# Patient Record
Sex: MALE | Race: BLACK OR AFRICAN AMERICAN | Employment: UNEMPLOYED | URBAN - METROPOLITAN AREA
[De-identification: names, ages, dates, MRNs, and addresses within clinical notes are randomized per-mention and may not be internally consistent; named-entity substitution may affect disease eponyms.]

---

## 2019-02-16 ENCOUNTER — ED HISTORICAL/CONVERTED ENCOUNTER (OUTPATIENT)
Dept: OTHER | Age: 34
End: 2019-02-16

## 2020-08-19 ENCOUNTER — IP HISTORICAL/CONVERTED ENCOUNTER (OUTPATIENT)
Dept: OTHER | Age: 35
End: 2020-08-19

## 2020-10-28 ENCOUNTER — HOSPITAL ENCOUNTER (EMERGENCY)
Age: 35
Discharge: PSYCHIATRIC OR SUBSTANCE USE FACILITY | End: 2020-10-28
Attending: EMERGENCY MEDICINE
Payer: MEDICARE

## 2020-10-28 ENCOUNTER — HOSPITAL ENCOUNTER (INPATIENT)
Age: 35
LOS: 9 days | Discharge: HOME | DRG: 885 | End: 2020-11-06
Attending: PSYCHIATRY & NEUROLOGY | Admitting: PSYCHIATRY & NEUROLOGY
Payer: MEDICARE

## 2020-10-28 VITALS
BODY MASS INDEX: 24.52 KG/M2 | HEIGHT: 73 IN | TEMPERATURE: 97.7 F | HEART RATE: 92 BPM | OXYGEN SATURATION: 100 % | RESPIRATION RATE: 16 BRPM | WEIGHT: 185 LBS | SYSTOLIC BLOOD PRESSURE: 127 MMHG | DIASTOLIC BLOOD PRESSURE: 95 MMHG

## 2020-10-28 DIAGNOSIS — F20.9 SCHIZOPHRENIA, UNSPECIFIED TYPE (HCC): Primary | ICD-10-CM

## 2020-10-28 LAB
ALBUMIN SERPL-MCNC: 4.4 G/DL (ref 3.5–5)
ALBUMIN/GLOB SERPL: 1.3 {RATIO} (ref 1.1–2.2)
ALP SERPL-CCNC: 85 U/L (ref 45–117)
ALT SERPL-CCNC: 59 U/L (ref 12–78)
AMPHET UR QL SCN: NEGATIVE
ANION GAP SERPL CALC-SCNC: 5 MMOL/L (ref 5–15)
APPEARANCE UR: ABNORMAL
AST SERPL-CCNC: 49 U/L (ref 15–37)
BACTERIA URNS QL MICRO: NEGATIVE /HPF
BARBITURATES UR QL SCN: NEGATIVE
BASOPHILS # BLD: 0.1 K/UL (ref 0–0.1)
BASOPHILS NFR BLD: 1 % (ref 0–1)
BENZODIAZ UR QL: NEGATIVE
BILIRUB SERPL-MCNC: 0.6 MG/DL (ref 0.2–1)
BILIRUB UR QL: NEGATIVE
BUN SERPL-MCNC: 12 MG/DL (ref 6–20)
BUN/CREAT SERPL: 9 (ref 12–20)
CALCIUM SERPL-MCNC: 9.3 MG/DL (ref 8.5–10.1)
CANNABINOIDS UR QL SCN: NEGATIVE
CHLORIDE SERPL-SCNC: 101 MMOL/L (ref 97–108)
CO2 SERPL-SCNC: 28 MMOL/L (ref 21–32)
COCAINE UR QL SCN: NEGATIVE
COLOR UR: ABNORMAL
COVID-19 RAPID TEST, COVR: NOT DETECTED
CREAT SERPL-MCNC: 1.3 MG/DL (ref 0.7–1.3)
DIFFERENTIAL METHOD BLD: ABNORMAL
DRUG SCRN COMMENT,DRGCM: NORMAL
EOSINOPHIL # BLD: 0 K/UL (ref 0–0.4)
EOSINOPHIL NFR BLD: 0 % (ref 0–7)
EPITH CASTS URNS QL MICRO: ABNORMAL /LPF
ERYTHROCYTE [DISTWIDTH] IN BLOOD BY AUTOMATED COUNT: 12.6 % (ref 11.5–14.5)
ETHANOL SERPL-MCNC: <10 MG/DL
GLOBULIN SER CALC-MCNC: 3.5 G/DL (ref 2–4)
GLUCOSE SERPL-MCNC: 95 MG/DL (ref 65–100)
GLUCOSE UR STRIP.AUTO-MCNC: NEGATIVE MG/DL
HCT VFR BLD AUTO: 47 % (ref 36.6–50.3)
HEALTH STATUS, XMCV2T: NORMAL
HGB BLD-MCNC: 15.9 G/DL (ref 12.1–17)
HGB UR QL STRIP: NEGATIVE
IMM GRANULOCYTES # BLD AUTO: 0.1 K/UL (ref 0–0.04)
IMM GRANULOCYTES NFR BLD AUTO: 1 % (ref 0–0.5)
KETONES UR QL STRIP.AUTO: ABNORMAL MG/DL
LEUKOCYTE ESTERASE UR QL STRIP.AUTO: NEGATIVE
LYMPHOCYTES # BLD: 2.7 K/UL (ref 0.8–3.5)
LYMPHOCYTES NFR BLD: 22 % (ref 12–49)
MCH RBC QN AUTO: 31.1 PG (ref 26–34)
MCHC RBC AUTO-ENTMCNC: 33.8 G/DL (ref 30–36.5)
MCV RBC AUTO: 91.8 FL (ref 80–99)
METHADONE UR QL: NEGATIVE
MONOCYTES # BLD: 0.8 K/UL (ref 0–1)
MONOCYTES NFR BLD: 7 % (ref 5–13)
NEUTS SEG # BLD: 8.4 K/UL (ref 1.8–8)
NEUTS SEG NFR BLD: 69 % (ref 32–75)
NITRITE UR QL STRIP.AUTO: NEGATIVE
NRBC # BLD: 0 K/UL (ref 0–0.01)
NRBC BLD-RTO: 0 PER 100 WBC
OPIATES UR QL: NEGATIVE
PCP UR QL: NEGATIVE
PH UR STRIP: 5 [PH] (ref 5–8)
PLATELET # BLD AUTO: 299 K/UL (ref 150–400)
PMV BLD AUTO: 10 FL (ref 8.9–12.9)
POTASSIUM SERPL-SCNC: 4 MMOL/L (ref 3.5–5.1)
PROT SERPL-MCNC: 7.9 G/DL (ref 6.4–8.2)
PROT UR STRIP-MCNC: NEGATIVE MG/DL
RBC # BLD AUTO: 5.12 M/UL (ref 4.1–5.7)
RBC #/AREA URNS HPF: ABNORMAL /HPF (ref 0–5)
SODIUM SERPL-SCNC: 134 MMOL/L (ref 136–145)
SOURCE, COVRS: NORMAL
SP GR UR REFRACTOMETRY: 1.02 (ref 1–1.03)
SPECIMEN SOURCE, FCOV2M: NORMAL
SPECIMEN TYPE, XMCV1T: NORMAL
UA: UC IF INDICATED,UAUC: ABNORMAL
UROBILINOGEN UR QL STRIP.AUTO: 0.2 EU/DL (ref 0.2–1)
WBC # BLD AUTO: 12.1 K/UL (ref 4.1–11.1)
WBC URNS QL MICRO: ABNORMAL /HPF (ref 0–4)

## 2020-10-28 PROCEDURE — 87635 SARS-COV-2 COVID-19 AMP PRB: CPT

## 2020-10-28 PROCEDURE — 80307 DRUG TEST PRSMV CHEM ANLYZR: CPT

## 2020-10-28 PROCEDURE — 36415 COLL VENOUS BLD VENIPUNCTURE: CPT

## 2020-10-28 PROCEDURE — 80053 COMPREHEN METABOLIC PANEL: CPT

## 2020-10-28 PROCEDURE — 99284 EMERGENCY DEPT VISIT MOD MDM: CPT

## 2020-10-28 PROCEDURE — 65220000003 HC RM SEMIPRIVATE PSYCH

## 2020-10-28 PROCEDURE — 81001 URINALYSIS AUTO W/SCOPE: CPT

## 2020-10-28 PROCEDURE — 85025 COMPLETE CBC W/AUTO DIFF WBC: CPT

## 2020-10-28 NOTE — ED NOTES
verbal} shift change report given to Loulou Christian RN (oncoming nurse) by Val Gonsales RN (offgoing nurse). Report included the following information SBAR, Kardex, ED Summary, Procedure Summary, Intake/Output and MAR.

## 2020-10-28 NOTE — ED NOTES
Bedside and Verbal shift change report given to Turner Romero   (oncoming nurse) by Barry Bledsoe RN (offgoing nurse). Report included the following information SBAR, Kardex, ED Summary, [facility] and Recent Results.

## 2020-10-28 NOTE — ED NOTES
Pt belongings in black cabinet in belongings bag and labeled, pt only has one bag of belongings. Pt has cell phone, wallet with 1 dollar in it per [employer] officer and shoes, shirt and pants.

## 2020-10-28 NOTE — ED NOTES
Spoke with Anjali Suarez from [address] [place] [address] and per reports it seems that the family member that took out the ECO on behalf of his grandmother is not available. Anjali Suarez stated she was going to try and get in contact with the grandmother to confirm why ECO was taken out and would get back with this writer to see if patient meets criteria for TDO.

## 2020-10-28 NOTE — ED NOTES
Pt stated he would allow for labs to be drawn. This writer inserted IV and this writer was able to obtain a pst when patient stated he felt light headed. Pt asked to remove IV. This writer removed IV. Pt was laid down in bed. Pt then sat back up in bed and was provided water which he tolerated. This writer advised we would try again for labs in a few minutes.
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
## 2020-10-29 PROBLEM — F20.9 SCHIZOPHRENIA (HCC): Status: ACTIVE | Noted: 2020-10-29

## 2020-10-29 LAB
SARS-COV-2, COV2: NOT DETECTED
SPECIMEN SOURCE, FCOV2M: NORMAL

## 2020-10-29 PROCEDURE — 65220000003 HC RM SEMIPRIVATE PSYCH

## 2020-10-29 PROCEDURE — 74011250637 HC RX REV CODE- 250/637: Performed by: PSYCHIATRY & NEUROLOGY

## 2020-10-29 RX ORDER — HALOPERIDOL 5 MG/ML
5 INJECTION INTRAMUSCULAR
Status: DISCONTINUED | OUTPATIENT
Start: 2020-10-29 | End: 2020-11-06 | Stop reason: HOSPADM

## 2020-10-29 RX ORDER — ADHESIVE BANDAGE
30 BANDAGE TOPICAL DAILY PRN
Status: DISCONTINUED | OUTPATIENT
Start: 2020-10-29 | End: 2020-11-06 | Stop reason: HOSPADM

## 2020-10-29 RX ORDER — BENZTROPINE MESYLATE 1 MG/1
1 TABLET ORAL
Status: DISCONTINUED | OUTPATIENT
Start: 2020-10-29 | End: 2020-11-06 | Stop reason: HOSPADM

## 2020-10-29 RX ORDER — OLANZAPINE 5 MG/1
5 TABLET ORAL
Status: DISCONTINUED | OUTPATIENT
Start: 2020-10-29 | End: 2020-11-06 | Stop reason: HOSPADM

## 2020-10-29 RX ORDER — ACETAMINOPHEN 325 MG/1
650 TABLET ORAL
Status: DISCONTINUED | OUTPATIENT
Start: 2020-10-29 | End: 2020-11-06 | Stop reason: HOSPADM

## 2020-10-29 RX ORDER — HYDROXYZINE 50 MG/1
50 TABLET, FILM COATED ORAL
Status: DISCONTINUED | OUTPATIENT
Start: 2020-10-29 | End: 2020-11-06 | Stop reason: HOSPADM

## 2020-10-29 RX ORDER — TRAZODONE HYDROCHLORIDE 50 MG/1
50 TABLET ORAL
Status: DISCONTINUED | OUTPATIENT
Start: 2020-10-29 | End: 2020-11-06 | Stop reason: HOSPADM

## 2020-10-29 RX ORDER — DIPHENHYDRAMINE HYDROCHLORIDE 50 MG/ML
50 INJECTION, SOLUTION INTRAMUSCULAR; INTRAVENOUS
Status: DISCONTINUED | OUTPATIENT
Start: 2020-10-29 | End: 2020-11-06 | Stop reason: HOSPADM

## 2020-10-29 RX ORDER — LORAZEPAM 2 MG/ML
1 INJECTION INTRAMUSCULAR
Status: DISCONTINUED | OUTPATIENT
Start: 2020-10-29 | End: 2020-11-06 | Stop reason: HOSPADM

## 2020-10-29 NOTE — PROGRESS NOTES
Problem: Altered Thought Process (Adult/Pediatric)  Goal: *STG: Seeks staff when feelings of anxiety and fear arise  Outcome: Progressing Towards Goal  Goal: *STG: Complies with medication therapy  Outcome: Progressing Towards Goal     Problem: Patient Education: Go to Patient Education Activity  Goal: Patient/Family Education  Outcome: Progressing Towards Goal

## 2020-10-29 NOTE — BH NOTES
TRANSFER - IN REPORT:    Verbal report received from Fernando Beckford 20 RN(name) on Aamir Lang  being received from [address] [place] [address] ER(unit) for routine progression of care      Report consisted of patients Situation, Background, Assessment and   Recommendations(SBAR). Information from the following report(s) Kardex was reviewed with the receiving nurse. Opportunity for questions and clarification was provided. Assessment completed upon patients arrival to unit and care assumed.      Calm and cooperative during stay in ER
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
## 2020-10-29 NOTE — PROGRESS NOTES
[facility] 60  Master Treatment Plan for Morgan Solomon    Date Treatment Plan Initiated: 10/29/2020    Treatment Plan Modalities:  Type of Modality Amount  (x minutes) Frequency (x/week) Duration (x days) Name of Responsible Staff   Community & wrap-up meetings to encourage peer interactions 15 7 1 DEEP [place]     Group psychotherapy to assist in building coping skills and internal controls 60 7 1 Arlette Cardoso   Therapeutic activity groups to build coping skills 60 7 1 Arlette Cardoso   Psychoeducation in group setting to address:   Medication education   15 7 [address] PharmD   Coping skills   30 3 1 Arlette Cardoso   Relaxation techniques         Symptom management         Discharge planning   60 2 [facility]    60 2 1 Chaplain DIA   61 1 1 volunteer   Recovery/AA/NA      volunteer   Physician medication management   15 7 1 Dr. Zoila Quezada, NP   Family meeting/discharge planning   15 2 1 Claritza Chaves and Anat Almazan                                      Problem: Altered Thought Process (Adult/Pediatric)  Goal: *STG: Participates in treatment plan  Outcome: Progressing Towards Goal  Goal: *STG: Decreased delusional thinking  Outcome: Progressing Towards Goal  Goal: *STG: Demonstrates ability to understand and use improved judgment in daily activities and relationships  Outcome: Progressing Towards Goal       Pt received up on unit. Engaged on the unit with nursing students leading a group with peers and staff. Calm, cooperative.

## 2020-10-29 NOTE — PROGRESS NOTES
Dex participated in [address] [place] [address] on 10/29/2020.     Rev.  Liliana Bill MDiv, [facility], [address] [facility] service: [facility] ([id number]
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
## 2020-10-29 NOTE — INTERDISCIPLINARY ROUNDS
Behavioral Health Interdisciplinary Rounds Patient Name: Tanner Goode  Age: 38 y.o. Room/Bed:  8/ Primary Diagnosis: <principal problem not specified> Admission Status: TDO Readmission within 30 days: no 
Power of  in place: no 
Patient requires a blocked bed: yes          Reason for blocked bed: behavior VTE Prophylaxis: No 
 
Mobility needs/Fall risk: no 
Flu Vaccine : no  
Nutritional Plan: no 
Consults:         
Labs/Testing due today?: yes Sleep hours:6 Participation in Care/Groups:  New admission Medication Compliant?: new admission PRNS (last 24 hours): New admission Restraints (last 24 hours):  no 
  
CIWA (range last 24 hours): COWS (range last 24 hours): Alcohol screening (AUDIT) completed -   AUDIT Score: 2 If applicable, date SBIRT discussed in treatment team AND documented:  
AUDIT Screen Score: AUDIT Score: 2 Document Brief Intervention (corresponds directly with the 5 A's, Ask, Advise, Assess, Assist, and Arrange): At- Risk Patients (Score 7-15 for women; 8-15 for men) Discuss concern patient is drinking at unhealthy levels known to increase risk of alcohol-related health problems. Is Patient ready to commit to change? If No: 
? Encourage reflection ? Discuss short term and long term health risks of consuming alcohol ? Barriers to change ? Reaffirm willingness to help / Educational materials provided If Yes: 
? Set goal 
? Plan 
? Educational materials provided Harmful use or Dependence (Score 16 or greater) ? Discuss short term and long term health risks of consuming alcohol ? Recommendations ? Negotiate drinking goal 
? Recommend addiction specialist/center ? Arrange follow-up appointments. Tobacco - patient is a smoker: Have You Used Tobacco in the Past 30 Days: No 
Illegal Drugs use: Have You Used Any Illegal Substances Over the Past 12 Months: No 
 
24 hour chart check complete: yes Patient goal(s) for today: Meet with Tx team - prepare for TDO Treatment team focus/goals: Interview - complete psych social assessment / develop course of tx  
Progress note Alert, engaged with tx Team and lacks insight into managing his MI 
 
LOS:  1  Expected LOS:  
 
Financial concerns/prescription coverage:   
Family contact:  Mrs Quiana Quinones ( grandmother) [phone] Family requesting physician contact today:   
Discharge plan: Lives w/ Grandmother Access to weapons :        
Outpatient provider(s): To Be linked to out pt psych provider Patient's preferred phone number for follow up call :  
Patient's preferred e-mail address : 
Participating treatment team members: Flavia Mujica RN

## 2020-10-29 NOTE — ED NOTES
TRANSFER - OUT REPORT:    Verbal report given to Alyse Arellano, RN(name) on Mulu Shetty  being transferred to [address](unit) for routine progression of care       Report consisted of patients Situation, Background, Assessment and   Recommendations(SBAR). Information from the following report(s) SBAR, ED Summary and Recent Results was reviewed with the receiving nurse. Lines:       Opportunity for questions and clarification was provided.       Patient transported with:   BUFFY
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
## 2020-10-29 NOTE — BH NOTES
Admission reviewed for medical necessity. Will follow with care [facility].

## 2020-10-29 NOTE — PROGRESS NOTES
Problem: Falls - Risk of  Goal: *Absence of Falls  Description: Document Bladimir Howard Fall Risk and appropriate interventions in the flowsheet. Outcome: Progressing Towards Goal  Note: Fall Risk Interventions:             Pt. Appears to be sleeping. NAD, respirations even and unlabored. Will continue q15 safety rounds.

## 2020-10-30 PROCEDURE — 65220000003 HC RM SEMIPRIVATE PSYCH

## 2020-10-30 PROCEDURE — 74011250637 HC RX REV CODE- 250/637: Performed by: PSYCHIATRY & NEUROLOGY

## 2020-10-30 PROCEDURE — 74011250637 HC RX REV CODE- 250/637: Performed by: NURSE PRACTITIONER

## 2020-10-30 RX ORDER — HALOPERIDOL 5 MG/ML
5 INJECTION INTRAMUSCULAR DAILY
Status: DISCONTINUED | OUTPATIENT
Start: 2020-10-31 | End: 2020-11-02

## 2020-10-30 RX ORDER — THERA TABS 400 MCG
1 TAB ORAL DAILY
COMMUNITY
End: 2020-11-06 | Stop reason: CLARIF

## 2020-10-30 RX ORDER — CHOLECALCIFEROL (VITAMIN D3) 125 MCG
100 CAPSULE ORAL DAILY
COMMUNITY
End: 2020-11-06 | Stop reason: CLARIF

## 2020-10-30 RX ORDER — ARIPIPRAZOLE 5 MG/1
5 TABLET ORAL DAILY
Status: ON HOLD | COMMUNITY
End: 2020-10-30

## 2020-10-30 NOTE — PROGRESS NOTES
Problem: Altered Thought Process (Adult/Pediatric)  Goal: *STG: Seeks staff when feelings of anxiety and fear arise  Outcome: Progressing Towards Goal     Problem: Altered Thought Process (Adult/Pediatric)  Goal: *STG: Complies with medication therapy  Outcome: Not Progressing Towards Goal     Problem: Altered Thought Process (Adult/Pediatric)  Goal: *STG: Attends activities and groups  Outcome: Progressing Towards Goal     Problem: Altered Thought Process (Adult/Pediatric)  Goal: Interventions  Outcome: Progressing Towards Goal  Note: Pt is calm. Verbalizes concerns and needs to staff. Refuses medications. States he functions better with no medication. Poor insight blaming others. Appears angry with grandmother during treatment team meeting.  [facility] hearing today

## 2020-10-30 NOTE — PROGRESS NOTES
Laboratory Monitoring for Antipsychotics: This patient is currently prescribed the following medication(s):   Current Facility-Administered Medications   Medication Dose Route Frequency    [START ON 10/31/2020] ARIPiprazole (ABILIFY) tablet 15 mg  15 mg Oral DAILY    Or    [START ON 10/31/2020] haloperidol lactate (HALDOL) injection 5 mg  5 mg IntraMUSCular DAILY     The following labs have been completed for monitoring of antipsychotics and/or mood stabilizers:    Height, Weight, BMI Estimation  Estimated body mass index is 24.41 kg/m² as calculated from the following:    Height as of an earlier encounter on 10/28/20: 185.4 cm (73\"). Weight as of an earlier encounter on 10/28/20: 83.9 kg (185 lb). Vital Signs/Blood Pressure  Visit Vitals  BP (!) 131/93   Pulse 99   Temp 99.7 °F (37.6 °C)   Resp 16   SpO2 96%     Renal Function, Hepatic Function and Chemistry  CrCl cannot be calculated (Unknown ideal weight.). Lab Results   Component Value Date/Time    Sodium 134 (L) 10/28/2020 05:35 PM    Potassium 4.0 10/28/2020 05:35 PM    Chloride 101 10/28/2020 05:35 PM    CO2 28 10/28/2020 05:35 PM    Anion gap 5 10/28/2020 05:35 PM    BUN 12 10/28/2020 05:35 PM    Creatinine 1.30 10/28/2020 05:35 PM    BUN/Creatinine ratio 9 (L) 10/28/2020 05:35 PM    Bilirubin, total 0.6 10/28/2020 05:35 PM    Protein, total 7.9 10/28/2020 05:35 PM    Albumin 4.4 10/28/2020 05:35 PM    Globulin 3.5 10/28/2020 05:35 PM    A-G Ratio 1.3 10/28/2020 05:35 PM    ALT (SGPT) 59 10/28/2020 05:35 PM    AST (SGOT) 49 (H) 10/28/2020 05:35 PM    Alk.  phosphatase 85 10/28/2020 05:35 PM     Lab Results   Component Value Date/Time    Glucose 95 10/28/2020 05:35 PM     No results found for: HBA1C, HGBE8, [id number]    Hematology  Lab Results   Component Value Date/Time    WBC 12.1 (H) 10/28/2020 05:35 PM    RBC 5.12 10/28/2020 05:35 PM    HGB 15.9 10/28/2020 05:35 PM    HCT 47.0 10/28/2020 05:35 PM    MCV 91.8 10/28/2020 05:35 PM    MCH 31.1 10/28/2020 05:35 PM    MCHC 33.8 10/28/2020 05:35 PM    RDW 12.6 10/28/2020 05:35 PM    PLATELET 126 68/61/4618 05:35 PM     Lipids  No results found for: CHOL, CHOLX, CHLST, CHOLV, 118997, HDL, HDLP, LDL, LDLC, DLDLP, TGLX, TRIGL, TRIGP, CHHD, CHHDX    Thyroid Function  No results found for: TSH, TSH2, TSH3, TSHP, TSHELE, TT3, T3U, T3UP, FRT3, FT3, FT4, FT4P, T4, T4P, FT4T, TT7, TSHEXT    Assessment/Plan:  Will order lipid panel and hemoglobin A1c or fasting glucose to complete the recommended baseline laboratory monitoring based on the patient's current medication regimen.         Jermaine Hernandez, PHARMD

## 2020-10-30 NOTE — BH NOTES
Patient refused labs this morning. Stated he has already given blood down stairs, and that we should just use that. Staff tried to explain that we needed different labs, pt still refused.

## 2020-10-30 NOTE — INTERDISCIPLINARY ROUNDS
Behavioral Health Interdisciplinary Rounds Patient Name: Nereyda Rich  Age: 38 y.o. Room/Bed:  730/01 Primary Diagnosis: <principal problem not specified> Admission Status: IC - Forced Meds Readmission within 30 days: no 
Power of  in place: no 
Patient requires a blocked bed: no          Reason for blocked bed: VTE Prophylaxis: No 
 
Mobility needs/Fall risk: no 
Flu Vaccine : no  
Nutritional Plan: no 
Consults:         
Labs/Testing due today?: yes Sleep hours: 7 Participation in Care/Groups:  yes Medication Compliant?: Refusing Psychiatric Medications PRNS (last 24 hours): None Restraints (last 24 hours):  no 
  
CIWA (range last 24 hours): COWS (range last 24 hours): Alcohol screening (AUDIT) completed -   AUDIT Score: 2 If applicable, date SBIRT discussed in treatment team AND documented:  
AUDIT Screen Score: AUDIT Score: 2 Document Brief Intervention (corresponds directly with the 5 A's, Ask, Advise, Assess, Assist, and Arrange): At- Risk Patients (Score 7-15 for women; 8-15 for men) Discuss concern patient is drinking at unhealthy levels known to increase risk of alcohol-related health problems. Is Patient ready to commit to change? If No: 
? Encourage reflection ? Discuss short term and long term health risks of consuming alcohol ? Barriers to change ? Reaffirm willingness to help / Educational materials provided If Yes: 
? Set goal 
? Plan 
? Educational materials provided Harmful use or Dependence (Score 16 or greater) ? Discuss short term and long term health risks of consuming alcohol ? Recommendations ? Negotiate drinking goal 
? Recommend addiction specialist/center ? Arrange follow-up appointments. Tobacco - patient is a smoker: Have You Used Tobacco in the Past 30 Days: No 
Illegal Drugs use: Have You Used Any Illegal Substances Over the Past 12 Months: No 
 
24 hour chart check complete: yes Patient goal(s) for today: Prepare for TDO hearing Treatment team focus/goals: Eval for meds Progress note : Pt reported not needing medications and unsure if he can return to live with his grandmother. Pt throughout the meeting was unable to concentrate on his thoughts and rambling form various subjects. He denied having any SI/HI thoughts but making an argument why he wont take medications. LOS:  2  Expected LOS:  
 
Financial concerns/prescription coverage:  TDO Family contact:  No Signed FERNANDEZ Family requesting physician contact today:   
Discharge plan: TBD if he can return to live his grandmother or seek alternative living arrangement Access to weapons :  n/a Outpatient provider(s): TBD Patient's preferred phone number for follow up call :  
Patient's preferred e-mail address : 
Participating treatment team members: Dr Partha Palmer RN

## 2020-10-30 NOTE — PROGRESS NOTES
Problem: Falls - Risk of  Goal: *Absence of Falls  Description: Document Dawson Rowan Fall Risk and appropriate interventions in the flowsheet. Outcome: Progressing Towards Goal  Note: Fall Risk Interventions:    Medication Interventions: Teach patient to arise slowly         Problem: Altered Thought Process (Adult/Pediatric)  Goal: *STG: Participates in treatment plan  Outcome: Not Progressing Towards Goal     Problem: Altered Thought Process (Adult/Pediatric)  Goal: *STG: Complies with medication therapy  10/30/2020 1806 by Chito Segura  Outcome: Not Progressing Towards Goal     Problem: Altered Thought Process (Adult/Pediatric)  Goal: *STG: Decreased delusional thinking  Outcome: Not Progressing Towards Goal     Problem: Altered Thought Process (Adult/Pediatric)  Goal: Interventions  10/30/2020 1806 by Chito Segura  Outcome: Progressing Towards Goal  Note: Pt is calm. Plays games on the unit. No aggressive behavior observed. Pt continues to refuse medications. Education provided. No evidence learning. Pt minimizes PTA events. 10/30/2020 1312 by Chito Segura  Outcome: Progressing Towards Goal  Note: Pt is calm. Verbalizes concerns and needs to staff. Refuses medications. States he functions better with no medication. Poor insight blaming others. Appears angry with grandmother during treatment team meeting.  [facility] hearing today

## 2020-10-30 NOTE — PROGRESS NOTES
Problem: Falls - Risk of  Goal: *Absence of Falls  Description: Document María Elena Zurita Fall Risk and appropriate interventions in the flowsheet. Outcome: Progressing Towards Goal  Note: Fall Risk Interventions:            Medication Interventions: Teach patient to arise slowly       Pt. Received resting in bed, NAD, respirations even and unlabored. Will continue q15 safety rounds.

## 2020-10-30 NOTE — PROGRESS NOTES
Admission Medication Reconciliation:    Information obtained from:  Patient, RX Query   RxQuery data available¹: Yes     Comments/Recommendations: Updated PTA meds/reviewed patient's allergies. 1)  Pt was released from [facility] in 09/2020 with Abilify 5 mg. Patient has no interest in taking an antipsychotic and has refused doses as well as labs since being on the unit. 2)  Medication changes (since last review): Added  - Co- enzyme Q10 daily   - multivitamin daily    Removed  - Abilify 5 mg      ¹RxQuery pharmacy benefit data reflects medications filled and processed through the patient's insurance, however   this data does NOT capture whether the medication was picked up or is currently being taken by the patient. Allergies:  Patient has no known allergies. Significant PMH/Disease States: No past medical history on file. Chief Complaint for this Admission:  No chief complaint on file. Prior to Admission Medications:   Prior to Admission Medications   Prescriptions Last Dose Informant Taking? co-enzyme Q-10 (CO Q-10) 100 mg capsule   Yes   Sig: Take 100 mg by mouth daily. therapeutic multivitamin (THERAGRAN) tablet   Yes   Sig: Take 1 Tab by mouth daily. Facility-Administered Medications: None       Please contact the main inpatient pharmacy with any questions or concerns at [phone] and we will direct you to the clinical pharmacist covering this patient's care while in-house.    Autumn Moore, PharmD Candidate [id number]
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
## 2020-10-31 LAB
CHOLEST SERPL-MCNC: 147 MG/DL
EST. AVERAGE GLUCOSE BLD GHB EST-MCNC: 117 MG/DL
HBA1C MFR BLD: 5.7 % (ref 4–5.6)
HDLC SERPL-MCNC: 63 MG/DL
HDLC SERPL: 2.3 {RATIO} (ref 0–5)
LDLC SERPL CALC-MCNC: 69.2 MG/DL (ref 0–100)
LIPID PROFILE,FLP: NORMAL
TRIGL SERPL-MCNC: 74 MG/DL (ref ?–150)
TSH SERPL DL<=0.05 MIU/L-ACNC: 1.93 UIU/ML (ref 0.36–3.74)
VLDLC SERPL CALC-MCNC: 14.8 MG/DL

## 2020-10-31 PROCEDURE — 74011250637 HC RX REV CODE- 250/637: Performed by: PSYCHIATRY & NEUROLOGY

## 2020-10-31 PROCEDURE — 65220000003 HC RM SEMIPRIVATE PSYCH

## 2020-10-31 PROCEDURE — 80061 LIPID PANEL: CPT

## 2020-10-31 PROCEDURE — 83036 HEMOGLOBIN GLYCOSYLATED A1C: CPT

## 2020-10-31 PROCEDURE — 84443 ASSAY THYROID STIM HORMONE: CPT

## 2020-10-31 PROCEDURE — 36415 COLL VENOUS BLD VENIPUNCTURE: CPT

## 2020-10-31 RX ADMIN — ARIPIPRAZOLE 15 MG: 10 TABLET ORAL at 09:47

## 2020-10-31 NOTE — PROGRESS NOTES
Problem: Falls - Risk of  Goal: *Absence of Falls  Description: Document Rochele Dottie Fall Risk and appropriate interventions in the flowsheet.   Outcome: Progressing Towards Goal  Note: Fall Risk Interventions:            Medication Interventions: Teach patient to arise slowly       Pt appears asleep in bed, NAD, even respirations, will continue to monitor q15min

## 2020-10-31 NOTE — BH NOTES
GROUP THERAPY PROGRESS NOTE    Patient did not participate in Discharge/Goals Group.      Arlette Cardoso, Supervisee in Social Work

## 2020-10-31 NOTE — PROGRESS NOTES
Problem: Falls - Risk of  Goal: *Absence of Falls  Description: Document María Elena Parminder Fall Risk and appropriate interventions in the flowsheet.   Outcome: Progressing Towards Goal  Note: Fall Risk Interventions:     Medication Interventions: Teach patient to arise slowly

## 2020-10-31 NOTE — PROGRESS NOTES
Education provided related to scheduled Medication. Pt agrees to take oral po medication. Pt appears angry. Disorganized. Paranoid. [id number]- pt is visible on the unit talking to peers.

## 2020-10-31 NOTE — INTERDISCIPLINARY ROUNDS
Behavioral Health Interdisciplinary Rounds Patient Name: Vivian Bragg  Age: 38 y.o. Room/Bed:  730/01 Primary Diagnosis: <principal problem not specified> Admission Status: Involuntary Commitment and Forced Medication Order Readmission within 30 days: no 
Power of  in place: no 
Patient requires a blocked bed: no          Reason for blocked bed: VTE Prophylaxis: No 
 
Mobility needs/Fall risk: no 
Flu Vaccine : no  
Nutritional Plan: no 
Consults:         
Labs/Testing due today?: yes/complete Sleep hours:  7 Participation in Care/Groups:  yes Medication Compliant?: Refusing Psychiatric Medications PRNS (last 24 hours): None Restraints (last 24 hours):  no 
  
CIWA (range last 24 hours): COWS (range last 24 hours): Alcohol screening (AUDIT) completed -   AUDIT Score: 2 If applicable, date SBIRT discussed in treatment team AND documented:  
AUDIT Screen Score: AUDIT Score: 2 Document Brief Intervention (corresponds directly with the 5 A's, Ask, Advise, Assess, Assist, and Arrange): At- Risk Patients (Score 7-15 for women; 8-15 for men) Discuss concern patient is drinking at unhealthy levels known to increase risk of alcohol-related health problems. Is Patient ready to commit to change? If No: 
? Encourage reflection ? Discuss short term and long term health risks of consuming alcohol ? Barriers to change ? Reaffirm willingness to help / Educational materials provided If Yes: 
? Set goal 
? Plan 
? Educational materials provided Harmful use or Dependence (Score 16 or greater) ? Discuss short term and long term health risks of consuming alcohol ? Recommendations ? Negotiate drinking goal 
? Recommend addiction specialist/center ? Arrange follow-up appointments.  
 
Tobacco - patient is a smoker: Have You Used Tobacco in the Past 30 Days: No 
Illegal Drugs use: Have You Used Any Illegal Substances Over the Past 12 Months: No 
 
 24 hour chart check complete: yes Patient goal(s) for today:  
Treatment team focus/goals: 
Progress note LOS:  2  Expected LOS:  
 
Financial concerns/prescription coverage:   
Family contact:      
Family requesting physician contact today:   
Discharge plan: Access to weapons :        
Outpatient provider(s):  
Patient's preferred phone number for follow up call :  
Patient's preferred e-mail address : 
Participating treatment team members: Danielle Pandya, * (assigned SW),

## 2020-11-01 PROCEDURE — 65220000003 HC RM SEMIPRIVATE PSYCH

## 2020-11-01 PROCEDURE — 74011250637 HC RX REV CODE- 250/637: Performed by: PSYCHIATRY & NEUROLOGY

## 2020-11-01 RX ADMIN — ARIPIPRAZOLE 15 MG: 10 TABLET ORAL at 09:09

## 2020-11-01 NOTE — GROUP NOTE
JAYA  GROUP DOCUMENTATION INDIVIDUAL Group Therapy Note Date: 10/31/2020 Group Start Time: 46 Group End Time: 4081 Group Topic: Reflection/Relaxation Sharon Pat  GROUP DOCUMENTATION GROUP Group Therapy Note Attendees: 9/11 Attendance: Attended Interventions/techniques: Informed Follows Directions: Followed directions Interactions: Interacted appropriately Mental Status: Calm Behavior/appearance: Cooperative Goals Achieved: Able to engage in interactions and Able to listen to others Jasbir Valdivia

## 2020-11-01 NOTE — PROGRESS NOTES
Problem: Altered Thought Process (Adult/Pediatric)  Goal: *STG: Participates in treatment plan  Outcome: Progressing Towards Goal  Patient verbal with staff during treatment team, denying SI, HI and AVH at this time. Goal: *STG: Complies with medication therapy  Outcome: Progressing Towards Goal       Patient compliant with scheduled medications.     Problem: Patient Education: Go to Patient Education Activity  Goal: Patient/Family Education  Outcome: Progressing Towards Goal     1015  Patient attending group with Evelia Alexander

## 2020-11-01 NOTE — PROGRESS NOTES
Problem: Altered Thought Process (Adult/Pediatric)  Goal: *STG: Seeks staff when feelings of anxiety and fear arise  Outcome: Progressing Towards Goal  Goal: *STG: Complies with medication therapy  Outcome: Progressing Towards Goal  Goal: *STG: Demonstrates ability to understand and use improved judgment in daily activities and relationships  Outcome: Progressing Towards Goal  Goal: Interventions  Outcome: Progressing Towards Goal

## 2020-11-01 NOTE — BH NOTES
Chief Complaint:  Let me sleep. Length of Stay: 3 Days    Interval History:  Mr. Vanna dEmond is irritable during the interview. He states he does not have any questions and would like to excuse himself from the team since he took his first dose of abilify today and would like to go to his room and sleep. He is on court ordered medication. Nursing staff report he's been isolative to his room. Minimal interaction is possible with him. Past Medical History:  No past medical history on file. Labs:  Lab Results   Component Value Date/Time    WBC 12.1 (H) 10/28/2020 05:35 PM    HGB 15.9 10/28/2020 05:35 PM    HCT 47.0 10/28/2020 05:35 PM    PLATELET 111 86/88/2354 05:35 PM    MCV 91.8 10/28/2020 05:35 PM      Lab Results   Component Value Date/Time    Sodium 134 (L) 10/28/2020 05:35 PM    Potassium 4.0 10/28/2020 05:35 PM    Chloride 101 10/28/2020 05:35 PM    CO2 28 10/28/2020 05:35 PM    Anion gap 5 10/28/2020 05:35 PM    Glucose 95 10/28/2020 05:35 PM    BUN 12 10/28/2020 05:35 PM    Creatinine 1.30 10/28/2020 05:35 PM    BUN/Creatinine ratio 9 (L) 10/28/2020 05:35 PM    GFR est AA >60 10/28/2020 05:35 PM    GFR est non-AA >60 10/28/2020 05:35 PM    Calcium 9.3 10/28/2020 05:35 PM    Bilirubin, total 0.6 10/28/2020 05:35 PM    Alk.  phosphatase 85 10/28/2020 05:35 PM    Protein, total 7.9 10/28/2020 05:35 PM    Albumin 4.4 10/28/2020 05:35 PM    Globulin 3.5 10/28/2020 05:35 PM    A-G Ratio 1.3 10/28/2020 05:35 PM    ALT (SGPT) 59 10/28/2020 05:35 PM      Vitals:    10/30/20 1600 10/31/20 0742 10/31/20 1115 10/31/20 1603   BP: 119/79 (!) 142/97 128/83 124/80   Pulse: 68 97 82 84   Resp: 16 16 16 16   Temp: 99.2 °F (37.3 °C) 98.2 °F (36.8 °C) 99.1 °F (37.3 °C) 98.9 °F (37.2 °C)   SpO2: 100% 100% 100% 100%         Current Facility-Administered Medications   Medication Dose Route Frequency Provider Last Rate Last Dose    ARIPiprazole (ABILIFY) tablet 15 mg  15 mg Oral DAILY Sari Trejo MD   15 mg at 10/31/20 0947    Or    haloperidol lactate (HALDOL) injection 5 mg  5 mg IntraMUSCular DAILY Mik Rockwell MD        OLANZapine (ZyPREXA) tablet 5 mg  5 mg Oral Q6H PRN PadillaManfredi, Sela Ganser, NP        haloperidol lactate (HALDOL) injection 5 mg  5 mg IntraMUSCular Q6H PRN Padilla-Eileen, Sela Ganser, FERMÍN        benztropine (COGENTIN) tablet 1 mg  1 mg Oral BID PRN Padilla-Eileen, Sela Ganser, FERMÍN        diphenhydrAMINE (BENADRYL) injection 50 mg  50 mg IntraMUSCular BID PRN PadillaBraulioi, Sela Ganser, FERMÍN        hydrOXYzine HCL (ATARAX) tablet 50 mg  50 mg Oral TID PRN Padilla-Manfredi, Sela Ganser, NP        LORazepam (ATIVAN) injection 1 mg  1 mg IntraMUSCular Q4H PRN Padilla-Eileen, Sela Ganser, NP        traZODone (DESYREL) tablet 50 mg  50 mg Oral QHS PRN ValerieManfredi, Sela Ganser, NP        acetaminophen (TYLENOL) tablet 650 mg  650 mg Oral Q4H PRN Jairoi, Sela Ganser, NP        magnesium hydroxide (MILK OF MAGNESIA) 400 mg/5 mL oral suspension 30 mL  30 mL Oral DAILY PRN Patterson-Manfredi, Sela Ganser, FERMÍN             Mental Status Exam:  Eye contact: limited  Grooming: fair  Psychomotor activity: WNL  Speech is spontaneous  Mood is \"fine\"  Affect: irritable  Perception: Denies any AH or VH. Suicidal ideation: Denies any SI or plan. Cognition is grossly intact       Physical Exam:  Body habitus: There is no height or weight on file to calculate BMI. Musculoskeletal system: normal gait  Tremor - neg  Cog wheeling - neg      Assessment and Plan:  Ru Ravi meets criteria for a diagnosis of Paranoid schizophrenia    Continue the medication regimen as prescribed  Disposition planning to continue.    I certify that this patients inpatient psychiatric hospital services furnished since the previous certification were, and continue to be, required for treatment that could reasonably be expected to improve the patient's condition, or for diagnostic study, and that the patient continues to need, on a daily basis, active treatment furnished directly by or requiring the supervision of inpatient psychiatric facility personnel. In addition, the hospital records show that services furnished were intensive treatment services, admission or related services, or equivalent services.

## 2020-11-01 NOTE — BH NOTES
GROUP THERAPY PROGRESS NOTE    Patient is participating in [place]. Group time: 50 minutes    Personal goal for participation: Creating a safety plan for patients in crisis or having suicidal ideation      Goal orientation: Personal    Group therapy participation: minimal    Therapeutic interventions reviewed and discussed: Group discussion was focused answering the safety plan that [facility] is creating for patients when they discharge. Each question was explained to patients, and they had to fill out the answers individually. At the end, group members were encouraged to share parts of their safety plan or identify one takeaway from completing this. Group discussion included different ways patient could utilize this plan when they leave. Impression of participation: Patient attempted to complete the safety plan. He was present in group and was unable to answer the questions to the majority of the safety plan. Will reassess and try this at another time.      Arlette Cardoso, Supervisee in Social Work

## 2020-11-01 NOTE — BH NOTES
Chief Complaint:  I am doing good. Length of Stay: 4 Days    Interval History:  Mr. Rose Ambriz states he is doing good. He denies si hi or avh. Nursing staff report he asked for his medications this morning, he's been compliant with his meds and denies side effects. Calmer today but bizarre, he asked in groups, \"do you have God's number? \" No behavioral issues. Past Medical History:  No past medical history on file. Labs:  Lab Results   Component Value Date/Time    WBC 12.1 (H) 10/28/2020 05:35 PM    HGB 15.9 10/28/2020 05:35 PM    HCT 47.0 10/28/2020 05:35 PM    PLATELET 134 17/42/0506 05:35 PM    MCV 91.8 10/28/2020 05:35 PM      Lab Results   Component Value Date/Time    Sodium 134 (L) 10/28/2020 05:35 PM    Potassium 4.0 10/28/2020 05:35 PM    Chloride 101 10/28/2020 05:35 PM    CO2 28 10/28/2020 05:35 PM    Anion gap 5 10/28/2020 05:35 PM    Glucose 95 10/28/2020 05:35 PM    BUN 12 10/28/2020 05:35 PM    Creatinine 1.30 10/28/2020 05:35 PM    BUN/Creatinine ratio 9 (L) 10/28/2020 05:35 PM    GFR est AA >60 10/28/2020 05:35 PM    GFR est non-AA >60 10/28/2020 05:35 PM    Calcium 9.3 10/28/2020 05:35 PM    Bilirubin, total 0.6 10/28/2020 05:35 PM    Alk.  phosphatase 85 10/28/2020 05:35 PM    Protein, total 7.9 10/28/2020 05:35 PM    Albumin 4.4 10/28/2020 05:35 PM    Globulin 3.5 10/28/2020 05:35 PM    A-G Ratio 1.3 10/28/2020 05:35 PM    ALT (SGPT) 59 10/28/2020 05:35 PM      Vitals:    10/31/20 1115 10/31/20 1603 11/01/20 0705 11/01/20 0917   BP: 128/83 124/80 130/83    Pulse: 82 84 76    Resp: 16 16 16    Temp: 99.1 °F (37.3 °C) 98.9 °F (37.2 °C) 99 °F (37.2 °C)    SpO2: 100% 100% 97%    Weight:    81.1 kg (178 lb 12.8 oz)         Current Facility-Administered Medications   Medication Dose Route Frequency Provider Last Rate Last Dose    ARIPiprazole (ABILIFY) tablet 15 mg  15 mg Oral DAILY Antoine Jimenez MD   15 mg at 11/01/20 6105    Or    haloperidol lactate (HALDOL) injection 5 mg  5 mg IntraMUSCular DAILY Randall Butler MD        OLANZapine (ZyPREXA) tablet 5 mg  5 mg Oral Q6H PRN Vida Rivera NP        haloperidol lactate (HALDOL) injection 5 mg  5 mg IntraMUSCular Q6H PRN Vida Rivera NP        benztropine (COGENTIN) tablet 1 mg  1 mg Oral BID PRN Vida Rivera NP        diphenhydrAMINE (BENADRYL) injection 50 mg  50 mg IntraMUSCular BID PRN Vida Rivera NP        hydrOXYzine HCL (ATARAX) tablet 50 mg  50 mg Oral TID PRN Vida Rivera NP        LORazepam (ATIVAN) injection 1 mg  1 mg IntraMUSCular Q4H PRN Vida Rivera NP        traZODone (DESYREL) tablet 50 mg  50 mg Oral QHS PRN Vida Rivera NP        acetaminophen (TYLENOL) tablet 650 mg  650 mg Oral Q4H PRN Vida Rivera NP        magnesium hydroxide (MILK OF MAGNESIA) 400 mg/5 mL oral suspension 30 mL  30 mL Oral DAILY PRN Vida Rivera NP             Mental Status Exam:  Eye contact: limited  Grooming: fair  Psychomotor activity: WNL  Speech is spontaneous  Mood is \"fine\"  Affect: irritable  Perception: Denies any AH or VH. Suicidal ideation: Denies any SI or plan. Cognition is grossly intact       Physical Exam:  Body habitus: Body mass index is 23.59 kg/m². Musculoskeletal system: normal gait  Tremor - neg  Cog wheeling - neg      Assessment and Plan:  Claudeen Aquas meets criteria for a diagnosis of Paranoid schizophrenia    Continue the medication regimen as prescribed  Disposition planning to continue.      I certify that this patients inpatient psychiatric hospital services furnished since the previous certification were, and continue to be, required for treatment that could reasonably be expected to improve the patient's condition, or for diagnostic study, and that the patient continues to need, on a daily basis, active treatment furnished directly by or requiring the supervision of inpatient psychiatric facility personnel. In addition, the hospital records show that services furnished were intensive treatment services, admission or related services, or equivalent services.

## 2020-11-01 NOTE — PROGRESS NOTES
Problem: Falls - Risk of  Goal: *Absence of Falls  Description: Document Cynthia Tobias Fall Risk and appropriate interventions in the flowsheet. Outcome: Progressing Towards Goal  Note: Fall Risk Interventions:            Medication Interventions: Teach patient to arise slowly          Pt. Received resting in bed, Nad, respirations even and unlabored. Will continue q15 safety rounds.

## 2020-11-02 PROCEDURE — 74011250637 HC RX REV CODE- 250/637: Performed by: PSYCHIATRY & NEUROLOGY

## 2020-11-02 PROCEDURE — 65220000003 HC RM SEMIPRIVATE PSYCH

## 2020-11-02 RX ORDER — ARIPIPRAZOLE 10 MG/1
20 TABLET ORAL DAILY
Status: DISCONTINUED | OUTPATIENT
Start: 2020-11-03 | End: 2020-11-04

## 2020-11-02 RX ORDER — IBUPROFEN 600 MG/1
600 TABLET ORAL
Status: DISCONTINUED | OUTPATIENT
Start: 2020-11-02 | End: 2020-11-06 | Stop reason: HOSPADM

## 2020-11-02 RX ORDER — HALOPERIDOL 5 MG/ML
5 INJECTION INTRAMUSCULAR DAILY
Status: DISCONTINUED | OUTPATIENT
Start: 2020-11-03 | End: 2020-11-04

## 2020-11-02 RX ADMIN — ARIPIPRAZOLE 15 MG: 10 TABLET ORAL at 08:26

## 2020-11-02 NOTE — PROGRESS NOTES
Problem: Altered Thought Process (Adult/Pediatric)  Goal: *STG: Complies with medication therapy  Outcome: Progressing Towards Goal  Patient compliant with all scheduled. Goal: *STG: Attends activities and groups  Outcome: Progressing Towards Goal     Problem: Patient Education: Go to Patient Education Activity  Goal: Patient/Family Education  Outcome: Progressing Towards Goal     Patient remained calm and cooperative throughout shift, engaging in activities in milieu. NAD noted, will continue to monitor.

## 2020-11-02 NOTE — INTERDISCIPLINARY ROUNDS
Behavioral Health Interdisciplinary Rounds Patient Name: Vince Jose  Age: 38 y.o. Room/Bed:  730/ Primary Diagnosis: <principal problem not specified> Admission Status: Involuntary Commitment and Forced Medication Order Readmission within 30 days: no 
Power of  in place: no 
Patient requires a blocked bed: no          Reason for blocked bed: VTE Prophylaxis:  
 
Mobility needs/Fall risk: no 
Flu Vaccine : no  
Nutritional Plan: no 
Consults:         
Labs/Testing due today?: no 
 
Sleep hours:  7 Participation in Care/Groups:  yes Medication Compliant?: Yes PRNS (last 24 hours): None Restraints (last 24 hours):  no 
  
CIWA (range last 24 hours): COWS (range last 24 hours): Alcohol screening (AUDIT) completed -   AUDIT Score: 2 If applicable, date SBIRT discussed in treatment team AND documented:  
AUDIT Screen Score: AUDIT Score: 2 Tobacco - patient is a smoker: Have You Used Tobacco in the Past 30 Days: No 
Illegal Drugs use: Have You Used Any Illegal Substances Over the Past 12 Months: No 
 
24 hour chart check complete: yes Patient goal(s) for today:  
Treatment team focus/goals: Plan to talk to his grandmother regarding discharge. Plan to increase his Abilify. Progress note : He remains somewhat guarded. SW spoke to his grandmother who reports he called her 3 times today and hung up on her each time. She does not feel he has improved since admission. Stated he was stable the longest when he was on a long acting injection . LOS:  5  Expected LOS: TBD Financial concerns/prescription coverage:  Medicare Family contact:  Franklyn Thornton  - [phone] Family requesting physician contact today:   
Discharge plan: ? Access to weapons :  no Outpatient provider(s): TBD Patient's preferred phone number for follow up call :  
Patient's preferred e-mail address : 
 Participating treatment team members: Malina Valderrama Dr. , RN

## 2020-11-02 NOTE — BH NOTES
GROUP THERAPY PROGRESS NOTE    Patient is participating in coping skills group. Group time: 50 minutes    Personal goal for participation: Learn coping skills to reduce stress and anxiety    Goal orientation: Personal    Group therapy participation: active    Therapeutic interventions reviewed and discussed: Group discussion on ways patients are coping with anxiety and stress and ways they relax. Discussion regarding alternative coping skills using the letters of the alphabet so that each patient would have a list of at least 26 different coping skills. Impression of participation: Sebastian Aguirre was able to participate in group this morning. He did not share many coping skills but did engage in group and complete the document.      Arlette Cardoso, Supervisee in Social Work

## 2020-11-02 NOTE — PROGRESS NOTES
Problem: Altered Thought Process (Adult/Pediatric)  Goal: *STG: Participates in treatment plan  Outcome: Progressing Towards Goal  Goal: *STG: Seeks staff when feelings of anxiety and fear arise  Outcome: Progressing Towards Goal  Goal: *STG: Complies with medication therapy  Outcome: Progressing Towards Goal  Goal: *STG: Attends activities and groups  Outcome: Progressing Towards Goal       Present on the unit. Calm, cooperative. Attended treatment team, agreeable to increase in Abilify dose. Pt encouraged to reach out to grandmother to discuss plans for discharge. Staff to continue q15 minute checks for safety.

## 2020-11-02 NOTE — BH NOTES
Chief Complaint:  I am feeling better now. Length of Stay: 5 Days    Interval History:  Mr. Rose Ambriz is slowly improving. He has been calm and visible in the milieu. Pleasant and calm during the interview. Denies any AH or VH. He has been compliant with taking his medications and says he intends to continue taking it. Denies any adverse events from Abilify. Attending groups and is pleasant and calm during meetings. Says he wants to go back to live with his grandmother temporarily but eventually plans to move in to his own place. Denies any anger towards her and denies any HI towards her. Says he realizes that this is not going to be a long term arrangement. Past Medical History:  No past medical history on file. Labs:  Lab Results   Component Value Date/Time    WBC 12.1 (H) 10/28/2020 05:35 PM    HGB 15.9 10/28/2020 05:35 PM    HCT 47.0 10/28/2020 05:35 PM    PLATELET 437 68/82/5436 05:35 PM    MCV 91.8 10/28/2020 05:35 PM      Lab Results   Component Value Date/Time    Sodium 134 (L) 10/28/2020 05:35 PM    Potassium 4.0 10/28/2020 05:35 PM    Chloride 101 10/28/2020 05:35 PM    CO2 28 10/28/2020 05:35 PM    Anion gap 5 10/28/2020 05:35 PM    Glucose 95 10/28/2020 05:35 PM    BUN 12 10/28/2020 05:35 PM    Creatinine 1.30 10/28/2020 05:35 PM    BUN/Creatinine ratio 9 (L) 10/28/2020 05:35 PM    GFR est AA >60 10/28/2020 05:35 PM    GFR est non-AA >60 10/28/2020 05:35 PM    Calcium 9.3 10/28/2020 05:35 PM    Bilirubin, total 0.6 10/28/2020 05:35 PM    Alk.  phosphatase 85 10/28/2020 05:35 PM    Protein, total 7.9 10/28/2020 05:35 PM    Albumin 4.4 10/28/2020 05:35 PM    Globulin 3.5 10/28/2020 05:35 PM    A-G Ratio 1.3 10/28/2020 05:35 PM    ALT (SGPT) 59 10/28/2020 05:35 PM      Vitals:    11/01/20 0917 11/01/20 1544 11/01/20 1947 11/02/20 0742   BP:  (!) 145/99 123/82 (!) 154/90   Pulse:  87 73 67   Resp:  18 16 16   Temp:  99.3 °F (37.4 °C) 99.1 °F (37.3 °C) 98.7 °F (37.1 °C)   SpO2:  100% 98% 100% Weight: 81.1 kg (178 lb 12.8 oz)            Current Facility-Administered Medications   Medication Dose Route Frequency Provider Last Rate Last Dose    ARIPiprazole (ABILIFY) tablet 15 mg  15 mg Oral DAILY Dimitry Sauer MD   15 mg at 11/02/20 0271    Or    haloperidol lactate (HALDOL) injection 5 mg  5 mg IntraMUSCular DAILY Dimitry Sauer MD        OLANZapine (ZyPREXA) tablet 5 mg  5 mg Oral Q6H PRN Ale Rivera, FERMÍN        haloperidol lactate (HALDOL) injection 5 mg  5 mg IntraMUSCular Q6H PRN Ale Rivera, FERMÍN        benztropine (COGENTIN) tablet 1 mg  1 mg Oral BID PRN Ale Rivera, FERMÍN        diphenhydrAMINE (BENADRYL) injection 50 mg  50 mg IntraMUSCular BID PRN Ale Rivera, FERMÍN        hydrOXYzine HCL (ATARAX) tablet 50 mg  50 mg Oral TID PRN Ale Rivera, NP        LORazepam (ATIVAN) injection 1 mg  1 mg IntraMUSCular Q4H PRN Ale Rivera, NP        traZODone (DESYREL) tablet 50 mg  50 mg Oral QHS PRN Ale Rivera, FERMÍN        acetaminophen (TYLENOL) tablet 650 mg  650 mg Oral Q4H PRN Judy Rivera, NP        magnesium hydroxide (MILK OF MAGNESIA) 400 mg/5 mL oral suspension 30 mL  30 mL Oral DAILY PRN Ale Rivera, FERMÍN             Mental Status Exam:  Eye contact: limited  Grooming: fair  Psychomotor activity: WNL  Speech is spontaneous  Mood is \"good\"  Affect: irritable  Perception: Denies any AH or VH. Suicidal ideation: Denies any SI or plan. Cognition is grossly intact       Physical Exam:  Body habitus: Body mass index is 23.59 kg/m². Musculoskeletal system: normal gait  Tremor - neg  Cog wheeling - neg      Assessment and Plan:  Justino Rosas meets criteria for a diagnosis of Paranoid schizophrenia  Declines to take a long acting antipsychotic deport medications.    Continue the medication regimen as prescribed  Disposition planning to continue. I certify that this patients inpatient psychiatric hospital services furnished since the previous certification were, and continue to be, required for treatment that could reasonably be expected to improve the patient's condition, or for diagnostic study, and that the patient continues to need, on a daily basis, active treatment furnished directly by or requiring the supervision of inpatient psychiatric facility personnel. In addition, the hospital records show that services furnished were intensive treatment services, admission or related services, or equivalent services.

## 2020-11-02 NOTE — BH NOTES
GROUP THERAPY PROGRESS NOTE    Patient is participating in Substance Abuse group. Group time: 50 minutes    Personal goal for participation: To identify and understand stages of change     Goal orientation: Personal    Group therapy participation: active    Therapeutic interventions reviewed and discussed: Group discussion on the stages of change and their treatment needs and strategies of achieving them. The stages were: precontemplation, contemplation, preparation, action, maintenance and relapse. One member was asked to read a poem by Genesis Cueva called Recovery In Five Short Chapters and process this poem as a group and what it means to the members. Finally, each individual was asked to complete a change of plan worksheet and share as much as they want about it with other group members. Impression of participation: [place] actively completed the activity this afternoon. Patient was open to conversation and shared what he wants to change and better. He was able to say positive affirmations to himself.      Arlette Cardoso, Supervisee in Social Work

## 2020-11-03 PROCEDURE — 74011250637 HC RX REV CODE- 250/637: Performed by: PSYCHIATRY & NEUROLOGY

## 2020-11-03 PROCEDURE — 65220000003 HC RM SEMIPRIVATE PSYCH

## 2020-11-03 RX ADMIN — IBUPROFEN 600 MG: 600 TABLET, FILM COATED ORAL at 07:05

## 2020-11-03 RX ADMIN — ARIPIPRAZOLE 20 MG: 10 TABLET ORAL at 08:57

## 2020-11-03 RX ADMIN — IBUPROFEN 600 MG: 600 TABLET, FILM COATED ORAL at 15:47

## 2020-11-03 NOTE — PROGRESS NOTES
Problem: Discharge Planning  Goal: *Discharge to safe environment  Outcome: Not Progressing Towards Goal  Note: He has supportive family , but does not engage with them   He has safe housing, but does not want to return there   Goal: *Knowledge of medication management  Outcome: Progressing Towards Goal  Note: He has been compliant with his medications   Goal: *Knowledge of discharge instructions  Outcome: Not Progressing Towards Goal  Note: He is unable to verbalize discharge instructions

## 2020-11-03 NOTE — PROGRESS NOTES
Problem: Altered Thought Process (Adult/Pediatric)  Goal: *STG: Participates in treatment plan  Outcome: Progressing Towards Goal  Goal: *STG: Complies with medication therapy  Outcome: Progressing Towards Goal  Goal: *STG: Attends activities and groups  Outcome: Progressing Towards Goal  Goal: *STG: Decreased delusional thinking  Outcome: Progressing Towards Goal       Pt received up in dining room. C/o dental pain, given PRN ibuprofen. Calm, cooperative, present in the milieu. Staff to continue q15 minute checks for safety. Pt attends groups on the unit. Interacts with staff and peers appropriately. Attended treatment team- irritable edge, became loud and argumentative about options for medication changes to prepare for discharge, poor insight. Grandmother spoke with SW, she does not feel that he is ready for discharge.

## 2020-11-03 NOTE — PROGRESS NOTES
Pt receiving continuous q15m safety checks, pt currently asleep resting comfortably in bed. No distress noted, respiratory WNL. Supplemental lighting utilized. Problem: Falls - Risk of  Goal: *Absence of Falls  Description: Document Brittanie Rolon Fall Risk and appropriate interventions in the flowsheet.   Outcome: Progressing Towards Goal  Note: Fall Risk Interventions:            Medication Interventions: Teach patient to arise slowly                   Problem: Altered Thought Process (Adult/Pediatric)  Goal: Interventions  Outcome: Progressing Towards Goal

## 2020-11-03 NOTE — PROGRESS NOTES
Problem: Falls - Risk of  Goal: *Absence of Falls  Description: Document Kourtney Sofya Fall Risk and appropriate interventions in the flowsheet.   Outcome: Progressing Towards Goal  Note: Fall Risk Interventions:       Medication Interventions: Teach patient to arise slowly      Problem: Patient Education: Go to Patient Education Activity  Goal: Patient/Family Education  Outcome: Progressing Towards Goal

## 2020-11-03 NOTE — BH NOTES
0700: PRN Medication Documentation    Specific patient behavior that led to need for PRN medication: c/o tooth pain since past sunday  Staff interventions attempted prior to PRN being given: warm compress  PRN medication given: PO Motrin  Patient response/effectiveness of PRN medication: will continue to monitor

## 2020-11-03 NOTE — INTERDISCIPLINARY ROUNDS
Behavioral Health Interdisciplinary Rounds Patient Name: Val Sierra  Age: 38 y.o. Room/Bed:  730/ Primary Diagnosis: <principal problem not specified> Admission Status: Involuntary Commitment and Forced Medication Order Readmission within 30 days: no 
Power of  in place: no 
Patient requires a blocked bed: no          Reason for blocked bed: VTE Prophylaxis: No 
 
Mobility needs/Fall risk: no 
Flu Vaccine : no  
Nutritional Plan: no 
Consults:         
Labs/Testing due today?: no 
 
Sleep hours:  9.5 Participation in Care/Groups:  yes Medication Compliant?: Yes PRNS (last 24 hours): pain Restraints (last 24 hours):  no 
  
CIWA (range last 24 hours): COWS (range last 24 hours): Alcohol screening (AUDIT) completed -   AUDIT Score: 2 If applicable, date SBIRT discussed in treatment team AND documented:  
AUDIT Screen Score: AUDIT Score: 2 Tobacco - patient is a smoker: Have You Used Tobacco in the Past 30 Days: No 
Illegal Drugs use: Have You Used Any Illegal Substances Over the Past 12 Months: No 
 
24 hour chart check complete: yes Patient goal(s) for today:  
Treatment team focus/goals: Plan to continue to titrate his medications. Progress note : He remains irritable, argumentative and has been loud today. He continues to refuse a long acting injection. LOS:  5  Expected LOS: TBD Financial concerns/prescription coverage:  Medicare Family contact:  Иван [facility] - [phone] Family requesting physician contact today:   
Discharge plan: he will return to his grandmothers Access to weapons : no Outpatient provider(s): [address] [place] [address] Patient's preferred phone number for follow up call :  
Patient's preferred e-mail address : 
Participating treatment team members: Daniel Harris Dr.

## 2020-11-03 NOTE — BH NOTES
GROUP THERAPY PROGRESS NOTE    Patient is participating in Processing Negative Emotions and Worry group. Group time: 50 minutes    Personal goal for participation: Deal with negative emotions. Goal orientation: Personal    Group therapy participation: active    Therapeutic interventions reviewed and discussed: Group discussion was focused on understanding underlying causes of worry: action postponed, unresolved disagreements, guilt and fear of punishment, unrealistic goals, and trying to please everyone. The members in this group identified their individual worries and processed them with the group. Once these were identified, the group discussed different ways to control worrying: write it down, put away the worry for the time being, schedule a worry session, discard problems you cant do anything about, and wind down. Impression of participation: Candie Mcintyre was active in group. Patient identified no worries about himself in group. Engaged in conversation.      Arlette Cardoso, Supervisee in Social Work

## 2020-11-03 NOTE — BH NOTES
GROUP THERAPY PROGRESS NOTE    Patient is participating in Substance Abuse group. Group time: 50 minutes    Personal goal for participation: To identify and understand relapse triggers. Goal orientation: Personal    Group therapy participation: active    Therapeutic interventions reviewed and discussed: Group discussion on ways relapse triggers can affect individuals in recovery that encounter situations that they associate with past drug abuse. Each patient identified their own triggers, and the group discussed most common ones. Group members read aloud mistaken beliefs and myths about relapse and were given the truths behind it. Patients were encouraged to keep a checklist of behaviors and attitudes that are identified as indicators of an approaching relapse in order to interrupt the relapse dynamic and avoid it. Individuals were also given a sheet that included the most common relapse situations, these are called the The Unlucky 13, and members were to identify at least 5 of their own risks. Impression of participation: Dominic Mosley was present in group. He was unable to share information regarding the topic of discussion. He briefly engaged in conversation.     Arlette Cardoso, Supervisee in Social Work

## 2020-11-03 NOTE — BH NOTES
Chief Complaint:  I am good. Length of Stay: 6 Days    Interval History:  Mr. Lance Stark reports that he feels \"good\". He called his grandmother 3 times yesterday and says the third call did not go very well. He hung up on her after an argument. She does not feel that he is ready for discharge yet. He was irritable during the interview, argumentative, sarcastic and loud. He again declined to consider taking an injectable antipsychotic. Past Medical History:  No past medical history on file. Labs:  Lab Results   Component Value Date/Time    WBC 12.1 (H) 10/28/2020 05:35 PM    HGB 15.9 10/28/2020 05:35 PM    HCT 47.0 10/28/2020 05:35 PM    PLATELET 281 57/90/0292 05:35 PM    MCV 91.8 10/28/2020 05:35 PM      Lab Results   Component Value Date/Time    Sodium 134 (L) 10/28/2020 05:35 PM    Potassium 4.0 10/28/2020 05:35 PM    Chloride 101 10/28/2020 05:35 PM    CO2 28 10/28/2020 05:35 PM    Anion gap 5 10/28/2020 05:35 PM    Glucose 95 10/28/2020 05:35 PM    BUN 12 10/28/2020 05:35 PM    Creatinine 1.30 10/28/2020 05:35 PM    BUN/Creatinine ratio 9 (L) 10/28/2020 05:35 PM    GFR est AA >60 10/28/2020 05:35 PM    GFR est non-AA >60 10/28/2020 05:35 PM    Calcium 9.3 10/28/2020 05:35 PM    Bilirubin, total 0.6 10/28/2020 05:35 PM    Alk.  phosphatase 85 10/28/2020 05:35 PM    Protein, total 7.9 10/28/2020 05:35 PM    Albumin 4.4 10/28/2020 05:35 PM    Globulin 3.5 10/28/2020 05:35 PM    A-G Ratio 1.3 10/28/2020 05:35 PM    ALT (SGPT) 59 10/28/2020 05:35 PM      Vitals:    11/02/20 0742 11/02/20 1642 11/02/20 1705 11/03/20 0805   BP: (!) 154/90 (!) 142/82 130/86 130/86   Pulse: 67 81  70   Resp: 16 18  16   Temp: 98.7 °F (37.1 °C) 98.6 °F (37 °C)  97.4 °F (36.3 °C)   SpO2: 100% 100%     Weight:             Current Facility-Administered Medications   Medication Dose Route Frequency Provider Last Rate Last Dose    ARIPiprazole (ABILIFY) tablet 20 mg  20 mg Oral DAILY Randall Butler MD   20 mg at 11/03/20 9407 Or    haloperidol lactate (HALDOL) injection 5 mg  5 mg IntraMUSCular DAILY Dimitry Sauer MD        ibuprofen (MOTRIN) tablet 600 mg  600 mg Oral Q6H PRN Dimitry Sauer MD   600 mg at 11/03/20 0705    OLANZapine (ZyPREXA) tablet 5 mg  5 mg Oral Q6H PRN Padilla-EileenAle jose Presume, NP        haloperidol lactate (HALDOL) injection 5 mg  5 mg IntraMUSCular Q6H PRN Padilla-Eileen, Ale Presume, NP        benztropine (COGENTIN) tablet 1 mg  1 mg Oral BID PRN Padilla-Eileen, Ale Presume, NP        diphenhydrAMINE (BENADRYL) injection 50 mg  50 mg IntraMUSCular BID PRN Padilla-Eileen, Ale Presume, NP        hydrOXYzine HCL (ATARAX) tablet 50 mg  50 mg Oral TID PRN Padilla-Eileen, Ale Presume, NP        LORazepam (ATIVAN) injection 1 mg  1 mg IntraMUSCular Q4H PRN [facility] Ale Presume, NP        traZODone (DESYREL) tablet 50 mg  50 mg Oral QHS PRN Ale Riverae, NP        acetaminophen (TYLENOL) tablet 650 mg  650 mg Oral Q4H PRN Padilla-Eileen, Judy BAER, NP        magnesium hydroxide (MILK OF MAGNESIA) 400 mg/5 mL oral suspension 30 mL  30 mL Oral DAILY PRN Padilla-Eileen, Ale Presume, NP             Mental Status Exam:  Eye contact: limited  Grooming: fair  Psychomotor activity: WNL  Speech is spontaneous  Mood is \"good\"  Affect: irritable  Perception: Denies any AH or VH. Suicidal ideation: Denies any SI or plan. Cognition is grossly intact       Physical Exam:  Body habitus: Body mass index is 23.59 kg/m². Musculoskeletal system: normal gait  Tremor - neg  Cog wheeling - neg      Assessment and Plan:  Justino Rosas meets criteria for a diagnosis of Paranoid schizophrenia  Declines to take a long acting antipsychotic deport medications. Continue the medication regimen as prescribed  Disposition planning to continue.      I certify that this patients inpatient psychiatric hospital services furnished since the previous certification were, and continue to be, required for treatment that could reasonably be expected to improve the patient's condition, or for diagnostic study, and that the patient continues to need, on a daily basis, active treatment furnished directly by or requiring the supervision of inpatient psychiatric facility personnel. In addition, the hospital records show that services furnished were intensive treatment services, admission or related services, or equivalent services.

## 2020-11-04 PROCEDURE — 65220000003 HC RM SEMIPRIVATE PSYCH

## 2020-11-04 PROCEDURE — 74011250637 HC RX REV CODE- 250/637: Performed by: PSYCHIATRY & NEUROLOGY

## 2020-11-04 RX ORDER — ARIPIPRAZOLE 10 MG/1
30 TABLET ORAL ONCE
Status: DISCONTINUED | OUTPATIENT
Start: 2020-11-04 | End: 2020-11-04

## 2020-11-04 RX ORDER — ARIPIPRAZOLE 10 MG/1
10 TABLET ORAL ONCE
Status: DISPENSED | OUTPATIENT
Start: 2020-11-04 | End: 2020-11-05

## 2020-11-04 RX ADMIN — ARIPIPRAZOLE 20 MG: 10 TABLET ORAL at 08:37

## 2020-11-04 NOTE — INTERDISCIPLINARY ROUNDS
Behavioral Health Interdisciplinary Rounds Patient Name: Thang Murphy  Age: 38 y.o. Room/Bed:  730/ Primary Diagnosis: <principal problem not specified> Admission Status: Involuntary Commitment Readmission within 30 days: no 
Power of  in place: no 
Patient requires a blocked bed: no          Reason for blocked bed: VTE Prophylaxis: No 
 
Mobility needs/Fall risk: no 
Flu Vaccine : no  
Nutritional Plan: no 
Consults:         
Labs/Testing due today?: no 
 
Sleep hours: 7 Participation in Care/Groups:  yes Medication Compliant?: Yes PRNS (last 24 hours): None Restraints (last 24 hours):  no 
  
CIWA (range last 24 hours): COWS (range last 24 hours): Alcohol screening (AUDIT) completed -   AUDIT Score: 2 If applicable, date SBIRT discussed in treatment team AND documented:  
AUDIT Screen Score: AUDIT Score: 2 Tobacco - patient is a smoker: Have You Used Tobacco in the Past 30 Days: No 
Illegal Drugs use: Have You Used Any Illegal Substances Over the Past 12 Months: No 
 
24 hour chart check complete: yes Patient goal(s) for today:  
Treatment team focus/goals: Plan to give a long acting injection. Progress note : he reports he is doing great on the unit. LOS:  7  Expected LOS: TBD Financial concerns/prescription coverage:  Medicare Family contact:  Shante Moralesyhb - [phone] Family requesting physician contact today:   
Discharge plan: he will return home to his grandmothers Access to weapons :  no Outpatient provider(s): [address] [place] [address] Patient's preferred phone number for follow up call :  
Patient's preferred e-mail address : 
Participating treatment team members: Violet Sevilla Dr., RN

## 2020-11-04 NOTE — PROGRESS NOTES
Problem: Altered Thought Process (Adult/Pediatric)  Goal: *STG: Complies with medication therapy  Outcome: Progressing Towards Goal  Goal: *STG: Decreased delusional thinking  Outcome: Progressing Towards Goal    Pt appears asleep, respirations even, no signs of distress noted. Will continue to monitor Q15 minutes.

## 2020-11-04 NOTE — BH NOTES
At 1922:  Patient came to NS to advise \"I am not taking any injections. I am willing to take medications by mouth but only. \" Charge Nurse aware. At [phone]:  Patient informed of injection ordered by MD. Patient states [facility] doctor said anything over 10 mg is useless. I need to talk with him first. Nothing is going to be given to me. \"   Pharmacist, Priscilla Herman, is aware of current patient situation. Ok to give 1st injection without patient taking PO medication, Abilify.

## 2020-11-04 NOTE — BH NOTES
Chief Complaint:  I feel great. Length of Stay: 7 Days    Interval History:  Mr. Ami Ramos reports feeling \"great\". Says he spoke to his grand mother several times and it went \"very well\". He remains isolative but has been pleasant and calm during the interview. His grand mother feels that he will not take medications after discharge and he is a good candidate for a long acting injectable antipsychotic. First dose of Abilify Initio will be given today. Denies any AH or VH. Remains sarcastic and argumentative during the interview. Past Medical History:  No past medical history on file. Labs:  Lab Results   Component Value Date/Time    WBC 12.1 (H) 10/28/2020 05:35 PM    HGB 15.9 10/28/2020 05:35 PM    HCT 47.0 10/28/2020 05:35 PM    PLATELET 732 39/07/4434 05:35 PM    MCV 91.8 10/28/2020 05:35 PM      Lab Results   Component Value Date/Time    Sodium 134 (L) 10/28/2020 05:35 PM    Potassium 4.0 10/28/2020 05:35 PM    Chloride 101 10/28/2020 05:35 PM    CO2 28 10/28/2020 05:35 PM    Anion gap 5 10/28/2020 05:35 PM    Glucose 95 10/28/2020 05:35 PM    BUN 12 10/28/2020 05:35 PM    Creatinine 1.30 10/28/2020 05:35 PM    BUN/Creatinine ratio 9 (L) 10/28/2020 05:35 PM    GFR est AA >60 10/28/2020 05:35 PM    GFR est non-AA >60 10/28/2020 05:35 PM    Calcium 9.3 10/28/2020 05:35 PM    Bilirubin, total 0.6 10/28/2020 05:35 PM    Alk.  phosphatase 85 10/28/2020 05:35 PM    Protein, total 7.9 10/28/2020 05:35 PM    Albumin 4.4 10/28/2020 05:35 PM    Globulin 3.5 10/28/2020 05:35 PM    A-G Ratio 1.3 10/28/2020 05:35 PM    ALT (SGPT) 59 10/28/2020 05:35 PM      Vitals:    11/03/20 0805 11/03/20 1750 11/03/20 2016 11/04/20 0727   BP: 130/86 136/85 129/80 (!) 159/92   Pulse: 70 78 85 84   Resp: 16 16 16 16   Temp: 97.4 °F (36.3 °C) 98.9 °F (37.2 °C) 99.1 °F (37.3 °C) 98.8 °F (37.1 °C)   SpO2:  100% 97% 100%   Weight:             Current Facility-Administered Medications   Medication Dose Route Frequency Provider Last Rate Last Dose    ARIPiprazole (ABILIFY) tablet 20 mg  20 mg Oral DAILY Maxine Gilliam MD   20 mg at 11/04/20 1440    Or    haloperidol lactate (HALDOL) injection 5 mg  5 mg IntraMUSCular DAILY Maxine Gilliam MD        ibuprofen (MOTRIN) tablet 600 mg  600 mg Oral Q6H PRN Maxine Gilliam MD   600 mg at 11/03/20 1547    OLANZapine (ZyPREXA) tablet 5 mg  5 mg Oral Q6H PRN Ninoska Rivera, FERMÍN        haloperidol lactate (HALDOL) injection 5 mg  5 mg IntraMUSCular Q6H PRN AdryediNinoska, NP        benztropine (COGENTIN) tablet 1 mg  1 mg Oral BID PRN Ninoska Rivera, NP        diphenhydrAMINE (BENADRYL) injection 50 mg  50 mg IntraMUSCular BID PRN JairoiNinoska, NP        hydrOXYzine HCL (ATARAX) tablet 50 mg  50 mg Oral TID PRN Ninoska Rivera, NP        LORazepam (ATIVAN) injection 1 mg  1 mg IntraMUSCular Q4H PRN Ninoska Rivera, NP        traZODone (DESYREL) tablet 50 mg  50 mg Oral QHS PRN Ninoska Rivera, NP        acetaminophen (TYLENOL) tablet 650 mg  650 mg Oral Q4H PRN Padilla-EileenJudy angulo, NP        magnesium hydroxide (MILK OF MAGNESIA) 400 mg/5 mL oral suspension 30 mL  30 mL Oral DAILY PRN Ninoska Rivera, NP             Mental Status Exam:  Eye contact: limited  Grooming: fair  Psychomotor activity: WNL  Speech is spontaneous  Mood is \"good\"  Affect: irritable  Perception: Denies any AH or VH. Suicidal ideation: Denies any SI or plan. Cognition is grossly intact       Physical Exam:  Body habitus: Body mass index is 23.59 kg/m². Musculoskeletal system: normal gait  Tremor - neg  Cog wheeling - neg      Assessment and Plan:  Mulu Shetty meets criteria for a diagnosis of Paranoid schizophrenia  First dose of Abilify Initio to be given today. Continue the medication regimen as prescribed  Disposition planning to continue.      I certify that this patients inpatient psychiatric hospital services furnished since the previous certification were, and continue to be, required for treatment that could reasonably be expected to improve the patient's condition, or for diagnostic study, and that the patient continues to need, on a daily basis, active treatment furnished directly by or requiring the supervision of inpatient psychiatric facility personnel. In addition, the hospital records show that services furnished were intensive treatment services, admission or related services, or equivalent services.

## 2020-11-04 NOTE — PROGRESS NOTES
Problem: Altered Thought Process (Adult/Pediatric)  Goal: *STG: Complies with medication therapy  Outcome: Progressing Towards Goal     Problem: Patient Education: Go to Patient Education Activity  Goal: Patient/Family Education  Outcome: Progressing Towards Goal     0840  Patient complaint with all scheduled medications. [phone] with patient about upcoming medications (Abilify 10mg, Aristada Initio and [place]). Explained to patient the dosage of the medications and the reason behind needing to take and extra 10mg of Abilify today and the reason for 2 injections in one day. Patient at this time is refusing the medication. 1400  Spoke with Yefri Jiménez, pharmacist, she will discuss with patient.       3083  Patient agreed to medication after speaking with NICKIE Hedrick [facility]

## 2020-11-04 NOTE — BH NOTES
GROUP THERAPY PROGRESS NOTE    Patient is participating in Process Group. Group time: 50 minutes    Personal goal for participation: Removing negative thoughts from your life    Goal orientation: Personal    Group therapy participation: active    Therapeutic interventions reviewed and discussed: Group discussion around negative thoughts and how to remove them from an individuals life. Group members were given a piece of paper that they cut up into sections. On these pieces, patients wrote down negative thoughts or negative things people have told them. They then ripped the sheet of paper and threw it in the trash and explained why that isnt true. Group discussion involved other ways to remove these thoughts: distraction, expressing gratitude, labeling your thoughts, and changing your relationship with your brain. Impression of participation: Patient was active in group. He shared his negative emotion of not being in control of his life and that this was the case his whole life. He was unable to discuss or share why that may not be true or identify anything positive.     Arlette Cardoso, Supervisee in Social Work

## 2020-11-04 NOTE — BH NOTES
GROUP THERAPY PROGRESS NOTE    Patient is participating in Process Group. Group time: 50 minutes    Personal goal for participation: To boost self-esteem and confidence     Goal orientation: Personal    Group therapy participation: active    Therapeutic interventions reviewed and discussed: Everyone in group is to complete the activity Toot your own horn by filling in and completing various sentences about themselves in a positive way. Group discussion around challenges completing this activity and different thoughts that patients pondered upon while doing so. Each member shared their activity, if they wanted to, and discussed things they learned about themselves or where there is room for growth. Impression of participation: Sebastian Aguirre was redirected a few times and encouraged to complete the activity. He completed it and shared it with the group. He was supportive of other group members and positively complimented his personality.      Arlette Cardoso, Supervisee in Social Work

## 2020-11-05 PROCEDURE — 65220000003 HC RM SEMIPRIVATE PSYCH

## 2020-11-05 NOTE — PROGRESS NOTES
Problem: Falls - Risk of  Goal: *Absence of Falls  Description: Document Dawson Krauseovidio Fall Risk and appropriate interventions in the flowsheet. Outcome: Progressing Towards Goal  Note: Fall Risk Interventions:            Medication Interventions: Patient to call before getting OOB            Resting in bed with eyes closed, no complaints, no distress noted. Safety measures in place, will continue to monitor.

## 2020-11-05 NOTE — INTERDISCIPLINARY ROUNDS
Behavioral Health Interdisciplinary Rounds Patient Name: Tanner Goode  Age: 38 y.o. Room/Bed:  730/ Primary Diagnosis: <principal problem not specified> Admission Status: Involuntary Commitment Readmission within 30 days: no 
Power of  in place: no 
Patient requires a blocked bed: no          Reason for blocked bed: VTE Prophylaxis: No 
 
Mobility needs/Fall risk: no 
Flu Vaccine : no  
Nutritional Plan: no 
Consults:         
Labs/Testing due today?: no 
 
Sleep hours:  6.5 Participation in Care/Groups:  yes Medication Compliant?: Yes PRNS (last 24 hours): None Restraints (last 24 hours):  no 
  
CIWA (range last 24 hours): COWS (range last 24 hours): Alcohol screening (AUDIT) completed -   AUDIT Score: 2 If applicable, date SBIRT discussed in treatment team AND documented:  
AUDIT Screen Score: AUDIT Score: 2 Tobacco - patient is a smoker: Have You Used Tobacco in the Past 30 Days: No 
Illegal Drugs use: Have You Used Any Illegal Substances Over the Past 12 Months: No 
 
24 hour chart check complete: yes Patient goal(s) for today:  
Treatment team focus/goals: Plan to work on placement issues Progress note: He has been doing well on the unit. Took his long acting injection. Now requesting to return home with his grandmother. LOS:  8  Expected LOS: TBD Financial concerns/prescription coverage:  Medicare Family contact: Dudley Smith- [phone]                         
Family requesting physician contact today:  
Discharge plan: TBD Access to weapons :  no Outpatient provider(s): [address] [place] [address] Patient's preferred phone number for follow up call :  
Patient's preferred e-mail address : 
Participating treatment team members: Tommy Gatica Dr. , [facility].

## 2020-11-05 NOTE — PROGRESS NOTES
Dex participated in [facility] on 11/5/2020. Rev.  Anaya Almaraz MDiv, [facility], [facility] paging service: [facility] ([phone])

## 2020-11-05 NOTE — BH NOTES
Chief Complaint:  I feel good    Length of Stay: 8 Days    Interval History:  Mr. Seema Connor reports feeling \"good\". Says he took the injection of Abilicale Baez yesterday without any adverse events. Denies any AH or VH. He has been pleasant, calm and cheerful in the milieu. Denies any SI or plan. No AH or VH. Nursing staff note that he has been more interactive with peers and seems more cheerful. Expressing a wish to be discharged home tomorrow. Past Medical History:  No past medical history on file. Labs:  Lab Results   Component Value Date/Time    WBC 12.1 (H) 10/28/2020 05:35 PM    HGB 15.9 10/28/2020 05:35 PM    HCT 47.0 10/28/2020 05:35 PM    PLATELET 580 14/25/6728 05:35 PM    MCV 91.8 10/28/2020 05:35 PM      Lab Results   Component Value Date/Time    Sodium 134 (L) 10/28/2020 05:35 PM    Potassium 4.0 10/28/2020 05:35 PM    Chloride 101 10/28/2020 05:35 PM    CO2 28 10/28/2020 05:35 PM    Anion gap 5 10/28/2020 05:35 PM    Glucose 95 10/28/2020 05:35 PM    BUN 12 10/28/2020 05:35 PM    Creatinine 1.30 10/28/2020 05:35 PM    BUN/Creatinine ratio 9 (L) 10/28/2020 05:35 PM    GFR est AA >60 10/28/2020 05:35 PM    GFR est non-AA >60 10/28/2020 05:35 PM    Calcium 9.3 10/28/2020 05:35 PM    Bilirubin, total 0.6 10/28/2020 05:35 PM    Alk.  phosphatase 85 10/28/2020 05:35 PM    Protein, total 7.9 10/28/2020 05:35 PM    Albumin 4.4 10/28/2020 05:35 PM    Globulin 3.5 10/28/2020 05:35 PM    A-G Ratio 1.3 10/28/2020 05:35 PM    ALT (SGPT) 59 10/28/2020 05:35 PM      Vitals:    11/04/20 1214 11/04/20 1541 11/04/20 1958 11/05/20 0746   BP: 134/88 (!) 142/92 127/86 130/83   Pulse: 82 90 90 99   Resp: 16 14 20 14   Temp: 98.9 °F (37.2 °C) 99.3 °F (37.4 °C) 98.7 °F (37.1 °C) 98.6 °F (37 °C)   SpO2: 99% 100% 97% 100%   Weight:             Current Facility-Administered Medications   Medication Dose Route Frequency Provider Last Rate Last Dose    ARIPiprazole lauroxil (ARISTADA) 882 mg/3.2 mL ER injection 882 mg 882 mg IntraMUSCular Q30D Nini Elizondo MD   882 mg at 11/04/20 2035    ibuprofen (MOTRIN) tablet 600 mg  600 mg Oral Q6H PRN Nini Elizondo MD   600 mg at 11/03/20 1547    OLANZapine (ZyPREXA) tablet 5 mg  5 mg Oral Q6H PRN [facility], NP        haloperidol lactate (HALDOL) injection 5 mg  5 mg IntraMUSCular Q6H PRN [facility], NP        benztropine (COGENTIN) tablet 1 mg  1 mg Oral BID PRN [facility], NP        diphenhydrAMINE (BENADRYL) injection 50 mg  50 mg IntraMUSCular BID PRN [facility], NP        hydrOXYzine HCL (ATARAX) tablet 50 mg  50 mg Oral TID PRN [facility], NP        LORazepam (ATIVAN) injection 1 mg  1 mg IntraMUSCular Q4H PRN [facility], NP        traZODone (DESYREL) tablet 50 mg  50 mg Oral QHS PRN [facility], NP        acetaminophen (TYLENOL) tablet 650 mg  650 mg Oral Q4H PRN Padilla-Eileen [facility], NP        magnesium hydroxide (MILK OF MAGNESIA) 400 mg/5 mL oral suspension 30 mL  30 mL Oral DAILY PRN [facility], NP             Mental Status Exam:  Eye contact: limited  Grooming: fair  Psychomotor activity: WNL  Speech is spontaneous  Mood is \"good\"  Affect: irritable  Perception: Denies any AH or VH. Suicidal ideation: Denies any SI or plan. Cognition is grossly intact       Physical Exam:  Body habitus: Body mass index is 23.59 kg/m². Musculoskeletal system: normal gait  Tremor - neg  Cog wheeling - neg      Assessment and Plan:  Liliana Liu meets criteria for a diagnosis of Paranoid schizophrenia    Continue the medication regimen as prescribed  Disposition planning to continue.      I certify that this patients inpatient psychiatric hospital services furnished since the previous certification were, and continue to be, required for treatment that could reasonably be expected to improve the patient's condition, or for diagnostic study, and that the patient continues to need, on a daily basis, active treatment furnished directly by or requiring the supervision of inpatient psychiatric facility personnel. In addition, the hospital records show that services furnished were intensive treatment services, admission or related services, or equivalent services.

## 2020-11-05 NOTE — PROGRESS NOTES
Problem: Altered Thought Process (Adult/Pediatric)  Goal: *STG: Participates in treatment plan  Outcome: Progressing Towards Goal  Patient calm and cooperative during treatment team.  Patient focused discharge tomorrow, stating he is no longer interested in Adult home placement. Goal: *STG: Complies with medication therapy  Outcome: Progressing Towards Goal  Patient compliant with injections yesterday evening.       Problem: Patient Education: Go to Patient Education Activity  Goal: Patient/Family Education  Outcome: Progressing Towards Goal

## 2020-11-05 NOTE — PROGRESS NOTES
Problem: Discharge Planning  Goal: *Discharge to safe environment  Outcome: Progressing Towards Goal  Note: Working on AH placement   Goal: *Knowledge of medication management  Outcome: Progressing Towards Goal  Goal: *Knowledge of discharge instructions  Outcome: Progressing Towards Goal

## 2020-11-06 VITALS
OXYGEN SATURATION: 99 % | TEMPERATURE: 98.9 F | WEIGHT: 178.8 LBS | RESPIRATION RATE: 16 BRPM | HEART RATE: 89 BPM | DIASTOLIC BLOOD PRESSURE: 75 MMHG | SYSTOLIC BLOOD PRESSURE: 110 MMHG | BODY MASS INDEX: 23.59 KG/M2

## 2020-11-06 NOTE — PROGRESS NOTES
Problem: Altered Thought Process (Adult/Pediatric)  Goal: *STG: Participates in treatment plan  Outcome: Progressing Towards Goal     Problem: Altered Thought Process (Adult/Pediatric)  Goal: *STG: Complies with medication therapy  Outcome: Progressing Towards Goal     Problem: Altered Thought Process (Adult/Pediatric)  Goal: *STG: Attends activities and groups  Outcome: Progressing Towards Goal     Problem: Altered Thought Process (Adult/Pediatric)  Goal: Interventions  Outcome: Progressing Towards Goal  Note: Pt is alert oriented. Calm cooperative. Pleasant. Medication meal compliant. Plan is discharge home today. Pt received discharge instruction, follow up information, crisis plan reviewed. Opportunity for questions and clarification provided. Pt verbalizes understanding of information provided. Pt discharging home with personal belongings.

## 2020-11-06 NOTE — DISCHARGE INSTRUCTIONS
DISCHARGE SUMMARY    Siomara Calvo  : 1985  MRN: [id number]    The patient Ru Ravi exhibits the ability to control behavior in a less restrictive environment. Patient's level of functioning is improving. No assaultive/destructive behavior has been observed for the past 24 hours. No suicidal/homicidal threat or behavior has been observed for the past 24 hours. There is no evidence of serious medication side effects. Patient has not been in physical or protective restraints for at least the past 24 hours. If weapons involved, how are they secured? No weapons involved     Is patient aware of and in agreement with discharge plan? He is aware of discharge and is in agreement     Arrangements for medication:  Prescriptions given to patient    Copy of discharge instructions to provider?:  Yes , fax to Kumar Villavicencio -      Arrangements for transportation home: [place] [id number] all follow up appointments as scheduled, continue to take prescribed medications per physician instructions. Mental health crisis number:  851 or your local mental health crisis line number at [facility] suicide Safety Plan is a document that supports someone when they are having thoughts of suicide. Warning Signs that indicate a suicidal crisis may be developing: What (situations, thoughts, feelings, body sensations, behaviors, etc.) do you experience that lets you know you are beginning to think about suicide? 1. Feeling overwhelmed   2. Suicidal thoughts    Internal Coping Strategies:  What things can I do (relaxation techniques, hobbies, physical activities, etc.) to take my mind off my problems without contacting another person? 1. Music  2. Computer games  3. Working out    [employer] and social settings that provide distraction: Who can I call or where can I go to distract me? 1.  Name: neighbors                                      2. Name:  3. Place: taking a walk        4. Place: talking with neighbors at park    People whom I can ask for help: Who can I call when I need help - for example, friends, family, clergy, someone else? 1. Name: crisis worker at local community service board                                                                                          Professionals or [address] [place] [address] Blvd I can contact during a crisis: Who can I call for help - for example, my doctor, my psychiatrist, my psychologist, a mental health provider, a suicide hotline? 1. [address] [facility] [address]  [phone]     Suicide Prevention Lifeline: 9-618-156-TALK (1695)     [address] [place] [address] Emergency Services -  for example, [address] suicide hotline, [address] [place] [address] Avenue: [facility]      Emergency Services Address: [place] [address]  [phone]      Emergency Services [facility]:[phone]    Making the environment safe: How can I make my environment (house/apartment/living space) safer? For example, can I remove guns, medications, and other items? 1. Put crisis numbers into my phone   2. Keep my medications locked up       DISCHARGE SUMMARY from Nurse    PATIENT INSTRUCTIONS:  What to do at Home:  Recommended activity: Activity as tolerated. If you experience any of the following symptoms hopeless, helpless, thoughts of harming self or others, uncontrolled paranoid thoughts or racing thoughts , please call 911 or your local mental [facility] crisis number [phone]. *  Please give a list of your current medications to your Primary Care Provider. *  Please update this list whenever your medications are discontinued, doses are      changed, or new medications (including over-the-counter products) are added.     *  Please carry medication information at all times in case of emergency situations. These are general instructions for a healthy lifestyle:    No smoking/ No tobacco products/ Avoid exposure to second hand smoke  Surgeon General's Warning:  Quitting smoking now greatly reduces serious risk to your health. Obesity, smoking, and sedentary lifestyle greatly increases your risk for illness    A healthy diet, regular physical exercise & weight monitoring are important for maintaining a healthy lifestyle    You may be retaining fluid if you have a history of heart failure or if you experience any of the following symptoms:  Weight gain of 3 pounds or more overnight or 5 pounds in a week, increased swelling in our hands or feet or shortness of breath while lying flat in bed. Please call your doctor as soon as you notice any of these symptoms; do not wait until your next office visit. The discharge information has been reviewed with the patient. The patient verbalized understanding. Discharge medications reviewed with the patient and appropriate educational materials and side effects teaching were provided.   ___________________________________________________________________________________________________________________________________
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
## 2020-11-06 NOTE — PROGRESS NOTES
Problem: Falls - Risk of  Goal: *Absence of Falls  Description: Document Dick Brandt Fall Risk and appropriate interventions in the flowsheet.   Outcome: Progressing Towards Goal  Note: Fall Risk Interventions:            Medication Interventions: Teach patient to arise slowly       Pt appears asleep in bed, NAD, even respirations, will continue to monitor q15min

## 2020-11-06 NOTE — BH NOTES
GROUP THERAPY PROGRESS NOTE    Danielle Pandya is participating in [employer] / Wellness Group    Group time: 1 hour    Personal goal for participation: Prep for Today and Set Goal(s)     Goal orientation: [employer] Your Wellness/ Coping In [employer])  for Today    Group therapy participation: active    Therapeutic interventions reviewed and discussed: Yes - Focus on My Recovery     Impression of participation: Pt said his mood was happy and leaving today. Pt expressed his thanks  for this journey and planned to stay out of the hospital. Pt did not discuss separation from his grandmother but stated  he was moving to a new place to live. He is excited and looked forward to new life.

## 2020-11-06 NOTE — INTERDISCIPLINARY ROUNDS
Behavioral Health Interdisciplinary Rounds Patient Name: Fernando Downs  Age: 38 y.o. Room/Bed:  730/ Primary Diagnosis: <principal problem not specified> Admission Status: Involuntary Commitment and Forced Medication Order Readmission within 30 days: no 
Power of  in place: no 
Patient requires a blocked bed: no          Reason for blocked bed: VTE Prophylaxis: No 
 
Mobility needs/Fall risk: no 
Flu Vaccine : no  
Nutritional Plan: no 
Consults:         
Labs/Testing due today?: no 
 
Sleep hours:  7 Participation in Care/Groups:  yes Medication Compliant?: Yes PRNS (last 24 hours): None Restraints (last 24 hours):  no 
  
CIWA (range last 24 hours): COWS (range last 24 hours): Alcohol screening (AUDIT) completed -   AUDIT Score: 2 If applicable, date SBIRT discussed in treatment team AND documented:  
AUDIT Screen Score: AUDIT Score: 2 Tobacco - patient is a smoker: Have You Used Tobacco in the Past 30 Days: No 
Illegal Drugs use: Have You Used Any Illegal Substances Over the Past 12 Months: No 
 
24 hour chart check complete: yes Patient goal(s) for today:  
Treatment team focus/goals: Plan for discharge home with grandmother today. Progress note : he has been pleasant and compliant with his treatment LOS:  8  Expected LOS: Today Financial concerns/prescription coverage:  Medicare Family contact:  Grandmother - BERNA spoke to his grandmother today about discharge and follow up Family requesting physician contact today:   
Discharge plan: he will be discharged home Access to weapons :  No      
Outpatient provider(s): Mary Jean Baptiste -  
Patient's preferred phone number for follow up call : [phone] Patient's preferred e-mail address : 
Participating treatment team members: Dallin Irene Dr., RN

## 2022-03-18 PROBLEM — F20.9 SCHIZOPHRENIA (HCC): Status: ACTIVE | Noted: 2020-10-29

## 2023-01-24 NOTE — PROGRESS NOTES
Problem: Altered Thought Process (Adult/Pediatric)  Goal: *STG: Participates in treatment plan  Outcome: Progressing Towards Goal  Goal: *STG: Complies with medication therapy  Outcome: Progressing Towards Goal       Patient compliant with all scheduled medications, took court ordered medications without incident. Problem: Patient Education: Go to Patient Education Activity  Goal: Patient/Family Education  Outcome: Progressing Towards Goal     Patient awake and alert and present in the milieu, seen playing cards and interacting with peers. Minimal interaction with staff. Mood and affect; flat and labile. Interactions limited with patient. Denies SI/HI. Will continue to monitor q15 minutes for safety checks. Statement Selected

## 2023-07-09 ENCOUNTER — HOSPITAL ENCOUNTER (INPATIENT)
Facility: HOSPITAL | Age: 38
LOS: 12 days | Discharge: HOME | DRG: 750 | End: 2023-07-21
Attending: EMERGENCY MEDICINE | Admitting: PSYCHIATRY & NEUROLOGY
Payer: MEDICAID

## 2023-07-09 DIAGNOSIS — F20.0 PARANOID SCHIZOPHRENIA (HCC): Primary | ICD-10-CM

## 2023-07-09 DIAGNOSIS — Z91.89 AT RISK FOR MEDICATION NONCOMPLIANCE: ICD-10-CM

## 2023-07-09 LAB
AMPHET UR QL SCN: NEGATIVE
ANION GAP SERPL CALC-SCNC: 8 MMOL/L (ref 5–15)
APPEARANCE UR: ABNORMAL
BACTERIA URNS QL MICRO: NEGATIVE /HPF
BARBITURATES UR QL SCN: NEGATIVE
BASOPHILS # BLD: 0.1 K/UL (ref 0–0.1)
BASOPHILS NFR BLD: 1 % (ref 0–1)
BENZODIAZ UR QL: NEGATIVE
BILIRUB UR QL: NEGATIVE
BUN SERPL-MCNC: 16 MG/DL (ref 6–20)
BUN/CREAT SERPL: 12 (ref 12–20)
CA-I BLD-MCNC: 9.6 MG/DL (ref 8.5–10.1)
CANNABINOIDS UR QL SCN: NEGATIVE
CAOX CRY URNS QL MICRO: ABNORMAL
CHLORIDE SERPL-SCNC: 108 MMOL/L (ref 97–108)
CO2 SERPL-SCNC: 25 MMOL/L (ref 21–32)
COCAINE UR QL SCN: NEGATIVE
COLOR UR: ABNORMAL
CREAT SERPL-MCNC: 1.32 MG/DL (ref 0.7–1.3)
DIFFERENTIAL METHOD BLD: ABNORMAL
EOSINOPHIL # BLD: 0 K/UL (ref 0–0.4)
EOSINOPHIL NFR BLD: 0 % (ref 0–7)
EPITH CASTS URNS QL MICRO: ABNORMAL /LPF
ERYTHROCYTE [DISTWIDTH] IN BLOOD BY AUTOMATED COUNT: 12.4 % (ref 11.5–14.5)
FLUAV RNA SPEC QL NAA+PROBE: NOT DETECTED
FLUBV RNA SPEC QL NAA+PROBE: NOT DETECTED
GLUCOSE SERPL-MCNC: 124 MG/DL (ref 65–100)
GLUCOSE UR STRIP.AUTO-MCNC: NEGATIVE MG/DL
HCT VFR BLD AUTO: 46.9 % (ref 36.6–50.3)
HGB BLD-MCNC: 15.6 G/DL (ref 12.1–17)
HGB UR QL STRIP: NEGATIVE
IMM GRANULOCYTES # BLD AUTO: 0.1 K/UL (ref 0–0.04)
IMM GRANULOCYTES NFR BLD AUTO: 0 % (ref 0–0.5)
KETONES UR QL STRIP.AUTO: 5 MG/DL
LEUKOCYTE ESTERASE UR QL STRIP.AUTO: NEGATIVE
LYMPHOCYTES # BLD: 1.9 K/UL (ref 0.8–3.5)
LYMPHOCYTES NFR BLD: 12 % (ref 12–49)
Lab: NORMAL
MCH RBC QN AUTO: 30.6 PG (ref 26–34)
MCHC RBC AUTO-ENTMCNC: 33.3 G/DL (ref 30–36.5)
MCV RBC AUTO: 92.1 FL (ref 80–99)
METHADONE UR QL: NEGATIVE
MONOCYTES # BLD: 0.9 K/UL (ref 0–1)
MONOCYTES NFR BLD: 6 % (ref 5–13)
MUCOUS THREADS URNS QL MICRO: ABNORMAL /LPF
NEUTS SEG # BLD: 13 K/UL (ref 1.8–8)
NEUTS SEG NFR BLD: 81 % (ref 32–75)
NITRITE UR QL STRIP.AUTO: NEGATIVE
NRBC # BLD: 0 K/UL (ref 0–0.01)
NRBC BLD-RTO: 0 PER 100 WBC
OPIATES UR QL: NEGATIVE
PCP UR QL: NEGATIVE
PH UR STRIP: 5 (ref 5–8)
PLATELET # BLD AUTO: 343 K/UL (ref 150–400)
PMV BLD AUTO: 9.6 FL (ref 8.9–12.9)
POTASSIUM SERPL-SCNC: 4 MMOL/L (ref 3.5–5.1)
PROT UR STRIP-MCNC: 30 MG/DL
RBC # BLD AUTO: 5.09 M/UL (ref 4.1–5.7)
RBC #/AREA URNS HPF: ABNORMAL /HPF (ref 0–5)
SARS-COV-2 RNA RESP QL NAA+PROBE: NOT DETECTED
SODIUM SERPL-SCNC: 141 MMOL/L (ref 136–145)
SP GR UR REFRACTOMETRY: >1.03 (ref 1–1.03)
URINE CULTURE IF INDICATED: ABNORMAL
UROBILINOGEN UR QL STRIP.AUTO: 2 EU/DL (ref 0.1–1)
WBC # BLD AUTO: 16 K/UL (ref 4.1–11.1)
WBC URNS QL MICRO: ABNORMAL /HPF (ref 0–4)

## 2023-07-09 PROCEDURE — 80307 DRUG TEST PRSMV CHEM ANLYZR: CPT

## 2023-07-09 PROCEDURE — 80048 BASIC METABOLIC PNL TOTAL CA: CPT

## 2023-07-09 PROCEDURE — 85025 COMPLETE CBC W/AUTO DIFF WBC: CPT

## 2023-07-09 PROCEDURE — 87636 SARSCOV2 & INF A&B AMP PRB: CPT

## 2023-07-09 PROCEDURE — 36415 COLL VENOUS BLD VENIPUNCTURE: CPT

## 2023-07-09 PROCEDURE — 6370000000 HC RX 637 (ALT 250 FOR IP): Performed by: PSYCHIATRY & NEUROLOGY

## 2023-07-09 PROCEDURE — 99285 EMERGENCY DEPT VISIT HI MDM: CPT

## 2023-07-09 PROCEDURE — 81001 URINALYSIS AUTO W/SCOPE: CPT

## 2023-07-09 PROCEDURE — 6370000000 HC RX 637 (ALT 250 FOR IP): Performed by: PHYSICIAN ASSISTANT

## 2023-07-09 PROCEDURE — 1240000000 HC EMOTIONAL WELLNESS R&B

## 2023-07-09 RX ORDER — LORAZEPAM 2 MG/ML
1 INJECTION INTRAMUSCULAR EVERY 6 HOURS PRN
Status: DISCONTINUED | OUTPATIENT
Start: 2023-07-09 | End: 2023-07-21 | Stop reason: HOSPADM

## 2023-07-09 RX ORDER — MAGNESIUM HYDROXIDE/ALUMINUM HYDROXICE/SIMETHICONE 120; 1200; 1200 MG/30ML; MG/30ML; MG/30ML
30 SUSPENSION ORAL EVERY 6 HOURS PRN
Status: DISCONTINUED | OUTPATIENT
Start: 2023-07-09 | End: 2023-07-21 | Stop reason: HOSPADM

## 2023-07-09 RX ORDER — ACETAMINOPHEN 325 MG/1
650 TABLET ORAL EVERY 4 HOURS PRN
Status: DISCONTINUED | OUTPATIENT
Start: 2023-07-09 | End: 2023-07-21 | Stop reason: HOSPADM

## 2023-07-09 RX ORDER — ZIPRASIDONE HYDROCHLORIDE 20 MG/1
20 CAPSULE ORAL EVERY 12 HOURS PRN
Status: DISCONTINUED | OUTPATIENT
Start: 2023-07-09 | End: 2023-07-21 | Stop reason: HOSPADM

## 2023-07-09 RX ORDER — LORAZEPAM 1 MG/1
1 TABLET ORAL EVERY 6 HOURS PRN
Status: DISCONTINUED | OUTPATIENT
Start: 2023-07-09 | End: 2023-07-21 | Stop reason: HOSPADM

## 2023-07-09 RX ORDER — MAGNESIUM HYDROXIDE/ALUMINUM HYDROXICE/SIMETHICONE 120; 1200; 1200 MG/30ML; MG/30ML; MG/30ML
30 SUSPENSION ORAL EVERY 6 HOURS PRN
Status: CANCELLED | OUTPATIENT
Start: 2023-07-09

## 2023-07-09 RX ORDER — TRAZODONE HYDROCHLORIDE 50 MG/1
50 TABLET ORAL NIGHTLY PRN
Status: CANCELLED | OUTPATIENT
Start: 2023-07-09

## 2023-07-09 RX ORDER — ZIPRASIDONE HYDROCHLORIDE 20 MG/1
20 CAPSULE ORAL EVERY 12 HOURS PRN
Status: CANCELLED | OUTPATIENT
Start: 2023-07-09

## 2023-07-09 RX ORDER — LORAZEPAM 1 MG/1
1 TABLET ORAL EVERY 6 HOURS PRN
Status: CANCELLED | OUTPATIENT
Start: 2023-07-09

## 2023-07-09 RX ORDER — ZIPRASIDONE MESYLATE 20 MG/ML
20 INJECTION, POWDER, LYOPHILIZED, FOR SOLUTION INTRAMUSCULAR EVERY 12 HOURS PRN
Status: DISCONTINUED | OUTPATIENT
Start: 2023-07-09 | End: 2023-07-21 | Stop reason: HOSPADM

## 2023-07-09 RX ORDER — ACETAMINOPHEN 325 MG/1
650 TABLET ORAL EVERY 4 HOURS PRN
Status: CANCELLED | OUTPATIENT
Start: 2023-07-09

## 2023-07-09 RX ORDER — HYDROXYZINE HYDROCHLORIDE 25 MG/1
50 TABLET, FILM COATED ORAL 3 TIMES DAILY PRN
Status: CANCELLED | OUTPATIENT
Start: 2023-07-09

## 2023-07-09 RX ORDER — LORAZEPAM 2 MG/ML
1 INJECTION INTRAMUSCULAR EVERY 6 HOURS PRN
Status: CANCELLED | OUTPATIENT
Start: 2023-07-09

## 2023-07-09 RX ORDER — TRAZODONE HYDROCHLORIDE 50 MG/1
50 TABLET ORAL NIGHTLY PRN
Status: DISCONTINUED | OUTPATIENT
Start: 2023-07-09 | End: 2023-07-21 | Stop reason: HOSPADM

## 2023-07-09 RX ORDER — ZIPRASIDONE MESYLATE 20 MG/ML
20 INJECTION, POWDER, LYOPHILIZED, FOR SOLUTION INTRAMUSCULAR EVERY 12 HOURS PRN
Status: CANCELLED | OUTPATIENT
Start: 2023-07-09

## 2023-07-09 RX ADMIN — ZIPRASIDONE HYDROCHLORIDE 20 MG: 20 CAPSULE ORAL at 16:46

## 2023-07-09 RX ADMIN — LORAZEPAM 1 MG: 1 TABLET ORAL at 16:46

## 2023-07-09 ASSESSMENT — PAIN SCALES - GENERAL: PAINLEVEL_OUTOF10: 0

## 2023-07-09 ASSESSMENT — SLEEP AND FATIGUE QUESTIONNAIRES
AVERAGE NUMBER OF SLEEP HOURS: 0
DO YOU HAVE DIFFICULTY SLEEPING: YES
DO YOU USE A SLEEP AID: NO

## 2023-07-09 ASSESSMENT — LIFESTYLE VARIABLES
HOW MANY STANDARD DRINKS CONTAINING ALCOHOL DO YOU HAVE ON A TYPICAL DAY: PATIENT DOES NOT DRINK
HOW OFTEN DO YOU HAVE A DRINK CONTAINING ALCOHOL: NEVER

## 2023-07-09 ASSESSMENT — PAIN - FUNCTIONAL ASSESSMENT: PAIN_FUNCTIONAL_ASSESSMENT: NONE - DENIES PAIN

## 2023-07-10 PROCEDURE — 6370000000 HC RX 637 (ALT 250 FOR IP): Performed by: PSYCHIATRY & NEUROLOGY

## 2023-07-10 PROCEDURE — 6370000000 HC RX 637 (ALT 250 FOR IP): Performed by: PHYSICIAN ASSISTANT

## 2023-07-10 PROCEDURE — 1240000000 HC EMOTIONAL WELLNESS R&B

## 2023-07-10 PROCEDURE — 6360000002 HC RX W HCPCS: Performed by: PSYCHIATRY & NEUROLOGY

## 2023-07-10 RX ORDER — ZIPRASIDONE HYDROCHLORIDE 20 MG/1
20 CAPSULE ORAL 2 TIMES DAILY WITH MEALS
Status: DISCONTINUED | OUTPATIENT
Start: 2023-07-10 | End: 2023-07-21 | Stop reason: HOSPADM

## 2023-07-10 RX ORDER — HYDROXYZINE 50 MG/1
50 TABLET, FILM COATED ORAL EVERY 6 HOURS PRN
Status: DISCONTINUED | OUTPATIENT
Start: 2023-07-10 | End: 2023-07-21 | Stop reason: HOSPADM

## 2023-07-10 RX ADMIN — LORAZEPAM 1 MG: 1 TABLET ORAL at 06:46

## 2023-07-10 RX ADMIN — LORAZEPAM 1 MG: 1 TABLET ORAL at 14:16

## 2023-07-10 RX ADMIN — ZIPRASIDONE HYDROCHLORIDE 20 MG: 20 CAPSULE ORAL at 04:29

## 2023-07-10 RX ADMIN — ZIPRASIDONE HYDROCHLORIDE 20 MG: 20 CAPSULE ORAL at 08:05

## 2023-07-10 RX ADMIN — ZIPRASIDONE HYDROCHLORIDE 20 MG: 20 CAPSULE ORAL at 18:46

## 2023-07-10 RX ADMIN — HYDROXYZINE HYDROCHLORIDE 50 MG: 50 TABLET, FILM COATED ORAL at 08:06

## 2023-07-10 RX ADMIN — ARIPIPRAZOLE 300 MG: KIT at 12:38

## 2023-07-10 RX ADMIN — LORAZEPAM 1 MG: 1 TABLET ORAL at 01:08

## 2023-07-11 LAB
ERYTHROCYTE [DISTWIDTH] IN BLOOD BY AUTOMATED COUNT: 12 % (ref 11.5–14.5)
HCT VFR BLD AUTO: 43.8 % (ref 36.6–50.3)
HGB BLD-MCNC: 14.9 G/DL (ref 12.1–17)
MCH RBC QN AUTO: 30.3 PG (ref 26–34)
MCHC RBC AUTO-ENTMCNC: 34 G/DL (ref 30–36.5)
MCV RBC AUTO: 89 FL (ref 80–99)
NRBC # BLD: 0 K/UL (ref 0–0.01)
NRBC BLD-RTO: 0 PER 100 WBC
PLATELET # BLD AUTO: 354 K/UL (ref 150–400)
PMV BLD AUTO: 9.8 FL (ref 8.9–12.9)
RBC # BLD AUTO: 4.92 M/UL (ref 4.1–5.7)
WBC # BLD AUTO: 11.5 K/UL (ref 4.1–11.1)

## 2023-07-11 PROCEDURE — 6370000000 HC RX 637 (ALT 250 FOR IP): Performed by: PSYCHIATRY & NEUROLOGY

## 2023-07-11 PROCEDURE — 85027 COMPLETE CBC AUTOMATED: CPT

## 2023-07-11 PROCEDURE — 1240000000 HC EMOTIONAL WELLNESS R&B

## 2023-07-11 PROCEDURE — 36415 COLL VENOUS BLD VENIPUNCTURE: CPT

## 2023-07-11 RX ADMIN — ZIPRASIDONE HYDROCHLORIDE 20 MG: 20 CAPSULE ORAL at 17:08

## 2023-07-11 RX ADMIN — LORAZEPAM 1 MG: 1 TABLET ORAL at 07:40

## 2023-07-11 RX ADMIN — ZIPRASIDONE HYDROCHLORIDE 20 MG: 20 CAPSULE ORAL at 08:13

## 2023-07-11 ASSESSMENT — PAIN SCALES - GENERAL: PAINLEVEL_OUTOF10: 0

## 2023-07-12 PROCEDURE — 1240000000 HC EMOTIONAL WELLNESS R&B

## 2023-07-12 PROCEDURE — 6370000000 HC RX 637 (ALT 250 FOR IP): Performed by: PSYCHIATRY & NEUROLOGY

## 2023-07-12 RX ADMIN — ZIPRASIDONE HYDROCHLORIDE 20 MG: 20 CAPSULE ORAL at 08:55

## 2023-07-13 PROCEDURE — 1240000000 HC EMOTIONAL WELLNESS R&B

## 2023-07-13 PROCEDURE — 6370000000 HC RX 637 (ALT 250 FOR IP): Performed by: PSYCHIATRY & NEUROLOGY

## 2023-07-13 RX ADMIN — ZIPRASIDONE HYDROCHLORIDE 20 MG: 20 CAPSULE ORAL at 08:51

## 2023-07-13 RX ADMIN — ZIPRASIDONE HYDROCHLORIDE 20 MG: 20 CAPSULE ORAL at 17:19

## 2023-07-14 PROCEDURE — 1240000000 HC EMOTIONAL WELLNESS R&B

## 2023-07-14 PROCEDURE — 6370000000 HC RX 637 (ALT 250 FOR IP): Performed by: PSYCHIATRY & NEUROLOGY

## 2023-07-14 RX ADMIN — ZIPRASIDONE HYDROCHLORIDE 20 MG: 20 CAPSULE ORAL at 08:54

## 2023-07-14 RX ADMIN — ZIPRASIDONE HYDROCHLORIDE 20 MG: 20 CAPSULE ORAL at 17:57

## 2023-07-15 PROCEDURE — 1240000000 HC EMOTIONAL WELLNESS R&B

## 2023-07-15 PROCEDURE — 6370000000 HC RX 637 (ALT 250 FOR IP): Performed by: PSYCHIATRY & NEUROLOGY

## 2023-07-15 PROCEDURE — 6360000002 HC RX W HCPCS: Performed by: PSYCHIATRY & NEUROLOGY

## 2023-07-15 RX ADMIN — ZIPRASIDONE HYDROCHLORIDE 20 MG: 20 CAPSULE ORAL at 17:05

## 2023-07-15 RX ADMIN — ARIPIPRAZOLE 300 MG: KIT at 20:57

## 2023-07-15 RX ADMIN — ZIPRASIDONE HYDROCHLORIDE 20 MG: 20 CAPSULE ORAL at 08:54

## 2023-07-16 PROCEDURE — 6370000000 HC RX 637 (ALT 250 FOR IP): Performed by: PSYCHIATRY & NEUROLOGY

## 2023-07-16 PROCEDURE — 1240000000 HC EMOTIONAL WELLNESS R&B

## 2023-07-16 RX ADMIN — ZIPRASIDONE HYDROCHLORIDE 20 MG: 20 CAPSULE ORAL at 17:14

## 2023-07-16 ASSESSMENT — PAIN SCALES - GENERAL: PAINLEVEL_OUTOF10: 0

## 2023-07-17 PROCEDURE — 1240000000 HC EMOTIONAL WELLNESS R&B

## 2023-07-17 PROCEDURE — 6370000000 HC RX 637 (ALT 250 FOR IP): Performed by: PSYCHIATRY & NEUROLOGY

## 2023-07-17 RX ADMIN — ZIPRASIDONE HYDROCHLORIDE 20 MG: 20 CAPSULE ORAL at 08:18

## 2023-07-17 RX ADMIN — ZIPRASIDONE HYDROCHLORIDE 20 MG: 20 CAPSULE ORAL at 17:08

## 2023-07-17 NOTE — GROUP NOTE
Group Therapy Note    Date: 7/17/2023    Group Start Time: 5274  Group End Time: 3560  Group Topic: Recreational    SSR 2 BH NON ACUTE    Emeli Jett        Group Therapy Note    Facilitated leisure skills group to reinforce positive coping and to manage mood through music, social interaction, group activities and art task     Attendees: 8/11       Patient's Goal:  Client will learn and demonstrate improved communication skills    Notes:  Came to group late. Pt was receptive to listening to music. Interacted when prompted. Declined to work on leisure task    Status After Intervention:  Improved    Participation Level:  Active Listener    Participation Quality: Appropriate      Speech:  normal      Thought Process/Content: Logical      Affective Functioning: Congruent      Mood:  Calm      Level of consciousness:  Alert      Response to Learning: Progressing to goal      Endings: None Reported    Modes of Intervention: Socialization and Activity      Discipline Responsible: Recreational Therapist      Signature:  Jeff Hall

## 2023-07-17 NOTE — GROUP NOTE
Group Therapy Note    Date: 7/17/2023    Group Start Time: 1115  Group End Time: 1200  Group Topic: Education Group - Inpatient    SSR 2 BH NON ACUTE    Nigel Mcclure        Group Therapy Note    Facilitated group to introduce the definition of self-esteem and discuss information relating to creating steps to greater self-appreciation and recognizing symptoms of self-defeat     Attendees: 7/12       Patient's Goal:  Client will learn and demonstrate improved communication skills    Notes:  Receptive to information discussed and engaged with prompting. Pt was able to identify a personal symptom of self-defeat and shared a positive way to help boost his self-esteem     Status After Intervention:  Improved    Participation Level:  Active Listener and Interactive with prompting    Participation Quality: Appropriate and Sharing      Speech:  normal      Thought Process/Content: Logical      Affective Functioning: Congruent      Mood:  Calm      Level of consciousness:  Alert      Response to Learning: Progressing to goal      Endings: None Reported    Modes of Intervention: Education and Support      Discipline Responsible: Recreational Therapist      Signature:  Jeff Hallman

## 2023-07-18 PROCEDURE — 6370000000 HC RX 637 (ALT 250 FOR IP): Performed by: PSYCHIATRY & NEUROLOGY

## 2023-07-18 PROCEDURE — 1240000000 HC EMOTIONAL WELLNESS R&B

## 2023-07-18 RX ADMIN — ZIPRASIDONE HYDROCHLORIDE 20 MG: 20 CAPSULE ORAL at 08:52

## 2023-07-18 RX ADMIN — ZIPRASIDONE HYDROCHLORIDE 20 MG: 20 CAPSULE ORAL at 16:36

## 2023-07-18 ASSESSMENT — PAIN SCALES - GENERAL: PAINLEVEL_OUTOF10: 0

## 2023-07-18 NOTE — PLAN OF CARE
Problem: Sleep Disturbance  Goal: Will exhibit normal sleeping pattern  Description: INTERVENTIONS:  1. Administer medication as ordered  2. Decrease environmental stimuli, including noise, as appropriate  3. Discourage social isolation and naps during the day  Outcome: Progressing     Problem: Anxiety  Goal: Will report anxiety at manageable levels  Description: INTERVENTIONS:  1. Administer medication as ordered  2. Teach and rehearse alternative coping skills  3. Provide emotional support with 1:1 interaction with staff  Outcome: Progressing     Problem: Behavior  Goal: Pt/Family maintain appropriate behavior and adhere to behavioral management agreement, if implemented  Description: INTERVENTIONS:  1. Assess patient/family's coping skills and  non-compliant behavior (including use of illegal substances)  2. Notify security of behavior or suspected illegal substances which indicate the need for search of the family and/or belongings  3. Encourage verbalization of thoughts and concerns in a socially appropriate manner  4. Utilize positive, consistent limit setting strategies supporting safety of patient, staff and others  5. Encourage participation in the decision making process about the behavioral management agreement  6. If a visitor's behavior poses a threat to safety call refer to organization policy.   7. Initiate consult with , Psychosocial CNS, Spiritual Care as appropriate  Outcome: Progressing

## 2023-07-19 PROCEDURE — 1240000000 HC EMOTIONAL WELLNESS R&B

## 2023-07-19 PROCEDURE — 6370000000 HC RX 637 (ALT 250 FOR IP): Performed by: PSYCHIATRY & NEUROLOGY

## 2023-07-19 RX ADMIN — ZIPRASIDONE HYDROCHLORIDE 20 MG: 20 CAPSULE ORAL at 08:51

## 2023-07-19 RX ADMIN — ZIPRASIDONE HYDROCHLORIDE 20 MG: 20 CAPSULE ORAL at 17:06

## 2023-07-19 NOTE — GROUP NOTE
Group Therapy Note    Date: 7/18/2023    Group Start Time: 1945  Group End Time: 2030  Group Topic: Recreational    SSR 2  NON ACUTE    Emmanuel Kiran        Group Therapy Note    Attendees: 9/11    Recreational Therapist facilitated structured leisure skills group to introduce healthy leisure skills as positive way to cope and manage mood. Patient's Goal:  Client will learn and demonstrate improved communication skills    Notes: Attended group. Listened to songs played in group. Was cooperative and receptive to intervention. Responded to staff with cues/prompts and encouragement to participate. Status After Intervention:  Improved    Participation Level: Active Listener    Participation Quality: Attentive      Speech:  normal      Thought Process/Content: Logical      Affective Functioning: Congruent      Mood:  calm      Level of consciousness:  Attentive      Response to Learning: Progressing to goal      Endings: None Reported    Modes of Intervention: Activity      Discipline Responsible: Recreational Therapist      Signature:   LIEN Mayes

## 2023-07-19 NOTE — PLAN OF CARE
Problem: Samantha  Goal: Will exhibit normal sleep and speech and no impulsivity  Description: INTERVENTIONS:  1. Administer medication as ordered  2. Set limits on impulsive behavior  3. Make attempts to decrease external stimuli as possible  Outcome: Adequate for Discharge     Problem: Psychosis  Goal: Will report no hallucinations or delusions  Description: INTERVENTIONS:  1. Administer medication as  ordered  2. Assist with reality testing to support increasing orientation  3. Assess if patient's hallucinations or delusions are encouraging self harm or harm to others and intervene as appropriate  Outcome: Adequate for Discharge     Problem: Behavior  Goal: Pt/Family maintain appropriate behavior and adhere to behavioral management agreement, if implemented  Description: INTERVENTIONS:  1. Assess patient/family's coping skills and  non-compliant behavior (including use of illegal substances)  2. Notify security of behavior or suspected illegal substances which indicate the need for search of the family and/or belongings  3. Encourage verbalization of thoughts and concerns in a socially appropriate manner  4. Utilize positive, consistent limit setting strategies supporting safety of patient, staff and others  5. Encourage participation in the decision making process about the behavioral management agreement  6. If a visitor's behavior poses a threat to safety call refer to organization policy.   7. Initiate consult with , Psychosocial CNS, Spiritual Care as appropriate  Outcome: Progressing

## 2023-07-20 PROCEDURE — 6370000000 HC RX 637 (ALT 250 FOR IP): Performed by: PSYCHIATRY & NEUROLOGY

## 2023-07-20 PROCEDURE — 1240000000 HC EMOTIONAL WELLNESS R&B

## 2023-07-20 RX ADMIN — ZIPRASIDONE HYDROCHLORIDE 20 MG: 20 CAPSULE ORAL at 08:21

## 2023-07-20 RX ADMIN — ZIPRASIDONE HYDROCHLORIDE 20 MG: 20 CAPSULE ORAL at 16:50

## 2023-07-20 NOTE — GROUP NOTE
Group Therapy Note    Date: 7/19/2023    Group Start Time: 1945  Group End Time: 2030  Group Topic: Recreational    SSR 2 BH NON ACUTE    Donaldo Miller        Group Therapy Note    Attendees: 5/8    Recreational Therapist facilitated structured leisure skills group to introduce healthy leisure skills as positive way to cope and manage mood. Notes:  Did not attend group despite encouragement    Discipline Responsible: Recreational Therapist      Signature:   LIEN Johnson

## 2023-07-20 NOTE — GROUP NOTE
Group Therapy Note    Date: 7/20/2023    Group Start Time: 1120  Group End Time: 1200  Group Topic: Education Group - Inpatient    SSR 2 BH NON ACUTE    Dolores Turcios        Group Therapy Note    Facilitated discussion focused on different types of negative behaviors that escalate conflict and damage in relationships.  Introduced healthy strategies to counteract negative behaviors to help resolve conflict and encourage positive feelings     Attendees: 5/11      Notes:  Encouraged but did not attend    Discipline Responsible: Recreational Therapist      Signature:  Jeff Drew

## 2023-07-20 NOTE — GROUP NOTE
Group Therapy Note    Date: 7/20/2023    Group Start Time: 0919  Group End Time: 4175  Group Topic: Recreational    SSR 2 BH NON ACUTE    Germaine Monet        Group Therapy Note    Facilitated leisure skills group to reinforce positive coping and to manage mood through music, social interaction, group activities and art task     Attendees: 5/11       Notes:  Encouraged but did not attend    Discipline Responsible: Recreational Therapist      Signature:  Jeff Conway

## 2023-07-21 VITALS
WEIGHT: 215 LBS | DIASTOLIC BLOOD PRESSURE: 91 MMHG | RESPIRATION RATE: 18 BRPM | OXYGEN SATURATION: 100 % | SYSTOLIC BLOOD PRESSURE: 126 MMHG | TEMPERATURE: 98.4 F | HEART RATE: 82 BPM | BODY MASS INDEX: 28.49 KG/M2 | HEIGHT: 73 IN

## 2023-07-21 PROCEDURE — 6370000000 HC RX 637 (ALT 250 FOR IP): Performed by: PSYCHIATRY & NEUROLOGY

## 2023-07-21 RX ORDER — ZIPRASIDONE HYDROCHLORIDE 20 MG/1
20 CAPSULE ORAL 2 TIMES DAILY WITH MEALS
Qty: 60 CAPSULE | Refills: 3 | Status: SHIPPED | OUTPATIENT
Start: 2023-07-21

## 2023-07-21 RX ORDER — TRAZODONE HYDROCHLORIDE 50 MG/1
50 TABLET ORAL NIGHTLY PRN
Qty: 30 TABLET | Refills: 0 | Status: SHIPPED | OUTPATIENT
Start: 2023-07-21

## 2023-07-21 RX ADMIN — ZIPRASIDONE HYDROCHLORIDE 20 MG: 20 CAPSULE ORAL at 08:30

## 2024-03-21 ENCOUNTER — HOSPITAL ENCOUNTER (EMERGENCY)
Facility: HOSPITAL | Age: 39
Discharge: ANOTHER HOSPITAL | End: 2024-03-22
Attending: EMERGENCY MEDICINE
Payer: MEDICAID

## 2024-03-21 DIAGNOSIS — F23 ACUTE PSYCHOSIS (HCC): Primary | ICD-10-CM

## 2024-03-21 LAB
ALBUMIN SERPL-MCNC: 4.4 G/DL (ref 3.5–5)
ALBUMIN/GLOB SERPL: 1.2 (ref 1.1–2.2)
ALP SERPL-CCNC: 104 U/L (ref 45–117)
ALT SERPL-CCNC: 36 U/L (ref 12–78)
AMPHET UR QL SCN: NEGATIVE
ANION GAP SERPL CALC-SCNC: 6 MMOL/L (ref 5–15)
APPEARANCE UR: CLEAR
AST SERPL W P-5'-P-CCNC: 31 U/L (ref 15–37)
BACTERIA URNS QL MICRO: NEGATIVE /HPF
BARBITURATES UR QL SCN: NEGATIVE
BASOPHILS # BLD: 0.1 K/UL (ref 0–0.1)
BASOPHILS NFR BLD: 1 % (ref 0–1)
BENZODIAZ UR QL: NEGATIVE
BILIRUB SERPL-MCNC: 0.7 MG/DL (ref 0.2–1)
BILIRUB UR QL: NEGATIVE
BUN SERPL-MCNC: 15 MG/DL (ref 6–20)
BUN/CREAT SERPL: 12 (ref 12–20)
CA-I BLD-MCNC: 9.8 MG/DL (ref 8.5–10.1)
CANNABINOIDS UR QL SCN: NEGATIVE
CHLORIDE SERPL-SCNC: 108 MMOL/L (ref 97–108)
CO2 SERPL-SCNC: 25 MMOL/L (ref 21–32)
COCAINE UR QL SCN: NEGATIVE
COLOR UR: ABNORMAL
CREAT SERPL-MCNC: 1.24 MG/DL (ref 0.7–1.3)
DIFFERENTIAL METHOD BLD: ABNORMAL
EOSINOPHIL # BLD: 0 K/UL (ref 0–0.4)
EOSINOPHIL NFR BLD: 0 % (ref 0–7)
EPITH CASTS URNS QL MICRO: ABNORMAL /LPF
ERYTHROCYTE [DISTWIDTH] IN BLOOD BY AUTOMATED COUNT: 12.7 % (ref 11.5–14.5)
ETHANOL SERPL-MCNC: <10 MG/DL (ref 0–0.08)
FLUAV RNA SPEC QL NAA+PROBE: NOT DETECTED
FLUBV RNA SPEC QL NAA+PROBE: NOT DETECTED
GLOBULIN SER CALC-MCNC: 3.7 G/DL (ref 2–4)
GLUCOSE SERPL-MCNC: 83 MG/DL (ref 65–100)
GLUCOSE UR STRIP.AUTO-MCNC: NEGATIVE MG/DL
HCT VFR BLD AUTO: 43.7 % (ref 36.6–50.3)
HGB BLD-MCNC: 14.6 G/DL (ref 12.1–17)
HGB UR QL STRIP: NEGATIVE
IMM GRANULOCYTES # BLD AUTO: 0.1 K/UL (ref 0–0.04)
IMM GRANULOCYTES NFR BLD AUTO: 0 % (ref 0–0.5)
KETONES UR QL STRIP.AUTO: 80 MG/DL
LEUKOCYTE ESTERASE UR QL STRIP.AUTO: NEGATIVE
LYMPHOCYTES # BLD: 1.3 K/UL (ref 0.8–3.5)
LYMPHOCYTES NFR BLD: 8 % (ref 12–49)
Lab: NORMAL
MCH RBC QN AUTO: 30.4 PG (ref 26–34)
MCHC RBC AUTO-ENTMCNC: 33.4 G/DL (ref 30–36.5)
MCV RBC AUTO: 91 FL (ref 80–99)
METHADONE UR QL: NEGATIVE
MONOCYTES # BLD: 0.7 K/UL (ref 0–1)
MONOCYTES NFR BLD: 4 % (ref 5–13)
MUCOUS THREADS URNS QL MICRO: ABNORMAL /LPF
NEUTS SEG # BLD: 14.1 K/UL (ref 1.8–8)
NEUTS SEG NFR BLD: 87 % (ref 32–75)
NITRITE UR QL STRIP.AUTO: NEGATIVE
NRBC # BLD: 0 K/UL (ref 0–0.01)
NRBC BLD-RTO: 0 PER 100 WBC
OPIATES UR QL: NEGATIVE
PCP UR QL: NEGATIVE
PH UR STRIP: 5 (ref 5–8)
PLATELET # BLD AUTO: 381 K/UL (ref 150–400)
PMV BLD AUTO: 10.2 FL (ref 8.9–12.9)
POTASSIUM SERPL-SCNC: 3.6 MMOL/L (ref 3.5–5.1)
PROT SERPL-MCNC: 8.1 G/DL (ref 6.4–8.2)
PROT UR STRIP-MCNC: NEGATIVE MG/DL
RBC # BLD AUTO: 4.8 M/UL (ref 4.1–5.7)
RBC #/AREA URNS HPF: ABNORMAL /HPF (ref 0–5)
SARS-COV-2 RNA RESP QL NAA+PROBE: NOT DETECTED
SODIUM SERPL-SCNC: 139 MMOL/L (ref 136–145)
SP GR UR REFRACTOMETRY: 1.03 (ref 1–1.03)
UROBILINOGEN UR QL STRIP.AUTO: 2 EU/DL (ref 0.1–1)
WBC # BLD AUTO: 16.2 K/UL (ref 4.1–11.1)
WBC URNS QL MICRO: ABNORMAL /HPF (ref 0–4)

## 2024-03-21 PROCEDURE — 6360000002 HC RX W HCPCS: Performed by: STUDENT IN AN ORGANIZED HEALTH CARE EDUCATION/TRAINING PROGRAM

## 2024-03-21 PROCEDURE — 99283 EMERGENCY DEPT VISIT LOW MDM: CPT

## 2024-03-21 PROCEDURE — 81001 URINALYSIS AUTO W/SCOPE: CPT

## 2024-03-21 PROCEDURE — 2580000003 HC RX 258: Performed by: STUDENT IN AN ORGANIZED HEALTH CARE EDUCATION/TRAINING PROGRAM

## 2024-03-21 PROCEDURE — 85025 COMPLETE CBC W/AUTO DIFF WBC: CPT

## 2024-03-21 PROCEDURE — 6360000002 HC RX W HCPCS

## 2024-03-21 PROCEDURE — 87636 SARSCOV2 & INF A&B AMP PRB: CPT

## 2024-03-21 PROCEDURE — 80053 COMPREHEN METABOLIC PANEL: CPT

## 2024-03-21 PROCEDURE — 36415 COLL VENOUS BLD VENIPUNCTURE: CPT

## 2024-03-21 PROCEDURE — 82077 ASSAY SPEC XCP UR&BREATH IA: CPT

## 2024-03-21 PROCEDURE — 80307 DRUG TEST PRSMV CHEM ANLYZR: CPT

## 2024-03-21 RX ORDER — ZIPRASIDONE MESYLATE 20 MG/ML
INJECTION, POWDER, LYOPHILIZED, FOR SOLUTION INTRAMUSCULAR
Status: COMPLETED
Start: 2024-03-21 | End: 2024-03-21

## 2024-03-21 RX ADMIN — ZIPRASIDONE MESYLATE: 20 INJECTION, POWDER, LYOPHILIZED, FOR SOLUTION INTRAMUSCULAR at 23:00

## 2024-03-21 ASSESSMENT — LIFESTYLE VARIABLES
HOW OFTEN DO YOU HAVE A DRINK CONTAINING ALCOHOL: 2-4 TIMES A MONTH
HOW MANY STANDARD DRINKS CONTAINING ALCOHOL DO YOU HAVE ON A TYPICAL DAY: 1 OR 2

## 2024-03-21 NOTE — ED NOTES
Assumed care of patient at this time from Mare HANKINS. Patient currently resting with eyes closed in ER stretcher, equal chest rise and fall. No signs of distress noted. Forensic restraint applied to right wrist, no redness noted.1:1 sitter and [place] PD remain at bedside.

## 2024-03-21 NOTE — ED NOTES
Pt resting with eyes closed, airway patent, breathing is adequate with equal chest rise and fall, NAD noted. Pt's right wrist hand cuffed to the bed and a urinal within reach. Officer at the door

## 2024-03-21 NOTE — ED PROVIDER NOTES
GEODON  Take 1 capsule by mouth 2 times daily (with meals)                DISCONTINUED MEDICATIONS:  Discharge Medication List as of 3/22/2024  1:30 AM          I am the Primary Clinician of Record. Mike Mcadams MD (electronically signed)    (Please note that parts of this dictation were completed with voice recognition software. Quite often unanticipated grammatical, syntax, homophones, and other interpretive errors are inadvertently transcribed by the computer software. Please disregards these errors. Please excuse any errors that have escaped final proofreading.)     Mike Mcadams MD  03/22/24 0802

## 2024-03-21 NOTE — BSMART NOTE
[facility], states Oksana is prescreening pt and should be here at the hosp.  Police officers state that she is in the parking lot.

## 2024-03-21 NOTE — ED TRIAGE NOTES
[facility]CS 15 pt is escorted her by ruth ann ESPINAL on a paperless ECO by his ; Hx bipolar disorder and schizophrenia

## 2024-03-21 NOTE — ED NOTES
Blood draw obtained by ED tech Taylor. Pt's right wrist handcuffed to the bed. Pt calm and cooperative, but talking nonstop to the tech--not making sense and repetitive.

## 2024-03-22 VITALS
OXYGEN SATURATION: 98 % | SYSTOLIC BLOOD PRESSURE: 129 MMHG | RESPIRATION RATE: 18 BRPM | BODY MASS INDEX: 27.7 KG/M2 | HEART RATE: 88 BPM | DIASTOLIC BLOOD PRESSURE: 87 MMHG | WEIGHT: 209 LBS | HEIGHT: 73 IN | TEMPERATURE: 98.2 F

## 2024-03-22 NOTE — ED NOTES
Attempted to get patient changed into green gown at this time. Right handcuff removed to promote independence. Unsuccessful. Patient began to yell at RN and PD. Refusing to change from personal clothes. Dr Garduno and Charge RN at bedside. Verbal order of 20mg IM Geodon. Patient took in R deltoid. Recuffed right hand by PD.

## 2024-03-22 NOTE — ED NOTES
EMTALA complete at this time. ED summary, results, notes, face sheet, and original/copy of EMTALA in yellow envelope. Handed to [place] officer. Officer currently waiting on second to arrive for transport.

## 2024-03-22 NOTE — ED NOTES
Verbal report called to [place] at this time to Bebo BARKLEY RN at this time. All questions answered. Notified that within the next 25 minutes patient will be arriving to facility. This RN filling out EMTALA at this time.

## 2024-03-22 NOTE — ED NOTES
Patient walking out of ER in street clothes. All belongings accounted for and on person. Accompanied by 2 [place] PD officers. No signs of distress noted. On way to [facility].

## 2024-03-22 NOTE — ED NOTES
This RN spoke with patient access at this time. States that they can not medically clear the patient due to the WBC count coming back at 16.2. Reports that they now need a UA.    Patient still un compliant at this time with providing urine specimen. Offered water in hopes to gain specimen, however, have been unsuccessful thus far.     Notified patient access about the ongoing issue. Stated that this RN will continue to promote urination.

## 2024-03-22 NOTE — ED NOTES
[facility] called with accepting facility at this time. Dr. Gagan Stahl. # for report [phone]. Waiting on TDO.

## 2024-05-20 ENCOUNTER — HOSPITAL ENCOUNTER (INPATIENT)
Facility: HOSPITAL | Age: 39
LOS: 18 days | Discharge: HOME | DRG: 750 | End: 2024-06-07
Attending: EMERGENCY MEDICINE | Admitting: PSYCHIATRY & NEUROLOGY
Payer: MEDICAID

## 2024-05-20 DIAGNOSIS — F23 ACUTE PSYCHOSIS (HCC): Primary | ICD-10-CM

## 2024-05-20 PROBLEM — F25.9 SCHIZOAFFECTIVE DISORDER (HCC): Status: ACTIVE | Noted: 2024-05-20

## 2024-05-20 LAB
AMPHET UR QL SCN: NEGATIVE
ANION GAP SERPL CALC-SCNC: 7 MMOL/L (ref 5–15)
APPEARANCE UR: ABNORMAL
BACTERIA URNS QL MICRO: NEGATIVE /HPF
BARBITURATES UR QL SCN: NEGATIVE
BASOPHILS # BLD: 0.1 K/UL (ref 0–0.1)
BASOPHILS NFR BLD: 1 % (ref 0–1)
BENZODIAZ UR QL: NEGATIVE
BILIRUB UR QL: NEGATIVE
BUN SERPL-MCNC: 11 MG/DL (ref 6–20)
BUN/CREAT SERPL: 8 (ref 12–20)
CA-I BLD-MCNC: 10.4 MG/DL (ref 8.5–10.1)
CANNABINOIDS UR QL SCN: NEGATIVE
CHLORIDE SERPL-SCNC: 103 MMOL/L (ref 97–108)
CO2 SERPL-SCNC: 25 MMOL/L (ref 21–32)
COCAINE UR QL SCN: NEGATIVE
COLOR UR: YELLOW
CREAT SERPL-MCNC: 1.36 MG/DL (ref 0.7–1.3)
DIFFERENTIAL METHOD BLD: ABNORMAL
EOSINOPHIL # BLD: 0 K/UL (ref 0–0.4)
EOSINOPHIL NFR BLD: 0 % (ref 0–7)
EPITH CASTS URNS QL MICRO: ABNORMAL /LPF
ERYTHROCYTE [DISTWIDTH] IN BLOOD BY AUTOMATED COUNT: 12.3 % (ref 11.5–14.5)
ETHANOL SERPL-MCNC: <10 MG/DL (ref 0–0.08)
GLUCOSE SERPL-MCNC: 120 MG/DL (ref 65–100)
GLUCOSE UR STRIP.AUTO-MCNC: NEGATIVE MG/DL
HCT VFR BLD AUTO: 46.3 % (ref 36.6–50.3)
HGB BLD-MCNC: 15.9 G/DL (ref 12.1–17)
HGB UR QL STRIP: NEGATIVE
IMM GRANULOCYTES # BLD AUTO: 0 K/UL (ref 0–0.04)
IMM GRANULOCYTES NFR BLD AUTO: 0 % (ref 0–0.5)
KETONES UR QL STRIP.AUTO: 5 MG/DL
LEUKOCYTE ESTERASE UR QL STRIP.AUTO: NEGATIVE
LYMPHOCYTES # BLD: 1.4 K/UL (ref 0.8–3.5)
LYMPHOCYTES NFR BLD: 12 % (ref 12–49)
Lab: NORMAL
MCH RBC QN AUTO: 30.8 PG (ref 26–34)
MCHC RBC AUTO-ENTMCNC: 34.3 G/DL (ref 30–36.5)
MCV RBC AUTO: 89.6 FL (ref 80–99)
METHADONE UR QL: NEGATIVE
MONOCYTES # BLD: 0.6 K/UL (ref 0–1)
MONOCYTES NFR BLD: 5 % (ref 5–13)
MUCOUS THREADS URNS QL MICRO: ABNORMAL /LPF
NEUTS SEG # BLD: 9.5 K/UL (ref 1.8–8)
NEUTS SEG NFR BLD: 82 % (ref 32–75)
NITRITE UR QL STRIP.AUTO: NEGATIVE
NRBC # BLD: 0 K/UL (ref 0–0.01)
NRBC BLD-RTO: 0 PER 100 WBC
OPIATES UR QL: NEGATIVE
PCP UR QL: NEGATIVE
PH UR STRIP: 5 (ref 5–8)
PLATELET # BLD AUTO: 374 K/UL (ref 150–400)
PMV BLD AUTO: 9.5 FL (ref 8.9–12.9)
POTASSIUM SERPL-SCNC: 3.7 MMOL/L (ref 3.5–5.1)
PROT UR STRIP-MCNC: 30 MG/DL
RBC # BLD AUTO: 5.17 M/UL (ref 4.1–5.7)
RBC #/AREA URNS HPF: ABNORMAL /HPF (ref 0–5)
SODIUM SERPL-SCNC: 135 MMOL/L (ref 136–145)
UROBILINOGEN UR QL STRIP.AUTO: 4 EU/DL (ref 0.1–1)
WBC # BLD AUTO: 11.6 K/UL (ref 4.1–11.1)
WBC URNS QL MICRO: ABNORMAL /HPF (ref 0–4)

## 2024-05-20 PROCEDURE — 6370000000 HC RX 637 (ALT 250 FOR IP): Performed by: PSYCHIATRY & NEUROLOGY

## 2024-05-20 PROCEDURE — 90791 PSYCH DIAGNOSTIC EVALUATION: CPT

## 2024-05-20 PROCEDURE — 80048 BASIC METABOLIC PNL TOTAL CA: CPT

## 2024-05-20 PROCEDURE — 80307 DRUG TEST PRSMV CHEM ANLYZR: CPT

## 2024-05-20 PROCEDURE — 82077 ASSAY SPEC XCP UR&BREATH IA: CPT

## 2024-05-20 PROCEDURE — 36415 COLL VENOUS BLD VENIPUNCTURE: CPT

## 2024-05-20 PROCEDURE — 6370000000 HC RX 637 (ALT 250 FOR IP): Performed by: EMERGENCY MEDICINE

## 2024-05-20 PROCEDURE — 99285 EMERGENCY DEPT VISIT HI MDM: CPT

## 2024-05-20 PROCEDURE — 1240000000 HC EMOTIONAL WELLNESS R&B

## 2024-05-20 PROCEDURE — 81001 URINALYSIS AUTO W/SCOPE: CPT

## 2024-05-20 PROCEDURE — 85025 COMPLETE CBC W/AUTO DIFF WBC: CPT

## 2024-05-20 RX ORDER — HYDROXYZINE 50 MG/1
50 TABLET, FILM COATED ORAL 3 TIMES DAILY PRN
Status: DISCONTINUED | OUTPATIENT
Start: 2024-05-20 | End: 2024-06-07 | Stop reason: HOSPADM

## 2024-05-20 RX ORDER — DIAZEPAM 5 MG/1
5 TABLET ORAL ONCE
Status: COMPLETED | OUTPATIENT
Start: 2024-05-20 | End: 2024-05-20

## 2024-05-20 RX ORDER — TRAZODONE HYDROCHLORIDE 50 MG/1
50 TABLET ORAL NIGHTLY PRN
Status: DISCONTINUED | OUTPATIENT
Start: 2024-05-20 | End: 2024-06-07 | Stop reason: HOSPADM

## 2024-05-20 RX ORDER — VALPROIC ACID 250 MG/5ML
500 SOLUTION ORAL 2 TIMES DAILY
Status: DISCONTINUED | OUTPATIENT
Start: 2024-05-20 | End: 2024-06-07 | Stop reason: HOSPADM

## 2024-05-20 RX ORDER — ZIPRASIDONE HYDROCHLORIDE 20 MG/1
20 CAPSULE ORAL 2 TIMES DAILY WITH MEALS
Status: DISCONTINUED | OUTPATIENT
Start: 2024-05-21 | End: 2024-05-21

## 2024-05-20 RX ORDER — ZIPRASIDONE MESYLATE 20 MG/ML
20 INJECTION, POWDER, LYOPHILIZED, FOR SOLUTION INTRAMUSCULAR EVERY 12 HOURS PRN
Status: DISCONTINUED | OUTPATIENT
Start: 2024-05-20 | End: 2024-06-07 | Stop reason: HOSPADM

## 2024-05-20 RX ORDER — VALPROIC ACID 250 MG/1
500 CAPSULE, LIQUID FILLED ORAL 2 TIMES DAILY
Status: ON HOLD | COMMUNITY
End: 2024-06-07 | Stop reason: HOSPADM

## 2024-05-20 RX ORDER — ZIPRASIDONE HYDROCHLORIDE 20 MG/1
20 CAPSULE ORAL EVERY 12 HOURS PRN
Status: DISCONTINUED | OUTPATIENT
Start: 2024-05-20 | End: 2024-06-07 | Stop reason: HOSPADM

## 2024-05-20 RX ORDER — ACETAMINOPHEN 325 MG/1
650 TABLET ORAL EVERY 4 HOURS PRN
Status: DISCONTINUED | OUTPATIENT
Start: 2024-05-20 | End: 2024-06-07 | Stop reason: HOSPADM

## 2024-05-20 RX ADMIN — VALPROIC ACID 500 MG: 250 SOLUTION ORAL at 23:37

## 2024-05-20 RX ADMIN — DIAZEPAM 5 MG: 5 TABLET ORAL at 13:45

## 2024-05-20 RX ADMIN — ZIPRASIDONE HYDROCHLORIDE 20 MG: 20 CAPSULE ORAL at 23:37

## 2024-05-20 ASSESSMENT — PAIN - FUNCTIONAL ASSESSMENT
PAIN_FUNCTIONAL_ASSESSMENT: 0-10
PAIN_FUNCTIONAL_ASSESSMENT: 0-10

## 2024-05-20 ASSESSMENT — SLEEP AND FATIGUE QUESTIONNAIRES
DO YOU HAVE DIFFICULTY SLEEPING: YES
SLEEP PATTERN: DIFFICULTY FALLING ASLEEP
AVERAGE NUMBER OF SLEEP HOURS: 4
DO YOU USE A SLEEP AID: NO

## 2024-05-20 ASSESSMENT — PAIN SCALES - GENERAL
PAINLEVEL_OUTOF10: 0
PAINLEVEL_OUTOF10: 0

## 2024-05-20 ASSESSMENT — LIFESTYLE VARIABLES
HOW OFTEN DO YOU HAVE A DRINK CONTAINING ALCOHOL: NEVER
HOW MANY STANDARD DRINKS CONTAINING ALCOHOL DO YOU HAVE ON A TYPICAL DAY: PATIENT DOES NOT DRINK

## 2024-05-20 NOTE — ED NOTES
Pt asked if he has been eating and sleeping. Pt unsure. Pt provided lunch box with cutlery removed.

## 2024-05-20 NOTE — ED PROVIDER NOTES
[facility] EMERGENCY DEPT  EMERGENCY DEPARTMENT HISTORY AND PHYSICAL EXAM      Date: 5/20/2024  Patient Name: Art Rainey  MRN: [id number]  Birthdate 1985  Date of evaluation: 5/20/2024  Provider: Farhan Soriano MD   Note Started: 2:11 PM EDT 5/20/24    HISTORY OF PRESENT ILLNESS     Chief Complaint   Patient presents with    Mental Health Problem       History Provided By: Patient    HPI: Art Rainey is a 39 y.o. male presents for evaluation of of acute psychosis.  Family states they took the patient to the  but then dropped him off here because he stopped taking his psych medicines.  Patient does report anxiety.    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    Family History:  No family history on file.    Social History:  Social History     Tobacco Use    Smoking status: Former     Current packs/day: 0.50     Types: Cigarettes    Smokeless tobacco: Never   Substance Use Topics    Alcohol use: Not Currently    Drug use: Not Currently     Types: Marijuana (Weed)       Allergies:  No Known Allergies    PCP: No primary care provider on file.    Current Meds:   Current Facility-Administered Medications   Medication Dose Route Frequency Provider Last Rate Last Admin    ziprasidone (GEODON) capsule 20 mg  20 mg Oral Daily David Cabrera MD   20 mg at 05/21/24 0852    clonazePAM (KLONOPIN) tablet 1 mg  1 mg Oral Q8H PRN David Cabrera MD        ziprasidone (GEODON) capsule 20 mg  20 mg Oral Q12H PRN David Cabrera MD   20 mg at 05/21/24 1749    ziprasidone (GEODON) injection 20 mg  20 mg IntraMUSCular Q12H PRN David Cabrera MD        acetaminophen (TYLENOL) tablet 650 mg  650 mg Oral Q4H

## 2024-05-20 NOTE — BSMART NOTE
Attempted to assess pt.  Pt has 1:1 at the door.  Pt pacing around room in green gown, mumbling to himself, and no eye contact.  Pt sat on the stretcher.  Pt denies SI HI.  Writer asked pt for name and , pt states\"I am the real and everything\"  Pt keeps mumbling \"Im sorry\".  Writer asked pt about past medical h/o and pt states \"already did that\".  Pt does not have capacity at this time and answers assessment questions innappr.  D 19, Oksana called for prescreen.  Awaiting for return call from D 19 for prescreen.      BSMART assessment completed, and suicide risk level noted to be no Primary Nurse Zenon  and Physician Bo notified. Concerns not observed.     This writer reviewed the [place] Suicide Severity Rating Scale in nursing flowsheet and the risk level assigned is high risk.  Based on this assessment, the risk of suicide is no risk and the plan is D 19 to prescreen.

## 2024-05-20 NOTE — ED NOTES
Pt presenting as though he is scared to be seen. Pt states that he has had SI but nurse is unable to understand his explanation. Pt states he is not taking his meds because he is smarter than everybody else. Pt states that he isn't feeling well and that it is his own fault that he is here. Nurse asked if he means that he is hurting himself. Pt responds but nurse is unable to understand pt's response.     Pt cooperative with changing into green gown, lab draw, and urine sample. In the process, pt shows signs of wanting to leave. Nurse assured pt that we are here to help him feel better and that we want him to stay so that we can help.    Pt's room is psych safe with sitter at the door.

## 2024-05-21 PROCEDURE — 6370000000 HC RX 637 (ALT 250 FOR IP): Performed by: PSYCHIATRY & NEUROLOGY

## 2024-05-21 PROCEDURE — 1240000000 HC EMOTIONAL WELLNESS R&B

## 2024-05-21 RX ORDER — CLONAZEPAM 1 MG/1
1 TABLET ORAL EVERY 8 HOURS PRN
Status: DISCONTINUED | OUTPATIENT
Start: 2024-05-21 | End: 2024-06-07 | Stop reason: HOSPADM

## 2024-05-21 RX ORDER — ZIPRASIDONE HYDROCHLORIDE 20 MG/1
20 CAPSULE ORAL DAILY
Status: DISCONTINUED | OUTPATIENT
Start: 2024-05-21 | End: 2024-05-24

## 2024-05-21 RX ADMIN — VALPROIC ACID 500 MG: 250 SOLUTION ORAL at 08:52

## 2024-05-21 RX ADMIN — CLONAZEPAM 1 MG: 1 TABLET ORAL at 21:32

## 2024-05-21 RX ADMIN — ZIPRASIDONE HYDROCHLORIDE 20 MG: 20 CAPSULE ORAL at 08:52

## 2024-05-21 RX ADMIN — ZIPRASIDONE HYDROCHLORIDE 20 MG: 20 CAPSULE ORAL at 17:49

## 2024-05-21 RX ADMIN — VALPROIC ACID 500 MG: 250 SOLUTION ORAL at 20:41

## 2024-05-21 RX ADMIN — TRAZODONE HYDROCHLORIDE 50 MG: 50 TABLET ORAL at 21:32

## 2024-05-21 RX ADMIN — HYDROXYZINE HYDROCHLORIDE 50 MG: 50 TABLET, FILM COATED ORAL at 20:00

## 2024-05-21 RX ADMIN — HYDROXYZINE HYDROCHLORIDE 50 MG: 50 TABLET, FILM COATED ORAL at 06:53

## 2024-05-21 RX ADMIN — TRAZODONE HYDROCHLORIDE 50 MG: 50 TABLET ORAL at 02:27

## 2024-05-21 NOTE — ED NOTES
Writer called report to [facility] on [address]. Writer informed [facility] that pt cannot go up until TDO is served by [facility].

## 2024-05-21 NOTE — GROUP NOTE
Group Therapy Note    Date: 5/21/2024    Group Start Time: 1200  Group End Time: 1230  Group Topic: Process Group - Inpatient    SSR 2 BEHA [facility] ACUTE    Liana Dwyer        Group Therapy Note: Due to presence of acuity each individual was provided with a handout on \"how I feel\" in order to process their emotions.    Attendees: 8       Patient's Goal:  to attend groups    Notes:  Pt was provided with a handout    Signature:  Liana Dwyer

## 2024-05-21 NOTE — BSMART NOTE
Patient accepted by Dr. Cabrera to bed 242-1. Patient should not be transported to 2 south until TDO is served. Patient's nurse, chapito Pineda.

## 2024-05-21 NOTE — CONSULTS
review of systems because:   The patient is intubated and sedated    The patient has altered mental status due to his acute medical problems    The patient has baseline aphasia from prior stroke(s)    The patient has baseline dementia and is not reliable historian                 POSITIVE= underlined text  Negative = text not underlined  General:  fever, chills, sweats, generalized weakness, weight loss/gain,      loss of appetite   Eyes:    blurred vision, eye pain, loss of vision, double vision  ENT:    rhinorrhea, pharyngitis   Respiratory:   cough, sputum production, SOB, wheezing, ROSS, pleuritic pain   Cardiology:   chest pain, palpitations, orthopnea, PND, edema, syncope   Gastrointestinal:  abdominal pain , N/V, dysphagia, diarrhea, constipation, bleeding   Genitourinary:  frequency, urgency, dysuria, hematuria, incontinence   Muskuloskeletal :  arthralgia, myalgia   Hematology:  easy bruising, nose or gum bleeding, lymphadenopathy   Dermatological: rash, ulceration, pruritis   Endocrine:   hot flashes or polydipsia   Neurological:  headache, dizziness, confusion, focal weakness, paresthesia,     Speech difficulties, memory loss, gait disturbance  Psychological: Feelings of anxiety, depression, agitation    Objective:   VITALS:    BP (!) 129/93   Pulse (!) 109   Temp 97.9 °F (36.6 °C) (Oral)   Resp 18   Ht 1.854 m (6' 1\")   Wt 88.9 kg (196 lb)   SpO2 100%   BMI 25.86 kg/m²   Temp (24hrs), Av.7 °F (37.1 °C), Min:97.9 °F (36.6 °C), Max:99.1 °F (37.3 °C)      PHYSICAL EXAM:   General:    Alert, cooperative, no distress, appears stated age.     HEENT: Atraumatic, anicteric sclerae, pink conjunctivae     No oral ulcers, mucosa moist, throat clear  Neck:  Supple, symmetrical,  thyroid: non tender  Lungs:   Clear to auscultation bilaterally.  No Wheezing or Rhonchi. No rales.  Chest wall:  No tenderness  No Accessory muscle use.  Heart:   Regular  rhythm,  No  murmur   No edema  Abdomen:   Soft,

## 2024-05-21 NOTE — GROUP NOTE
Group Therapy Note    Date: 5/21/2024    Group Start Time: 1515  Group End Time: 1545  Group Topic: Group Therapy    SSR 2 BEHA [facility] ACUTE    Emma Burdick        Group Therapy Note    Attendees: 7       Patient's Goal:      Notes:  Patient did not attend group.    Status After Intervention:      Participation Level:     Participation Quality:       Speech:        Thought Process/Content:       Affective Functioning:       Mood:       Level of consciousness:        Response to Learning:       Endings:     Modes of Intervention:       Discipline Responsible:       Signature:  Emma Burdick

## 2024-05-21 NOTE — CARE COORDINATION
05/21/24 1248   ITP   Date of Plan 05/21/24   Date of Next Review 05/28/24   Primary Diagnosis Code Schizoaffective disorder (HCC) F25.9   Barriers to Treatment Need for psychoeducation   Strengths Incorporated in Plan Acknowledging need for assistance   Plan of Care   Long Term Goal (LTG) Stated in patient/guardian terms accomplish feeling better   Short Term Goal 1   Short Term Goal 1 Client will be oriented to program and staff, and participate in assessment process   Baseline Functioning to make the individual comfortable with the environment while in the hospital   Target the patient will approach staff and voice appropriate needs   Objectives Client will participate in individual therapy;Client will participate in group therapy   Intervention 1 Assess safety   Frequency daily   Measured by Self report;Staff observation   Staff Responsible Clinical staff;[facility] staff   Intervention 2 Acknowledge client strengths   Frequency daily   Measured by Staff observation;Self report   Staff Responsible Clinical staff;[facility] staff   Intervention 3 Group therapy   Frequency daily   Measured by Self report;Staff observation   Staff Responsible Clinical staff;[facility] staff   STG Goal 1 Status: Patient Appears to be  Progressing toward treatment plan goal   Short Term Goal 2   Short Term Goal 2 Client will learn and demonstrate effective coping skills   Baseline Functioning to improve the over all quality of life   Target the individual will be able to use positive skills to deal with life stressors   Intervention 1 Indvidual therapy   Frequency daily   Measured by Self report;Staff observation   Staff Responsible Clinical staff;[facility] staff   Intervention 2 Milieu therapy and support   Frequency daily   Measured by Self report;Staff observation   Staff Responsible Clinical staff;[facility] staff   Intervention 3 Monitor medications   Frequency daily   Measured by Staff observation;Self report   Staff Responsible Clinical staff;[facility] staff

## 2024-05-22 PROCEDURE — 1240000000 HC EMOTIONAL WELLNESS R&B

## 2024-05-22 PROCEDURE — 6370000000 HC RX 637 (ALT 250 FOR IP): Performed by: PSYCHIATRY & NEUROLOGY

## 2024-05-22 RX ADMIN — VALPROIC ACID 500 MG: 250 SOLUTION ORAL at 21:22

## 2024-05-22 RX ADMIN — VALPROIC ACID 500 MG: 250 SOLUTION ORAL at 08:22

## 2024-05-22 NOTE — GROUP NOTE
Group Therapy Note    Date: 5/21/2024    Group Start Time: 1945  Group End Time: 2030  Group Topic: Recreational    SSR 2 BH NON ACUTE    Joanie Fritz        Group Therapy Note    Attendees: 5/7     Recreational Therapist facilitated structured leisure skills group to introduce healthy leisure skills as positive way to cope and manage mood.        Patient's Goal:  Client will learn and demonstrate effective coping skills     Notes:  Walked into group and sat for a few minutes. Did not verbally respond to staff nor did he engage in group activity. Pt left group and did not return after a few minutes in session.    Status After Intervention:  Unchanged    Participation Level: Minimal    Participation Quality: Resistant      Speech:  hesitant      Thought Process/Content: guarded      Affective Functioning: Flat      Mood:  quiet      Level of consciousness:  Preoccupied      Response to Learning: Progressing to goal      Endings: None Reported    Modes of Intervention: Activity      Discipline Responsible: Recreational Therapist      Signature:  LIEN Chavarria

## 2024-05-22 NOTE — GROUP NOTE
Group Therapy Note    Date: 5/22/2024    Group Start Time: 1030  Group End Time: 1100  Group Topic: Medication    SSR 2 BH NON ACUTE    Bia Aceves RN        Group Therapy Note    Attendees: 3           Patient's Goal:  Verbalize importance of taking medication after discharge.      Notes:  Attended, but left early    Status After Intervention:  Unchanged    Participation Level: Minimal    Participation Quality: Resistant      Speech:  normal      Thought Process/Content: Perseverating      Affective Functioning: Constricted/Restricted      Mood: depressed      Level of consciousness:  Alert      Response to Learning: Resistant      Endings: None Reported    Modes of Intervention: Education      Discipline Responsible: Registered Nurse      Signature:  Bia Aceves RN

## 2024-05-22 NOTE — H&P
[address]  [address]                                PSYCH H&P      PATIENT NAME: HEMA MORALES          : 1985  MED REC NO: [id number]                       ROOM: 242  ACCOUNT NO: [id number]                       ADMIT DATE: 2024  PROVIDER: David Cabrera MD    DATE:  2024    HISTORY OF PRESENT ILLNESS:  This is a 39-year-old single  male patient admitted to Behavioral Health Unit under TDO from [facility] ED, depression, bizarre behavior, taking clothes off, and feels he did something wrong, tried to do good, hard on himself.  He was not taking medications.  He has a history of schizophrenia, schizoaffective disorder.  Followed up with D19, not compliant with the medications, rambling, poor eye contact, restless, poor sleep, poor appetite.  Blaming himself.  Blocking, rambling, disorganized, poor judgment, poor insight.    TRAUMA HISTORY:  Unknown.    SUBSTANCE ABUSE HISTORY:  Smoking half a pack of cigarettes.  Drug screen was negative.  Ethanol negative.    ALLERGIES TO MEDICATIONS:none    MEDICATIONS:  He was supposed to be taking Depakote and Abilify not taking.    MENTAL STATUS EXAMINATION:  Tall, medium-built gentleman.  Alert, verbal, polite.  Poor eye contact.  Disorganized, rambling, very hard to understand, blocking.  Says good things done.  Bad things, he did not elaborate on it.  Denies suicidal thoughts or homicidal thoughts.  Poor insight.  Poor judgment.    LABORATORY DATA:  Urinalysis unremarkable.  Another urinalysis shows turbid, protein 30, and ketones 5.  Urine drug screen negative.  Ethanol level less than 10.  BMP; sodium 135, glucose 120, creatinine 1.36, estimated GFR 67, calcium 10.4.  CBC; WBC 11.6, neutrophils 82.    VITAL SIGNS:  Pulse 114, blood pressure 136/93, temperature 100, respirations 18, and SpO2 of 100% at room air.    The patient was seen by

## 2024-05-22 NOTE — GROUP NOTE
Group Therapy Note    Date: 5/22/2024    Group Start Time: 1315  Group End Time: 1400  Group Topic: Process Group - Inpatient    SSR 2 BEHA [facility] ACUTE    Delfina Kelly MSW        Group Therapy Note: Facilitator encouraged the group to share emotions and discussed ways to manage stressful emotions in a healthy way.     Attendees: 3     Notes:  Pt did not attend group      Signature:  FLOR VALLES

## 2024-05-23 PROCEDURE — 6370000000 HC RX 637 (ALT 250 FOR IP): Performed by: PSYCHIATRY & NEUROLOGY

## 2024-05-23 PROCEDURE — 1240000000 HC EMOTIONAL WELLNESS R&B

## 2024-05-23 RX ADMIN — HYDROXYZINE HYDROCHLORIDE 50 MG: 50 TABLET, FILM COATED ORAL at 18:29

## 2024-05-23 RX ADMIN — ZIPRASIDONE HYDROCHLORIDE 20 MG: 20 CAPSULE ORAL at 08:02

## 2024-05-23 RX ADMIN — VALPROIC ACID 500 MG: 250 SOLUTION ORAL at 08:02

## 2024-05-23 RX ADMIN — CLONAZEPAM 1 MG: 1 TABLET ORAL at 08:35

## 2024-05-23 RX ADMIN — TRAZODONE HYDROCHLORIDE 50 MG: 50 TABLET ORAL at 00:53

## 2024-05-23 RX ADMIN — ZIPRASIDONE HYDROCHLORIDE 20 MG: 20 CAPSULE ORAL at 18:29

## 2024-05-23 RX ADMIN — CLONAZEPAM 1 MG: 1 TABLET ORAL at 00:53

## 2024-05-23 ASSESSMENT — PAIN SCALES - GENERAL: PAINLEVEL_OUTOF10: 0

## 2024-05-23 NOTE — GROUP NOTE
Group Therapy Note    Date: 5/23/2024    Group Start Time: 1525  Group End Time: 1620  Group Topic: Recreational    SSR 2 BH NON ACUTE    Karen Lewis        Group Therapy Note    Facilitated leisure skills group to reinforce positive coping and to manage mood through music, social interaction, group activities and art task       Attendees: 6/8       Patient's Goal:  Client will learn and demonstrate effective coping skills    Notes:  Sat quiet in group while listening to music. When prompted pt would stare and would respond in a low tone.     Status After Intervention:  Unchanged    Participation Level: Active Listener    Participation Quality: Resistant       Speech:  low and soft spoken      Thought Process/Content: Guarded      Affective Functioning: Flat      Mood:  Calm      Level of consciousness:  Alert and Preoccupied      Response to Learning: Progressing to goal      Endings: None Reported    Modes of Intervention: Socialization and Activity      Discipline Responsible: Recreational Therapist      Signature:  LIEN Wright

## 2024-05-23 NOTE — GROUP NOTE
Group Therapy Note    Date: 5/23/2024    Group Start Time: 1115  Group End Time: 1150  Group Topic: Education Group - Inpatient    SSR 2  NON ACUTE    Karen Lewis        Group Therapy Note    Facilitated discussion focused on defining and sharing examples of different types of automatic negative thoughts and how they affect moods and behaviors       Attendees: 4/6       Patient's Goal:  Client will learn and demonstrate effective coping skills    Notes:  Pt attended the entire group and only engaged when prompted    Status After Intervention:  Unchanged    Participation Level: Minimal    Participation Quality: Disorganized      Speech:  soft spoken      Thought Process/Content: Poor insight and judgement      Affective Functioning: Flat      Mood:  Calm      Level of consciousness:  Alert and Preoccupied      Response to Learning: Progressing to goal      Endings: None Reported    Modes of Intervention: Education and Support      Discipline Responsible: Recreational Therapist      Signature:  LIEN Wright

## 2024-05-23 NOTE — GROUP NOTE
Group Therapy Note    Date: 5/22/2024    Group Start Time: 1945  Group End Time: 2030  Group Topic: Recreational    SSR 2 BH NON ACUTE    Joanie Fritz        Group Therapy Note    Attendees: 2/6    Recreational Therapist facilitated structured leisure skills group to introduce healthy leisure skills as positive way to cope and manage mood.               Notes:  Did not attend group despite encouragement    Discipline Responsible: Recreational Therapist      Signature:  LIEN Chavarria

## 2024-05-23 NOTE — GROUP NOTE
Group Therapy Note    Date: 5/23/2024    Group Start Time: 0945  Group End Time: 1015  Group Topic: Relaxation    SSR 2 BEHA TH ACUTE    Ann Marie Billy LPN        Group Therapy Note    Attendees: 4       Patient's Goal:  Relaxation through puzzles    Notes:  active participant    Status After Intervention:  Unchanged    Participation Level: Interactive    Participation Quality: Supportive      Speech:  normal      Thought Process/Content: Logical      Affective Functioning: Congruent      Mood: anxious      Level of consciousness:  Alert      Response to Learning: Capable of insight      Endings: None Reported    Modes of Intervention: Support      Discipline Responsible: Licensed Practical Nurse      Signature:  Ann Marie Billy LPN

## 2024-05-23 NOTE — GROUP NOTE
Group Therapy Note    Date: 5/23/2024    Group Start Time: 0900  Group End Time: 0930  Group Topic: Community Meeting    SSR 2 BEHA [facility] ACUTE    Machelle Middleton        Group Therapy Note    Attendees: 5       Patient's Goal:      Participation Level: Minimal    Participation Quality: Appropriate and Sharing    Mood: feeling down     Discipline Responsible: Behavorial Health Tech      Signature:  Machelle Middleton

## 2024-05-24 PROCEDURE — 1240000000 HC EMOTIONAL WELLNESS R&B

## 2024-05-24 PROCEDURE — 6370000000 HC RX 637 (ALT 250 FOR IP): Performed by: PSYCHIATRY & NEUROLOGY

## 2024-05-24 RX ORDER — ZIPRASIDONE HYDROCHLORIDE 40 MG/1
40 CAPSULE ORAL 2 TIMES DAILY WITH MEALS
Status: DISCONTINUED | OUTPATIENT
Start: 2024-05-24 | End: 2024-05-27

## 2024-05-24 RX ORDER — ZIPRASIDONE HYDROCHLORIDE 40 MG/1
40 CAPSULE ORAL DAILY
Status: DISCONTINUED | OUTPATIENT
Start: 2024-05-25 | End: 2024-05-24

## 2024-05-24 RX ADMIN — ZIPRASIDONE HYDROCHLORIDE 40 MG: 40 CAPSULE ORAL at 17:05

## 2024-05-24 RX ADMIN — ZIPRASIDONE HYDROCHLORIDE 20 MG: 20 CAPSULE ORAL at 08:38

## 2024-05-24 RX ADMIN — VALPROIC ACID 500 MG: 250 SOLUTION ORAL at 08:39

## 2024-05-24 ASSESSMENT — PAIN SCALES - GENERAL: PAINLEVEL_OUTOF10: 0

## 2024-05-24 NOTE — GROUP NOTE
Group Therapy Note    Date: 5/24/2024    Group Start Time: 1602  Group End Time: 1622  Group Topic: Education Group - Inpatient    SSR 2 BEHA [facility]    Sho Li RN        Group Therapy Note    Attendees: 2/9       Patient's Goal:  Pt encouraged to attend group but chose not to attend.    Notes:  Pt encouraged to attend group but chose not to attend.    Status After Intervention:  Unchanged    Participation Level: None        Signature:  Sho Li RN

## 2024-05-24 NOTE — GROUP NOTE
Group Therapy Note    Date: 5/24/2024    Group Start Time: 1345  Group End Time: 1430  Group Topic: Recreational    SSR 2 BH NON ACUTE    Karen Lewis        Group Therapy Note    Facilitated leisure skills group to reinforce positive coping and to manage mood through music, social interaction, group activities and art task       Attendees: 4/9       Patient's Goal:  Client will learn and demonstrate effective coping skills    Notes:  Pt was receptive to listening to music and attempt to select a song but then stated \"to skip him\"  Interacted with staff when prompted. Declined to work on leisure task      Status After Intervention:  Unchanged    Participation Level: Active Listener    Participation Quality: Resistant      Speech:  normal      Thought Process/Content: Guarded      Affective Functioning: Flat      Mood:  Calm      Level of consciousness:  Alert and Preoccupied      Response to Learning: Progressing to goal      Endings: None Reported    Modes of Intervention: Socialization and Activity      Discipline Responsible: Recreational Therapist      Signature:  AIDA WrightS

## 2024-05-24 NOTE — GROUP NOTE
Group Therapy Note    Date: 5/24/2024    Group Start Time: 1115  Group End Time: 1145  Group Topic: Process Group - Inpatient    SSR 2 BEHA [facility] ACUTE    Delfina Kelly MSW        Group Therapy Note: Facilitator encouraged the group to identify emotions and ways to communicate them to have our needs met. The group discussed 4 ways to build \"personal freedom\": 1. Be impeccable with your word, 2. Don't take anything personally, 3. Don't make assumptions, and 4. Always do your best    Attendees: 4         Notes:  Pt did not attend group      Signature:  FLOR VALLES

## 2024-05-24 NOTE — GROUP NOTE
Group Therapy Note    Date: 5/24/2024    Group Start Time: 0945  Group End Time: 1015  Group Topic: Education Group - Inpatient    SSR 2  NON ACUTE    Karen Lewis        Group Therapy Note    Setting Goals/Facilitated discussion focused on “stepping up to a better you” by taking steps to improve in their well-being and identifying goals that would help to ensure follow-up       Attendees: 4/9       Notes:  Encouraged but did not attend    Discipline Responsible: Recreational Therapist      Signature:  LIEN Wright

## 2024-05-25 PROCEDURE — 1240000000 HC EMOTIONAL WELLNESS R&B

## 2024-05-25 PROCEDURE — 6370000000 HC RX 637 (ALT 250 FOR IP): Performed by: PSYCHIATRY & NEUROLOGY

## 2024-05-25 PROCEDURE — 6360000002 HC RX W HCPCS: Performed by: PSYCHIATRY & NEUROLOGY

## 2024-05-25 RX ORDER — HALOPERIDOL 5 MG/ML
5 INJECTION INTRAMUSCULAR 2 TIMES DAILY WITH MEALS
Status: DISCONTINUED | OUTPATIENT
Start: 2024-05-25 | End: 2024-05-27

## 2024-05-25 RX ADMIN — HALOPERIDOL LACTATE 5 MG: 5 INJECTION, SOLUTION INTRAMUSCULAR at 10:14

## 2024-05-25 RX ADMIN — ZIPRASIDONE HYDROCHLORIDE 40 MG: 40 CAPSULE ORAL at 18:17

## 2024-05-25 NOTE — GROUP NOTE
Group Therapy Note    Date: 5/25/2024    Group Start Time: 1045  Group End Time: 1130  Group Topic: Education Group - Inpatient    SSR 2  NON ACUTE    Joanie Fritz        Group Therapy Note    Attendees: 6/8    Facilitated structured group to identify triggers, impact of triggers on mental health, and positive ways to manage triggers.          Patient's Goal: Client will learn and demonstrate effective coping skills    Notes:  Initially attended group. Refused to give CTRS his name. PT stated CTRS did not need to know his name. He then asked a peer in group if she knew his name and then stated \"Things are not going according to plan\". Pt then left group and did not return session.     Status After Intervention:  Decompensated    Participation Level: Minimal    Participation Quality: Resistant      Speech:  hesitant      Thought Process/Content: Perseverating      Affective Functioning: Blunted      Mood:  calm       Level of consciousness:  Preoccupied      Response to Learning: Progressing to goal      Endings: None Reported    Modes of Intervention: Education and Support      Discipline Responsible: Recreational Therapist      Signature:  LIEN Chavarria

## 2024-05-25 NOTE — GROUP NOTE
3                                                                      Group Therapy Note    Date: 5/25/2024    Group Start Time: 1515  Group End Time: 1600  Group Topic: Recreational    SSR 2 [facility] NON ACUTE    Joanie Fritz        Group Therapy Note    Attendees: 5/8     Recreational Therapist facilitated structured leisure skills group to introduce healthy leisure skills as positive way to cope and manage mood.            Notes:  Did not attend group despite encouragement      Discipline Responsible: Recreational Therapist      Signature:  LIEN Chavarria

## 2024-05-26 PROCEDURE — 6370000000 HC RX 637 (ALT 250 FOR IP): Performed by: PSYCHIATRY & NEUROLOGY

## 2024-05-26 PROCEDURE — 1240000000 HC EMOTIONAL WELLNESS R&B

## 2024-05-26 RX ADMIN — VALPROIC ACID 500 MG: 250 SOLUTION ORAL at 08:52

## 2024-05-26 RX ADMIN — ZIPRASIDONE HYDROCHLORIDE 40 MG: 40 CAPSULE ORAL at 16:41

## 2024-05-26 RX ADMIN — ZIPRASIDONE HYDROCHLORIDE 40 MG: 40 CAPSULE ORAL at 08:52

## 2024-05-26 NOTE — GROUP NOTE
Group Therapy Note    Date: 5/26/2024    Group Start Time: 1005  Group End Time: 1030  Group Topic: Activity    SSR 2 [facility] ACUTE    Emma Burdick        Group Therapy Note    Attendees: 3       Patient's Goal:    Notes:  PATIENT DID NOT ATTEND GROUP. JINGO    Status After Intervention:      Participation Level:     Participation Quality:       Speech:        Thought Process/Content:       Affective Functioning:       Mood:       Level of consciousness:        Response to Learning:       Endings:     Modes of Intervention:       Discipline Responsible:       Signature:  Emma Burdick

## 2024-05-26 NOTE — GROUP NOTE
Group Therapy Note    Date: 5/26/2024    Group Start Time: 1515  Group End Time: 1600  Group Topic: Recreational    SSR 2 [facility] NON ACUTE    Joanie Fritz        Group Therapy Note    Attendees: 4/7    Recreational Therapist facilitated structured leisure skills group to introduce healthy leisure skills as positive way to cope and manage mood.         Patient's Goal:  Client will learn and demonstrate effective coping skills     Notes:   Attended group. Listened to songs played during the group session. Pt was cooperative and receptive to intervention.     Status After Intervention:  Improved    Participation Level: Active Listener    Participation Quality: Appropriate      Speech:  normal      Thought Process/Content: Logical      Affective Functioning: Congruent      Mood:  calm       Level of consciousness:  Alert      Response to Learning: Progressing to goal      Endings: None Reported    Modes of Intervention: Activity      Discipline Responsible: Recreational Therapist      Signature:  LIEN Chavarria

## 2024-05-26 NOTE — GROUP NOTE
Group Therapy Note    Date: 5/26/2024    Group Start Time: 1045  Group End Time: 1130  Group Topic: Education Group - Inpatient    SSR 2 [facility] NON ACUTE    Joanie Fritz        Group Therapy Note    Attendees: 4/7    Facilitated structured group discussion to identify protective factors that have been valuable and protective factors that could be improved in managing stressors.         Patient's Goal:  Client will learn and demonstrate effective coping skills     Notes:  Attended group. Accepted material from group discussion on \"Protective Factors\". Related in group \"I don't have any emotions... I must be the most emotionless person in the world\".  Pt sat through entire session. Did not offer any feedback when prompted by CTRS.    Status After Intervention:  Unchanged    Participation Level: Active Listener    Participation Quality: Attentive      Speech:  normal      Thought Process/Content: Perseverating      Affective Functioning: Blunted      Mood:  calm      Level of consciousness:  Preoccupied      Response to Learning: Progressing to goal      Endings: None Reported    Modes of Intervention: Education and Support      Discipline Responsible: Recreational Therapist      Signature:  LIEN Chavarria

## 2024-05-26 NOTE — GROUP NOTE
Group Therapy Note    Date: 5/26/2024    Group Start Time: 0943  Group End Time: 1005  Group Topic: Community Meeting    [facility]R 2 [facility] ACUTE    Emma Burdick        Group Therapy Note    Attendees:        Patient's Goal:      Notes:  PATIENT DID NOT ATTEND GROUP.    Status After Intervention:      Participation Level:     Participation Quality:       Speech:        Thought Process/Content:      Affective Functioning:       Mood:       Level of consciousness:        Response to Learning:       Endings:     Modes of Intervention:       Discipline Responsible:       Signature:  Emma Burdick

## 2024-05-27 PROCEDURE — 1240000000 HC EMOTIONAL WELLNESS R&B

## 2024-05-27 PROCEDURE — 6370000000 HC RX 637 (ALT 250 FOR IP): Performed by: PSYCHIATRY & NEUROLOGY

## 2024-05-27 RX ORDER — ZIPRASIDONE HYDROCHLORIDE 40 MG/1
40 CAPSULE ORAL 2 TIMES DAILY WITH MEALS
Status: DISCONTINUED | OUTPATIENT
Start: 2024-05-27 | End: 2024-05-30

## 2024-05-27 RX ORDER — HALOPERIDOL 5 MG/ML
5 INJECTION INTRAMUSCULAR 2 TIMES DAILY WITH MEALS
Status: DISCONTINUED | OUTPATIENT
Start: 2024-05-27 | End: 2024-05-30

## 2024-05-27 RX ADMIN — VALPROIC ACID 500 MG: 250 SOLUTION ORAL at 20:46

## 2024-05-27 RX ADMIN — VALPROIC ACID 500 MG: 250 SOLUTION ORAL at 08:58

## 2024-05-27 RX ADMIN — ZIPRASIDONE HYDROCHLORIDE 40 MG: 40 CAPSULE ORAL at 08:58

## 2024-05-27 RX ADMIN — ZIPRASIDONE HYDROCHLORIDE 40 MG: 40 CAPSULE ORAL at 17:09

## 2024-05-27 NOTE — GROUP NOTE
Group Therapy Note    Date: 5/27/2024    Group Start Time: 1315  Group End Time: 1400  Group Topic: Recreational    SSR 2 [facility] NON ACUTE    Joanie Fritz        Group Therapy Note    Attendees: 3/8    Recreational Therapist facilitated structured leisure skills group to introduce healthy leisure skills as positive way to cope and manage mood.            Notes:  Did not attend group despite encouragement      Discipline Responsible: Recreational Therapist      Signature:  LIEN Chavarria

## 2024-05-27 NOTE — GROUP NOTE
Group Therapy Note    Date: 5/27/2024    Group Start Time: 1045  Group End Time: 1130  Group Topic: Education Group - Inpatient    SSR 2 [facility] NON ACUTE    Joanie Fritz        Group Therapy Note    Attendees: 4/8  Facilitated structured group discussion to identify stressors, symptoms of stress and positive coping strategies.          Patient's Goal:  Client will learn and demonstrate effective coping skills     Notes:  Arrived to group late. Verbalized he was responsible for peers being upset. PT redirected to the group discussion related to \"The Stress Cycle\". Pt had difficulty identifying stressors, and coping strategies. Responded to staff cues/prompts. Able to sit in session for 30 min. Left group early and did not return.    Status After Intervention:  Improved    Participation Level: Active Listener    Participation Quality: Appropriate      Speech:  normal      Thought Process/Content: Logical      Affective Functioning: Congruent      Mood:  calm       Level of consciousness:  Alert      Response to Learning: Progressing to goal      Endings: None Reported    Modes of Intervention: Education      Discipline Responsible: Recreational Therapist      Signature:  LIEN Chavarria

## 2024-05-28 PROCEDURE — 6370000000 HC RX 637 (ALT 250 FOR IP): Performed by: PSYCHIATRY & NEUROLOGY

## 2024-05-28 PROCEDURE — 1240000000 HC EMOTIONAL WELLNESS R&B

## 2024-05-28 RX ADMIN — VALPROIC ACID 500 MG: 250 SOLUTION ORAL at 21:33

## 2024-05-28 RX ADMIN — ZIPRASIDONE HYDROCHLORIDE 40 MG: 40 CAPSULE ORAL at 17:28

## 2024-05-28 RX ADMIN — ZIPRASIDONE HYDROCHLORIDE 40 MG: 40 CAPSULE ORAL at 08:34

## 2024-05-28 RX ADMIN — VALPROIC ACID 500 MG: 250 SOLUTION ORAL at 08:34

## 2024-05-28 NOTE — GROUP NOTE
Group Therapy Note    Date: 5/28/2024    Group Start Time: 1315  Group End Time: 1400  Group Topic: Process Group - Inpatient    SSR 2 BE[facility] ACUTE    Ketty Bhat        Group Therapy Note  Process group was focused on 7 inner critics and self-esteem. Pts interacted well with others and provided positive feedback to others. Writer asked \"what color describes your current mood\" as an ice breaker and handed out information sheets as well.   Attendees: 5-11           Notes:  Pt was encouraged to attend and chose not to       Signature:  Ketty Bhat

## 2024-05-29 PROCEDURE — 6370000000 HC RX 637 (ALT 250 FOR IP): Performed by: PSYCHIATRY & NEUROLOGY

## 2024-05-29 PROCEDURE — 1240000000 HC EMOTIONAL WELLNESS R&B

## 2024-05-29 RX ADMIN — ZIPRASIDONE HYDROCHLORIDE 40 MG: 40 CAPSULE ORAL at 17:59

## 2024-05-29 RX ADMIN — ZIPRASIDONE HYDROCHLORIDE 40 MG: 40 CAPSULE ORAL at 08:18

## 2024-05-29 RX ADMIN — VALPROIC ACID 500 MG: 250 SOLUTION ORAL at 08:18

## 2024-05-29 RX ADMIN — VALPROIC ACID 500 MG: 250 SOLUTION ORAL at 21:19

## 2024-05-29 NOTE — GROUP NOTE
Group Therapy Note    Date: 5/29/2024    Group Start Time: 0930  Group End Time: 0950  Group Topic: Nursing    SSR 2 BE[facility] ACUTE    Ann Marie Billy LPN        Group Therapy Note    Attendees: 1       Patient's Goal:  Pt.invited to group but declined    Notes:      Status After Intervention:      Participation Level:     Participation Quality:       Speech:        Thought Process/Content:       Affective Functioning:       Mood:       Level of consciousness:        Response to Learning:       Endings:     Modes of Intervention:       Discipline Responsible:       Signature:  Ann Marie Billy LPN

## 2024-05-29 NOTE — GROUP NOTE
Group Therapy Note    Date: 2024    Group Start Time: 900  Group End Time: 920  Group Topic: Community Meeting    [facility]R 2 [facility] Thelma Uribe        Group Therapy Note    Attendees: 9         Patient's Goal:  ***    Notes:  ***    Status After Intervention:  {Status After Intervention:098937909}    Participation Level: {Participation Level:595295769}    Participation Quality: {[facility] PARTICIPATION QUALITY:662590775}      Speech:  {[facility] CD_SPEECH:54915}      Thought Process/Content: {Thought Process/Content:591411354}      Affective Functioning: {Affective Functionin}      Mood: {Mood:363589199}      Level of consciousness:  {Level of consciousness:275348256}      Response to Learning: {[facility] Responses to Learnin}      Endings: {[facility] Endings:91222}    Modes of Intervention: {[facility] Modes of Intervention:645799502}      Discipline Responsible: {[facility] Multidisciplinary:651737250}      Signature:  Thelma Toussaint

## 2024-05-29 NOTE — GROUP NOTE
Group Therapy Note    Date: 5/29/2024    Group Start Time: 0900  Group End Time: 0920  Group Topic: Community Meeting    SSR 2 BE[facility]    Thelma Toussaint        Group Therapy Note    Attendees: 1       Patient's Goal:  community meeting    Notes: patient refused to participate        Discipline Responsible: Be[employer]orial [employer] Tech      Signature:  Thelma Toussaint

## 2024-05-29 NOTE — GROUP NOTE
Group Therapy Note    Date: 2024    Group Start Time: 900  Group End Time: 920  Group Topic: Community Meeting    [facility]R 2 [facility] Thelma Uribe        Group Therapy Note    Attendees: 9         Patient's Goal:  ***    Notes:  ***    Status After Intervention:  {Status After Intervention:586670126}    Participation Level: {Participation Level:775128711}    Participation Quality: {[facility] PARTICIPATION QUALITY:676271656}      Speech:  {[facility] CD_SPEECH:42720}      Thought Process/Content: {Thought Process/Content:886377749}      Affective Functioning: {Affective Functionin}      Mood: {Mood:301980046}      Level of consciousness:  {Level of consciousness:433278357}      Response to Learning: {[facility] Responses to Learnin}      Endings: {[facility] Endings:21499}    Modes of Intervention: {[facility] B[facility] Modes of Intervention:230313133}      Discipline Responsible: {[facility] Multidisciplinary:310033408}      Signature:  Thelma Toussaint

## 2024-05-29 NOTE — GROUP NOTE
Group Therapy Note    Date: 5/28/2024    Group Start Time: 1945  Group End Time: 2030  Group Topic: Recreational    SSR 2 BH NON ACUTE    Joanie Fritz        Group Therapy Note    Attendees: 6/8     Recreational Therapist facilitated structured leisure skills group to introduce healthy leisure skills as positive way to cope and manage mood.        Notes:  Did not attend group despite encouragement    Discipline Responsible: Recreational Therapist      Signature:  LIEN Chavarria     [facility]ascular notified of new consult

## 2024-05-29 NOTE — GROUP NOTE
Group Therapy Note    Date: 2024    Group Start Time: 930  Group End Time: 950  Group Topic: Nursing    SSR 2 BE[facility] ACUTE    Ann Marie Billy LPN        Group Therapy Note    Attendees: 1         Patient's Goal:  ***    Notes:  ***    Status After Intervention:  {Status After Intervention:374123788}    Participation Level: {Participation Level:315240004}    Participation Quality: {[facility] PARTICIPATION QUALITY:526140941}      Speech:  {[facility] CD_SPEECH:87275}      Thought Process/Content: {Thought Process/Content:478526594}      Affective Functioning: {Affective Functionin}      Mood: {Mood:499815539}      Level of consciousness:  {Level of consciousness:162367698}      Response to Learning: {[facility] Responses to Learnin}      Endings: {[facility] Endings:84209}    Modes of Intervention: {[facility] Modes of Intervention:238883657}      Discipline Responsible: {[facility] Multidisciplinary:564512783}      Signature:  Ann Marie Billy LPN

## 2024-05-29 NOTE — GROUP NOTE
Group Therapy Note    Date: 5/29/2024    Group Start Time: 1330  Group End Time: 1400  Group Topic: Process Group - Inpatient    [facility]R 2 [facility]    Liana Dwyer        Group Therapy Note:This writer facilitated a group where the emotion of anxiety was discussed along with its triggers and positive coping skills. Each individual was also provided with a handout on the definition of anxiety, it's symptoms, it's types and various treatments.      Attendees: 2       Patient's Goal:  to attend groups    Notes:  Pt was encouraged to attend but did not.     Signature:  Liana Dwyer

## 2024-05-30 PROCEDURE — 6370000000 HC RX 637 (ALT 250 FOR IP): Performed by: PSYCHIATRY & NEUROLOGY

## 2024-05-30 PROCEDURE — 1240000000 HC EMOTIONAL WELLNESS R&B

## 2024-05-30 RX ORDER — ZIPRASIDONE HYDROCHLORIDE 60 MG/1
60 CAPSULE ORAL 2 TIMES DAILY WITH MEALS
Status: DISCONTINUED | OUTPATIENT
Start: 2024-05-30 | End: 2024-06-07 | Stop reason: HOSPADM

## 2024-05-30 RX ORDER — HALOPERIDOL 5 MG/ML
5 INJECTION INTRAMUSCULAR 2 TIMES DAILY WITH MEALS
Status: DISCONTINUED | OUTPATIENT
Start: 2024-05-30 | End: 2024-06-07 | Stop reason: HOSPADM

## 2024-05-30 RX ADMIN — VALPROIC ACID 500 MG: 250 SOLUTION ORAL at 08:28

## 2024-05-30 RX ADMIN — VALPROIC ACID 500 MG: 250 SOLUTION ORAL at 20:40

## 2024-05-30 RX ADMIN — ZIPRASIDONE HYDROCHLORIDE 60 MG: 60 CAPSULE ORAL at 17:15

## 2024-05-30 RX ADMIN — ZIPRASIDONE HYDROCHLORIDE 40 MG: 40 CAPSULE ORAL at 08:28

## 2024-05-30 ASSESSMENT — PAIN SCALES - GENERAL: PAINLEVEL_OUTOF10: 0

## 2024-05-30 NOTE — GROUP NOTE
Group Therapy Note    Date: 5/30/2024    Group Start Time: 1315  Group End Time: 1330  Group Topic: Process Group - Inpatient    SSR 2 BE[facility] ACUTE    Delfina Kelly MSW        Group Therapy Note: Each pt was encouraged to join process group. Two said they were coming but did not show. Two were sleeping and the other three refused.    Attendees: 0         Notes:  Pt was encouraged to attend group but declined.       Signature:  FLOR VALLES

## 2024-05-30 NOTE — GROUP NOTE
Group Therapy Note    Date: 5/30/2024    Group Start Time: 1520  Group End Time: 1555  Group Topic: Recreational    SSR 2 BH NON ACUTE    Karen Lewis        Group Therapy Note    Facilitated leisure skills group to reinforce positive coping and to manage mood through music, social interaction, group activities and art task       Attendees: 2/7      Notes:  Encouraged but did not attend    Discipline Responsible: Recreational Therapist      Signature:  LIEN Wright

## 2024-05-30 NOTE — GROUP NOTE
Group Therapy Note    Date: 5/29/2024    Group Start Time: 1945  Group End Time: 2030  Group Topic: Recreational    SSR 2 BH NON ACUTE    Joanie Fritz        Group Therapy Note    Attendees: 4/9     Recreational Therapist facilitated structured leisure skills group to introduce healthy leisure skills as positive way to cope and manage mood.          Notes:  Did not attend group despite encouragement    Discipline Responsible: Recreational Therapist      Signature:  LIEN Chavarria

## 2024-05-30 NOTE — GROUP NOTE
Group Therapy Note    Date: 5/30/2024    Group Start Time: 1120  Group End Time: 1150  Group Topic: Education Group - Inpatient    SSR 2  NON ACUTE    Karen Lewis        Group Therapy Note    Facilitated group to focus on defining different types of defense mechanisms and being able to recognize examples of some defenses that was used to cope with stress and anxiety       Attendees: 2/8       Patient's Goal:  Client will learn and demonstrate effective coping skills    Notes:  Receptive to information discussed and engaged. Pt shared \"he has used some of the defenses such as \"devaluation, help-reject complaining and denial\"    Status After Intervention:  Improved    Participation Level: Active Listener and Interactive    Participation Quality: Appropriate, Attentive, and Sharing      Speech:  normal      Thought Process/Content: Logical      Affective Functioning: Congruent      Mood:  Calm      Level of consciousness:  Attentive      Response to Learning: Able to verbalize current knowledge/experience and Progressing to goal      Endings: None Reported    Modes of Intervention: Education and Support      Discipline Responsible: Recreational Therapist      Signature:  LIEN Wright

## 2024-05-31 PROCEDURE — 1240000000 HC EMOTIONAL WELLNESS R&B

## 2024-05-31 PROCEDURE — 6370000000 HC RX 637 (ALT 250 FOR IP): Performed by: PSYCHIATRY & NEUROLOGY

## 2024-05-31 RX ADMIN — ZIPRASIDONE HYDROCHLORIDE 60 MG: 60 CAPSULE ORAL at 08:33

## 2024-05-31 RX ADMIN — TRAZODONE HYDROCHLORIDE 50 MG: 50 TABLET ORAL at 21:08

## 2024-05-31 RX ADMIN — VALPROIC ACID 500 MG: 250 SOLUTION ORAL at 21:08

## 2024-05-31 RX ADMIN — VALPROIC ACID 500 MG: 250 SOLUTION ORAL at 08:31

## 2024-05-31 NOTE — GROUP NOTE
Group Therapy Note    Date: 5/31/2024    Group Start Time: 0955  Group End Time: 1020  Group Topic: Nursing    SSR 2 BE[facility] ACUTE    Ann Marie Billy LPN        Group Therapy Note    Attendees: 2       Patient's Goal:  Relaxation    Notes:  pt.invited to group but declined    Status After Intervention:      Participation Level:     Participation Quality:       Speech:        Thought Process/Content:       Affective Functioning:       Mood:       Level of consciousness:        Response to Learning:       Endings:     Modes of Intervention:       Discipline Responsible:       Signature:  Ann Marie Billy LPN

## 2024-05-31 NOTE — GROUP NOTE
Group Therapy Note    Date: 5/31/2024    Group Start Time: 1300  Group End Time: 1330  Group Topic: Process Group - Inpatient    [facility] 2 [facility] ACUTE    Liana Dwyer        Group Therapy Note: Each individual was encouraged to join process group.    Attendees: 0       Patient's Goal:  to attend groups.    Notes:  Pt was encouraged to join but did not.         Signature:  Liana Dwyer

## 2024-05-31 NOTE — GROUP NOTE
Group Therapy Note    Date: 5/31/2024    Group Start Time: 1535  Group End Time: 1615  Group Topic: Recreational    SSR 2 BH NON ACUTE    Karen Lewis        Group Therapy Note    Facilitated leisure skills group to reinforce positive coping and to manage mood through music, social interaction, group activities and art task       Attendees: 3/8       Notes:  Encouraged but did not attend    Discipline Responsible: Recreational Therapist      Signature:  LIEN Wright

## 2024-06-01 PROCEDURE — 1240000000 HC EMOTIONAL WELLNESS R&B

## 2024-06-01 PROCEDURE — 6370000000 HC RX 637 (ALT 250 FOR IP): Performed by: PSYCHIATRY & NEUROLOGY

## 2024-06-01 RX ADMIN — VALPROIC ACID 500 MG: 250 SOLUTION ORAL at 20:53

## 2024-06-01 RX ADMIN — VALPROIC ACID 500 MG: 250 SOLUTION ORAL at 09:19

## 2024-06-01 RX ADMIN — ZIPRASIDONE HYDROCHLORIDE 60 MG: 60 CAPSULE ORAL at 09:20

## 2024-06-01 RX ADMIN — ZIPRASIDONE HYDROCHLORIDE 60 MG: 60 CAPSULE ORAL at 17:12

## 2024-06-01 NOTE — GROUP NOTE
Group Therapy Note    Date: 6/1/2024    Group Start Time: 1115  Group End Time: 1200  Group Topic: Education Group - Inpatient    SSR 2  NON ACUTE    Karen Lewis        Group Therapy Note    Facilitated group to discuss “reaction patterns” to different situations and recognizing patterns in behaviors that need to be changed for future reactions       Attendees: 5/9       Patient's Goal:  Client will learn and demonstrate effective coping skills    Notes:  Receptive to information discussed and engaged. Was able to share how he usually or most often react to “a stressful event, a new opportunity, a disagreement, to a joyful occasion and meeting someone new”      Status After Intervention:  Improved    Participation Level: Active Listener and Interactive    Participation Quality: Appropriate, Attentive, and Sharing      Speech:  normal      Thought Process/Content: Logical      Affective Functioning: Congruent      Mood:  Calm      Level of consciousness:  Attentive      Response to Learning: Able to verbalize current knowledge/experience and Progressing to goal      Endings: None Reported    Modes of Intervention: Education and Support      Discipline Responsible: Recreational Therapist      Signature:  LIEN Wright

## 2024-06-01 NOTE — GROUP NOTE
Group Therapy Note    Date: 6/1/2024    Group Start Time: 1535  Group End Time: 1623  Group Topic: Recreational    SSR 2 [facility] NON ACUTE    Karen Lewis        Group Therapy Note    Facilitated leisure skills group to reinforce positive coping and to manage mood through music, social interaction, group activities and art task      Attendees: 3/9       Notes:  Encouraged but did not attend    Discipline Responsible: Recreational Therapist      Signature:  LIEN Wright

## 2024-06-02 PROCEDURE — 6370000000 HC RX 637 (ALT 250 FOR IP): Performed by: PSYCHIATRY & NEUROLOGY

## 2024-06-02 PROCEDURE — 1240000000 HC EMOTIONAL WELLNESS R&B

## 2024-06-02 RX ADMIN — VALPROIC ACID 500 MG: 250 SOLUTION ORAL at 20:56

## 2024-06-02 RX ADMIN — ZIPRASIDONE HYDROCHLORIDE 60 MG: 60 CAPSULE ORAL at 17:16

## 2024-06-02 RX ADMIN — VALPROIC ACID 500 MG: 250 SOLUTION ORAL at 08:37

## 2024-06-02 RX ADMIN — ZIPRASIDONE HYDROCHLORIDE 60 MG: 60 CAPSULE ORAL at 08:38

## 2024-06-02 NOTE — GROUP NOTE
Group Therapy Note    Date: 6/2/2024    Group Start Time: 1120  Group End Time: 1200  Group Topic: Education Group - Inpatient    SSR 2  NON ACUTE    Karen Lewis        Group Therapy Note    Facilitated group to focus on understanding the importance of healthy boundaries and developing healthy boundaries to help improve relationships        Attendees: 5/9      Notes:  Encouraged but did not attend    Discipline Responsible: Recreational Therapist      Signature:  LIEN Wright

## 2024-06-02 NOTE — GROUP NOTE
Group Therapy Note    Date: 6/2/2024    Group Start Time: 1535  Group End Time: 1635  Group Topic: Recreational    SSR 2 [facility] NON ACUTE    Karen Lewis        Group Therapy Note    Facilitated leisure skills group to reinforce positive coping and to manage mood through music, social interaction, group activities and art task       Attendees: 5/9       Notes:  Came to group for less than 5 minutes. Left and did not return    Discipline Responsible: Recreational Therapist      Signature:  AIDA WrightS

## 2024-06-03 PROCEDURE — 6370000000 HC RX 637 (ALT 250 FOR IP): Performed by: PSYCHIATRY & NEUROLOGY

## 2024-06-03 PROCEDURE — 1240000000 HC EMOTIONAL WELLNESS R&B

## 2024-06-03 RX ORDER — DIVALPROEX SODIUM 250 MG/1
250 TABLET, DELAYED RELEASE ORAL EVERY 8 HOURS SCHEDULED
Status: DISCONTINUED | OUTPATIENT
Start: 2024-06-04 | End: 2024-06-04 | Stop reason: SDUPTHER

## 2024-06-03 RX ADMIN — VALPROIC ACID 500 MG: 250 SOLUTION ORAL at 21:40

## 2024-06-03 RX ADMIN — ZIPRASIDONE HYDROCHLORIDE 60 MG: 60 CAPSULE ORAL at 07:56

## 2024-06-03 RX ADMIN — VALPROIC ACID 500 MG: 250 SOLUTION ORAL at 07:56

## 2024-06-03 RX ADMIN — ZIPRASIDONE HYDROCHLORIDE 60 MG: 60 CAPSULE ORAL at 17:46

## 2024-06-03 NOTE — GROUP NOTE
Group Therapy Note    Date: 6/3/2024    Group Start Time: 1115  Group End Time: 1200  Group Topic: Education Group - Inpatient    SSR 2  NON ACUTE    Karen Lewis        Group Therapy Note    Facilitated discussion focused on defining and sharing examples of different types of automatic negative thoughts and how they affect moods and behaviors      Attendees: 3/8      Notes:  Encouraged but did not attend    Discipline Responsible: Recreational Therapist      Signature:  LIEN Wright

## 2024-06-03 NOTE — GROUP NOTE
Group Therapy Note    Date: 6/3/2024    Group Start Time: 1300  Group End Time: 1330  Group Topic: Process Group - Inpatient    SSR 2 [facility]    Liana Dwyer        Group Therapy Note: Each individual was provided with a worksheet where they were able to identify \"my strengths and qualities\" and approach any of the  if further assistance was needed to process the responses.     Attendees: 8       Patient's Goal:  to attend groups.    Notes:  Pt was provided with a handout.       Signature:  Liana Dwyer

## 2024-06-03 NOTE — GROUP NOTE
Group Therapy Note    Date: 6/3/2024    Group Start Time: 1005  Group End Time: 1030  Group Topic: Nursing    SSR 2 BEHA [facility] ACUTE    Aminta Martinez RN        Group Therapy Note    Attendees: 3       Patient's Goal:  Power of Positive Thinking    Notes:  pt.invited to  group but declined    Status After Intervention:      Participation Level:     Participation Quality:       Speech:        Thought Process/Content:       Affective Functioning:       Mood:       Level of consciousness:        Response to Learning:       Endings:     Modes of Intervention:       Discipline Responsible:       Signature:  Aminta Martinez RN

## 2024-06-03 NOTE — GROUP NOTE
Group Therapy Note    Date: 6/3/2024    Group Start Time: 1535  Group End Time: 1620  Group Topic: Recreational    SSR 2 BH NON ACUTE    Karen Lewis        Group Therapy Note    Facilitated leisure skills group to reinforce positive coping and to manage mood through music, social interaction, group activities and art task       Attendees: 2/6      Notes:  Encouraged but did not attend    Discipline Responsible: Recreational Therapist      Signature:  LIEN Wright

## 2024-06-04 PROCEDURE — 6370000000 HC RX 637 (ALT 250 FOR IP): Performed by: PSYCHIATRY & NEUROLOGY

## 2024-06-04 PROCEDURE — 1240000000 HC EMOTIONAL WELLNESS R&B

## 2024-06-04 RX ADMIN — DIVALPROEX SODIUM 250 MG: 250 TABLET, DELAYED RELEASE ORAL at 06:41

## 2024-06-04 RX ADMIN — VALPROIC ACID 500 MG: 250 SOLUTION ORAL at 20:53

## 2024-06-04 RX ADMIN — ZIPRASIDONE HYDROCHLORIDE 60 MG: 60 CAPSULE ORAL at 17:27

## 2024-06-04 RX ADMIN — ZIPRASIDONE HYDROCHLORIDE 60 MG: 60 CAPSULE ORAL at 08:26

## 2024-06-04 NOTE — GROUP NOTE
Group Therapy Note    Date: 6/4/2024    Group Start Time: 1320  Group End Time: 1400  Group Topic: Group Therapy    SSR 2 BEHA [facility] ACUTE    Delicia Gomez MSW        Group Therapy Note    The group focused on personal strengths and qualities.         Attendees: 3/8       Patient's Goal:  Attend Groups    Notes: The patient was encouraged to attend group but declined.     Status After Intervention:      Participation Level:     Participation Quality:       Speech:        Thought Process/Content:       Affective Functioning:       Mood:       Level of consciousness:        Response to Learning:       Endings:     Modes of Intervention:       Discipline Responsible: /Counselor      Signature:  FLOR Guido

## 2024-06-05 LAB — VALPROATE SERPL-MCNC: 77 UG/ML (ref 50–100)

## 2024-06-05 PROCEDURE — 1240000000 HC EMOTIONAL WELLNESS R&B

## 2024-06-05 PROCEDURE — 80164 ASSAY DIPROPYLACETIC ACD TOT: CPT

## 2024-06-05 PROCEDURE — 6370000000 HC RX 637 (ALT 250 FOR IP): Performed by: PSYCHIATRY & NEUROLOGY

## 2024-06-05 PROCEDURE — 36415 COLL VENOUS BLD VENIPUNCTURE: CPT

## 2024-06-05 RX ADMIN — ZIPRASIDONE HYDROCHLORIDE 60 MG: 60 CAPSULE ORAL at 08:54

## 2024-06-05 RX ADMIN — VALPROIC ACID 500 MG: 250 SOLUTION ORAL at 21:09

## 2024-06-05 RX ADMIN — VALPROIC ACID 500 MG: 250 SOLUTION ORAL at 08:54

## 2024-06-05 RX ADMIN — ZIPRASIDONE HYDROCHLORIDE 60 MG: 60 CAPSULE ORAL at 17:24

## 2024-06-05 NOTE — GROUP NOTE
Group Therapy Note    Date: 6/4/2024    Group Start Time: 1955  Group End Time: 2040  Group Topic: Recreational    SSR 2 BH NON ACUTE    Joanie Fritz        Group Therapy Note    Attendees: 4/8     Recreational Therapist facilitated structured leisure skills group to introduce healthy leisure skills as positive way to cope and manage mood.        Notes:  Did not attend group despite encouragement      Discipline Responsible: Recreational Therapist      Signature:  LIEN Chavarria

## 2024-06-05 NOTE — GROUP NOTE
Group Therapy Note    Date: 6/5/2024    Group Start Time: 1330  Group End Time: 1400  Group Topic: Process Group - Inpatient    [facility]    Liana Dwyer        Group Therapy Note: Each individual was provided with a worksheet on \"anxiety\" it's triggers and positive coping skills were discussed.     Attendees: 1       Patient's Goal:  to attend groups.    Notes:  pt was encouraged to attend but did not.     Signature:  Liana Dwyer

## 2024-06-06 PROCEDURE — 6370000000 HC RX 637 (ALT 250 FOR IP): Performed by: PSYCHIATRY & NEUROLOGY

## 2024-06-06 PROCEDURE — 1240000000 HC EMOTIONAL WELLNESS R&B

## 2024-06-06 PROCEDURE — 6360000002 HC RX W HCPCS: Performed by: PSYCHIATRY & NEUROLOGY

## 2024-06-06 PROCEDURE — 6360000002 HC RX W HCPCS

## 2024-06-06 RX ADMIN — ZIPRASIDONE HYDROCHLORIDE 60 MG: 60 CAPSULE ORAL at 17:09

## 2024-06-06 RX ADMIN — VALPROIC ACID 500 MG: 250 SOLUTION ORAL at 20:54

## 2024-06-06 RX ADMIN — ZIPRASIDONE HYDROCHLORIDE 60 MG: 60 CAPSULE ORAL at 08:43

## 2024-06-06 RX ADMIN — VALPROIC ACID 500 MG: 250 SOLUTION ORAL at 08:43

## 2024-06-06 RX ADMIN — ARIPIPRAZOLE 300 MG: KIT at 13:38

## 2024-06-06 NOTE — GROUP NOTE
Group Therapy Note    Date: 6/5/2024    Group Start Time: 1945  Group End Time: 2030  Group Topic: Recreational    [facility]    Joanie Fritz        Group Therapy Note    Attendees: 2/9     Recreational Therapist facilitated structured leisure skills group to introduce healthy leisure skills as positive way to cope and manage mood.          Notes:  Did not attend group despite encouragement      Discipline Responsible: Recreational Therapist      Signature:  LIEN Chavarria

## 2024-06-06 NOTE — GROUP NOTE
Group Therapy Note    Date: 6/6/2024    Group Start Time: 1545  Group End Time: 1630  Group Topic: Recreational    [facility]    Karen Lewis        Group Therapy Note    Facilitated leisure skills group to reinforce positive coping and to manage mood through music, social interaction, group activities and art task       Attendees: 2/8      Notes:  Encouraged but did not attend    Discipline Responsible: Recreational Therapist      Signature:  LIEN Wright

## 2024-06-06 NOTE — GROUP NOTE
Group Therapy Note    Date: 6/6/2024    Group Start Time: 1515  Group End Time: 1545  Group Topic: Education Group - Inpatient    [facility] AC[facility]    Karen Lewis        Group Therapy Note    Facilitated group to focus on “exploring values “and “utilized values discussion questions       Attendees: 2/8      Notes:  Encouraged but did not attend    Discipline Responsible: Recreational Therapist      Signature:  LIEN Wright

## 2024-06-06 NOTE — GROUP NOTE
Group Therapy Note    Date: 6/6/2024    Group Start Time: 1300  Group End Time: 1345  Group Topic: Process Group - Inpatient    [facility]    Delfina Kelly MSW        Group Therapy Note: Facilitator encouraged the group to identify emotions and discuss advantages of processing thoughts and feelings.     Attendees: 2           Notes:  Pt was encouraged to attend group but declined.     Signature:  FLOR VALLES

## 2024-06-07 VITALS
RESPIRATION RATE: 18 BRPM | DIASTOLIC BLOOD PRESSURE: 86 MMHG | WEIGHT: 196 LBS | OXYGEN SATURATION: 100 % | HEIGHT: 73 IN | TEMPERATURE: 98.3 F | SYSTOLIC BLOOD PRESSURE: 132 MMHG | HEART RATE: 80 BPM | BODY MASS INDEX: 25.98 KG/M2

## 2024-06-07 PROCEDURE — 6370000000 HC RX 637 (ALT 250 FOR IP): Performed by: PSYCHIATRY & NEUROLOGY

## 2024-06-07 RX ORDER — ZIPRASIDONE HYDROCHLORIDE 60 MG/1
60 CAPSULE ORAL 2 TIMES DAILY WITH MEALS
Qty: 60 CAPSULE | Refills: 1 | Status: SHIPPED | OUTPATIENT
Start: 2024-06-07

## 2024-06-07 RX ORDER — ZIPRASIDONE HYDROCHLORIDE 20 MG/1
20 CAPSULE ORAL EVERY 12 HOURS PRN
Qty: 60 CAPSULE | Refills: 3 | Status: SHIPPED | OUTPATIENT
Start: 2024-06-07

## 2024-06-07 RX ORDER — TRAZODONE HYDROCHLORIDE 50 MG/1
50 TABLET ORAL NIGHTLY PRN
Qty: 30 TABLET | Refills: 1 | Status: SHIPPED | OUTPATIENT
Start: 2024-06-07

## 2024-06-07 RX ORDER — VALPROIC ACID 250 MG/5ML
500 SOLUTION ORAL 2 TIMES DAILY
Qty: 600 ML | Refills: 1 | Status: SHIPPED | OUTPATIENT
Start: 2024-06-07

## 2024-06-07 RX ORDER — HYDROXYZINE 50 MG/1
50 TABLET, FILM COATED ORAL 3 TIMES DAILY PRN
Qty: 30 TABLET | Refills: 1 | Status: SHIPPED | OUTPATIENT
Start: 2024-06-07 | End: 2024-06-27

## 2024-06-07 RX ADMIN — ZIPRASIDONE HYDROCHLORIDE 60 MG: 60 CAPSULE ORAL at 08:11

## 2024-06-07 RX ADMIN — VALPROIC ACID 500 MG: 250 SOLUTION ORAL at 08:12

## 2024-06-07 ASSESSMENT — PAIN SCALES - GENERAL: PAINLEVEL_OUTOF10: 0

## 2024-06-07 NOTE — GROUP NOTE
Group Therapy Note    Date: 6/7/2024    Group Start Time: 0900  Group End Time: 0915  Group Topic: Community Meeting    [facility]    Emma Burdick        Group Therapy Note    Attendees: 5       Patient's Goal:  PATIENT WAS ENCOURAGE TO ATTEND BUT DECLINED.    Notes:      Status After Intervention:      Participation Level:     Participation Quality:       Speech:        Thought Process/Content:       Affective Functioning:       Mood:       Level of consciousness:        Response to Learning:       Endings:     Modes of Intervention:       Discipline Responsible: Behavorial [employer]      Signature:  Emma Burdick

## 2024-06-07 NOTE — DISCHARGE SUMMARY
Urobilinogen, Urine 05/20/2024 4.0 (H)  0.1 - 1.0 EU/dL Final    Nitrite, Urine 05/20/2024 Negative  Negative   Final    Leukocyte Esterase, Urine 05/20/2024 Negative  Negative   Final    WBC, UA 05/20/2024 0-4  0 - 4 /hpf Final    RBC, UA 05/20/2024 0-5  0 - 5 /hpf Final    Epithelial Cells, UA 05/20/2024 Few  Few /lpf Final    BACTERIA, URINE 05/20/2024 Negative  Negative /hpf Final    Mucus, UA 05/20/2024 4+ (A)  Negative /lpf Final    Valproic Acid 06/05/2024 77  50 - 100 ug/mL Final     No results found.                DISPOSITION:    Patient to f/u with psychiatric and psychotherapy appointments.              FOLLOW-UP CARE:    {discharge activity:46100}  {diet:95629}  Wound Care: none needed.  [facility]  Address: [address] [place] [id number] [phone].  Follow up  The ACT team with the above agency will follow up with you for support after discharge.               PROGNOSIS:    Limited ---- based on nature of patient's pathology/ies and treatment compliance issues.  Prognosis is greatly dependent upon patient's ability to follow up psychiatric/psychotherapy appointments as well as to comply with psychiatric medications as prescribed.            DISCHARGE MEDICATIONS:    Informed consent given for the use of following psychotropic medications:     Medication List        START taking these medications      hydrOXYzine HCl 50 MG tablet  Commonly known as: ATARAX  Take 1 tablet by mouth 3 times daily as needed for Anxiety     valproic acid 250 MG/5ML Soln oral solution  Commonly known as: DEPAKENE  Take 10 mLs by mouth 2 times daily            CHANGE how you take these medications      * ziprasidone 60 MG capsule  Commonly known as: GEODON  Take 1 capsule by mouth with breakfast and with evening meal  What changed:   medication strength  how much to take  when to take this     * ziprasidone 20 MG capsule  Commonly known as: GEODON  Take 1 capsule by mouth

## 2024-06-07 NOTE — PROGRESS NOTES
PSYCHIATRIC PROGRESS NOTE            Patient Name  Art Rainey     Date of Birth 1985     [facility] [id number]     Medical Record Number  [id number]       Age  39 y.o.     PCP David Cabrera MD     Admit date:  5/20/2024       Room Number  242/01     Date of Service  6/1/2024             INTERVAL STATUS:      Interval Status: not changed            BOLAÑOS and anxietynBJECTIVE NOTE:      Patient seen today for follow up. Patient states that he is \"feeling much better since before I got here\". He is medication compliant without side effects present. He does attend groups. Says that his energy and appetite are good. States that he is not sleeping well - he is dreaming a lot. Rates his depression 2 out of 10 and his anxiety 1 out of 10. Denies SI, HI, and AVH. Patient states that \"my emotions are blunted. I don't feel despair or emotional attachment. I am emotionally detached. I'm just an empty shell.\" Explained to give his medications some more time to work. He expressed understanding. Patient given the opportunity to ask questions but declined to do so.          Review of Systems (ROS):    All 14-point review of systems are completed and unless otherwise noted in the Subjective text above, the patient has denied all and are all negative.         MENTAL STATUS EXAM & VITALS         Mental Status Exam:   Level of consciousness:  Mild dysattention (reduced clarity of awareness with impaired ability to focus, sustain, or shift attention) and awake  Appearance:  disheveled, hospital attire, and seated in bed  Behavior/Motor:  no abnormalities noted  Attitude toward examiner:  cooperative, attentive, and good eye contact  Speech:  spontaneous, normal volume, and slow  Mood:  constricted, depressed  Affect:  mood congruent and blunted  Thought processes:  linear, coherent, and circumstantial  Thought content:  Denies homicidal ideation  Suicidal Ideation:  denies suicidal ideation  Delusions:  no evidence of 
  PSYCHIATRIC PROGRESS NOTE            Patient Name  Art Rainey     Date of Birth 1985     [facility] [id number]     Medical Record Number  [id number]       Age  39 y.o.     PCP David Cabrera MD     Admit date:  5/20/2024       Room Number  242/01     Date of Service  6/2/2024             INTERVAL STATUS:      Interval Status: not changed            BOLAÑOS and anxietynBJECTIVE NOTE:      Patient seen today for follow up. Patient states that he is \"feeling much better since before I got here\". He is medication compliant without side effects present. He does attend groups. Says that his energy and appetite are good. States that he is not sleeping well - he is dreaming a lot. Rates his depression 2 out of 10 and his anxiety 1 out of 10. Denies SI, HI, and AVH. Patient states that \"my emotions are blunted. I don't feel despair or emotional attachment. I am emotionally detached. I'm just an empty shell.\" Explained to give his medications some more time to work. He expressed understanding. Patient given the opportunity to ask questions but declined to do so.          Review of Systems (ROS):    All 14-point review of systems are completed and unless otherwise noted in the Subjective text above, the patient has denied all and are all negative.         MENTAL STATUS EXAM & VITALS         Mental Status Exam:   Level of consciousness:  Mild dysattention (reduced clarity of awareness with impaired ability to focus, sustain, or shift attention) and awake  Appearance:  disheveled, hospital attire, and seated in bed  Behavior/Motor:  no abnormalities noted  Attitude toward examiner:  cooperative, attentive, and good eye contact  Speech:  spontaneous, normal volume, and slow  Mood:  constricted, depressed  Affect:  mood congruent and blunted  Thought processes:  linear, coherent, and circumstantial  Thought content:  Denies homicidal ideation  Suicidal Ideation:  denies suicidal ideation  Delusions:  no evidence of 
  PSYCHIATRIC PROGRESS NOTE            Patient Name  Art Rainey     Date of Birth 1985     [facility] [id number]     Medical Record Number  [id number]       Age  39 y.o.     PCP David Cabrera MD     Admit date:  5/20/2024       Room Number  242/01     Date of Service  6/2/2024             INTERVAL STATUS:      Interval Status: not changed            SUBJECTIVE NOTE:      Patient is seen today for follow-up.  When asked how patient is feeling today the patient states, \"I am making it\".  The patient states that he is contemplating his thoughts are still trying to figure out his place within his family and within life.  The patient states that he slept okay last night without dreams or without nightmares.  He did comment that his sleep \"felt like a blink of an eye\" -meaning it went really fast.  He goes on to state that his life feels like a blur.  Patient indicated that his social security disability check was cut off.  The patient states that he has a whole lot of concerns and has symptoms of depression and when he does have symptoms of depression he tends to isolate.  Patient encouraged not to isolate in room given several ideas of things he could do to not isolate.  Patient appeared not to be receptive of any of the ideas.  Patient is currently taking medications without side effects.  Patient denies depression, anxiety, suicidal ideation, homicidal ideation and audiovisual hallucinations.  The patient was given opportunity to ask questions but declined to do so.        Review of Systems (ROS):    All 14-point review of systems are completed and unless otherwise noted in the Subjective text above, the patient has denied all and are all negative.         MENTAL STATUS EXAM & VITALS         Mental Status Exam:   Level of consciousness:  Moderate dysattention (reduced clarity of awareness with impaired ability to focus, sustain, or shift attention) and awake  Appearance:  hospital attire and seated in 
 Hospitalist Progress Note  [facility]    Subjective:   Daily Progress Note: 2024 8:30 AM    No new complaints    Current Facility-Administered Medications   Medication Dose Route Frequency    ziprasidone (GEODON) capsule 20 mg  20 mg Oral Daily    clonazePAM (KLONOPIN) tablet 1 mg  1 mg Oral Q8H PRN    ziprasidone (GEODON) capsule 20 mg  20 mg Oral Q12H PRN    ziprasidone (GEODON) injection 20 mg  20 mg IntraMUSCular Q12H PRN    acetaminophen (TYLENOL) tablet 650 mg  650 mg Oral Q4H PRN    hydrOXYzine HCl (ATARAX) tablet 50 mg  50 mg Oral TID PRN    traZODone (DESYREL) tablet 50 mg  50 mg Oral Nightly PRN    valproic acid (DEPAKENE) 250 MG/5ML oral solution 500 mg  500 mg Oral BID        Review of Systems  Constitutional: negative for fevers, chills, sweats, and fatigue  Eyes: negative for redness and icterus  Ears, nose, mouth, throat, and face: negative for ear drainage, nasal congestion, sore throat, and voice change  Respiratory: negative for cough, wheezing, or dyspnea on exertion  Cardiovascular: negative for chest pain, dyspnea, palpitations, lower extremity edema  Gastrointestinal: negative for nausea, vomiting, diarrhea, and abdominal pain  Genitourinary:negative for frequency, dysuria, and hematuria  Integument/breast: negative for skin lesion(s) and dryness  Hematologic/lymphatic: negative for easy bruising, bleeding, and lymphadenopathy  Musculoskeletal:negative for neck pain, back pain, and muscle weakness  Neurological: negative for headaches, dizziness, and weakness  Behavioral/Psych: negative for anxiety and depression  Allergic/Immunologic: negative for urticaria and angioedema        Objective:     /85   Pulse (!) 106   Temp 98.8 °F (37.1 °C) (Oral)   Resp 18   Ht 1.854 m (6' 1\")   Wt 88.9 kg (196 lb)   SpO2 100%   BMI 25.86 kg/m²         Temp (24hrs), Av.8 °F (37.1 °C), Min:98.8 °F (37.1 °C), Max:98.8 °F (37.1 °C)      No intake/output data 
 Hospitalist Progress Note  [facility]    Subjective:   Daily Progress Note: 2024 11:57 AM    No new complaints    Current Facility-Administered Medications   Medication Dose Route Frequency    haloperidol lactate (HALDOL) injection 5 mg  5 mg IntraMUSCular BID with meals    Or    ziprasidone (GEODON) capsule 60 mg  60 mg Oral BID with meals    clonazePAM (KLONOPIN) tablet 1 mg  1 mg Oral Q8H PRN    ziprasidone (GEODON) capsule 20 mg  20 mg Oral Q12H PRN    ziprasidone (GEODON) injection 20 mg  20 mg IntraMUSCular Q12H PRN    acetaminophen (TYLENOL) tablet 650 mg  650 mg Oral Q4H PRN    hydrOXYzine HCl (ATARAX) tablet 50 mg  50 mg Oral TID PRN    traZODone (DESYREL) tablet 50 mg  50 mg Oral Nightly PRN    valproic acid (DEPAKENE) 250 MG/5ML oral solution 500 mg  500 mg Oral BID        Review of Systems  Constitutional: negative for fevers, chills, sweats, and fatigue  Eyes: negative for redness and icterus  Ears, nose, mouth, throat, and face: negative for ear drainage, nasal congestion, sore throat, and voice change  Respiratory: negative for cough, wheezing, or dyspnea on exertion  Cardiovascular: negative for chest pain, dyspnea, palpitations, lower extremity edema  Gastrointestinal: negative for nausea, vomiting, diarrhea, and abdominal pain  Genitourinary:negative for frequency, dysuria, and hematuria  Integument/breast: negative for skin lesion(s) and dryness  Hematologic/lymphatic: negative for easy bruising, bleeding, and lymphadenopathy  Musculoskeletal:negative for neck pain, back pain, and muscle weakness  Neurological: negative for headaches, dizziness, and weakness  Behavioral/Psych: negative for anxiety and depression  Allergic/Immunologic: negative for urticaria and angioedema        Objective:     /66   Pulse 63   Temp 98.1 °F (36.7 °C) (Oral)   Resp 18   Ht 1.854 m (6' 1\")   Wt 88.9 kg (196 lb)   SpO2 99%   BMI 25.86 kg/m²         Temp (24hrs), Av.3 °F 
 Hospitalist Progress Note  [facility]    Subjective:   Daily Progress Note: 6/3/2024 9:56 AM    No new complaints    Current Facility-Administered Medications   Medication Dose Route Frequency    [START ON 6/4/2024] divalproex (DEPAKOTE) DR tablet 250 mg  250 mg Oral 3 times per day    haloperidol lactate (HALDOL) injection 5 mg  5 mg IntraMUSCular BID with meals    Or    ziprasidone (GEODON) capsule 60 mg  60 mg Oral BID with meals    clonazePAM (KLONOPIN) tablet 1 mg  1 mg Oral Q8H PRN    ziprasidone (GEODON) capsule 20 mg  20 mg Oral Q12H PRN    ziprasidone (GEODON) injection 20 mg  20 mg IntraMUSCular Q12H PRN    acetaminophen (TYLENOL) tablet 650 mg  650 mg Oral Q4H PRN    hydrOXYzine HCl (ATARAX) tablet 50 mg  50 mg Oral TID PRN    traZODone (DESYREL) tablet 50 mg  50 mg Oral Nightly PRN    valproic acid (DEPAKENE) 250 MG/5ML oral solution 500 mg  500 mg Oral BID        Review of Systems  Constitutional: negative for fevers, chills, sweats, and fatigue  Eyes: negative for redness and icterus  Ears, nose, mouth, throat, and face: negative for ear drainage, nasal congestion, sore throat, and voice change  Respiratory: negative for cough, wheezing, or dyspnea on exertion  Cardiovascular: negative for chest pain, dyspnea, palpitations, lower extremity edema  Gastrointestinal: negative for nausea, vomiting, diarrhea, and abdominal pain  Genitourinary:negative for frequency, dysuria, and hematuria  Integument/breast: negative for skin lesion(s) and dryness  Hematologic/lymphatic: negative for easy bruising, bleeding, and lymphadenopathy  Musculoskeletal:negative for neck pain, back pain, and muscle weakness  Neurological: negative for headaches, dizziness, and weakness  Behavioral/Psych: negative for anxiety and depression  Allergic/Immunologic: negative for urticaria and angioedema        Objective:     /86   Pulse 80   Temp 97.7 °F (36.5 °C) (Oral)   Resp 12   Ht 1.854 m (6' 1\")  
 Hospitalist Progress Note  [facility]    Subjective:   Daily Progress Note: 2024 9:04 AM    No new complaints    Current Facility-Administered Medications   Medication Dose Route Frequency    haloperidol lactate (HALDOL) injection 5 mg  5 mg IntraMUSCular BID with meals    Or    ziprasidone (GEODON) capsule 40 mg  40 mg Oral BID with meals    clonazePAM (KLONOPIN) tablet 1 mg  1 mg Oral Q8H PRN    ziprasidone (GEODON) capsule 20 mg  20 mg Oral Q12H PRN    ziprasidone (GEODON) injection 20 mg  20 mg IntraMUSCular Q12H PRN    acetaminophen (TYLENOL) tablet 650 mg  650 mg Oral Q4H PRN    hydrOXYzine HCl (ATARAX) tablet 50 mg  50 mg Oral TID PRN    traZODone (DESYREL) tablet 50 mg  50 mg Oral Nightly PRN    valproic acid (DEPAKENE) 250 MG/5ML oral solution 500 mg  500 mg Oral BID        Review of Systems  Constitutional: negative for fevers, chills, sweats, and fatigue  Eyes: negative for redness and icterus  Ears, nose, mouth, throat, and face: negative for ear drainage, nasal congestion, sore throat, and voice change  Respiratory: negative for cough, wheezing, or dyspnea on exertion  Cardiovascular: negative for chest pain, dyspnea, palpitations, lower extremity edema  Gastrointestinal: negative for nausea, vomiting, diarrhea, and abdominal pain  Genitourinary:negative for frequency, dysuria, and hematuria  Integument/breast: negative for skin lesion(s) and dryness  Hematologic/lymphatic: negative for easy bruising, bleeding, and lymphadenopathy  Musculoskeletal:negative for neck pain, back pain, and muscle weakness  Neurological: negative for headaches, dizziness, and weakness  Behavioral/Psych: negative for anxiety and depression  Allergic/Immunologic: negative for urticaria and angioedema        Objective:     /87   Pulse 91   Temp 98.1 °F (36.7 °C) (Oral)   Resp 18   Ht 1.854 m (6' 1\")   Wt 88.9 kg (196 lb)   SpO2 100%   BMI 25.86 kg/m²         Temp (24hrs), Av °F 
 Hospitalist Progress Note  [facility]    Subjective:   Daily Progress Note: 6/4/2024 9:56 AM    No new complaints    Current Facility-Administered Medications   Medication Dose Route Frequency    divalproex (DEPAKOTE) DR tablet 250 mg  250 mg Oral 3 times per day    haloperidol lactate (HALDOL) injection 5 mg  5 mg IntraMUSCular BID with meals    Or    ziprasidone (GEODON) capsule 60 mg  60 mg Oral BID with meals    clonazePAM (KLONOPIN) tablet 1 mg  1 mg Oral Q8H PRN    ziprasidone (GEODON) capsule 20 mg  20 mg Oral Q12H PRN    ziprasidone (GEODON) injection 20 mg  20 mg IntraMUSCular Q12H PRN    acetaminophen (TYLENOL) tablet 650 mg  650 mg Oral Q4H PRN    hydrOXYzine HCl (ATARAX) tablet 50 mg  50 mg Oral TID PRN    traZODone (DESYREL) tablet 50 mg  50 mg Oral Nightly PRN    valproic acid (DEPAKENE) 250 MG/5ML oral solution 500 mg  500 mg Oral BID        Review of Systems  Constitutional: negative for fevers, chills, sweats, and fatigue  Eyes: negative for redness and icterus  Ears, nose, mouth, throat, and face: negative for ear drainage, nasal congestion, sore throat, and voice change  Respiratory: negative for cough, wheezing, or dyspnea on exertion  Cardiovascular: negative for chest pain, dyspnea, palpitations, lower extremity edema  Gastrointestinal: negative for nausea, vomiting, diarrhea, and abdominal pain  Genitourinary:negative for frequency, dysuria, and hematuria  Integument/breast: negative for skin lesion(s) and dryness  Hematologic/lymphatic: negative for easy bruising, bleeding, and lymphadenopathy  Musculoskeletal:negative for neck pain, back pain, and muscle weakness  Neurological: negative for headaches, dizziness, and weakness  Behavioral/Psych: negative for anxiety and depression  Allergic/Immunologic: negative for urticaria and angioedema        Objective:     /74   Pulse 84   Temp 97.6 °F (36.4 °C) (Oral)   Resp 17   Ht 1.854 m (6' 1\")   Wt 88.9 kg (196 
 Hospitalist Progress Note  [facility]    Subjective:   Daily Progress Note: 2024 8:30 AM    No new complaints    Current Facility-Administered Medications   Medication Dose Route Frequency    ziprasidone (GEODON) capsule 20 mg  20 mg Oral Daily    clonazePAM (KLONOPIN) tablet 1 mg  1 mg Oral Q8H PRN    ziprasidone (GEODON) capsule 20 mg  20 mg Oral Q12H PRN    ziprasidone (GEODON) injection 20 mg  20 mg IntraMUSCular Q12H PRN    acetaminophen (TYLENOL) tablet 650 mg  650 mg Oral Q4H PRN    hydrOXYzine HCl (ATARAX) tablet 50 mg  50 mg Oral TID PRN    traZODone (DESYREL) tablet 50 mg  50 mg Oral Nightly PRN    valproic acid (DEPAKENE) 250 MG/5ML oral solution 500 mg  500 mg Oral BID        Review of Systems  Constitutional: negative for fevers, chills, sweats, and fatigue  Eyes: negative for redness and icterus  Ears, nose, mouth, throat, and face: negative for ear drainage, nasal congestion, sore throat, and voice change  Respiratory: negative for cough, wheezing, or dyspnea on exertion  Cardiovascular: negative for chest pain, dyspnea, palpitations, lower extremity edema  Gastrointestinal: negative for nausea, vomiting, diarrhea, and abdominal pain  Genitourinary:negative for frequency, dysuria, and hematuria  Integument/breast: negative for skin lesion(s) and dryness  Hematologic/lymphatic: negative for easy bruising, bleeding, and lymphadenopathy  Musculoskeletal:negative for neck pain, back pain, and muscle weakness  Neurological: negative for headaches, dizziness, and weakness  Behavioral/Psych: negative for anxiety and depression  Allergic/Immunologic: negative for urticaria and angioedema        Objective:     /85   Pulse (!) 106   Temp 98.8 °F (37.1 °C) (Oral)   Resp 18   Ht 1.854 m (6' 1\")   Wt 88.9 kg (196 lb)   SpO2 100%   BMI 25.86 kg/m²         Temp (24hrs), Av.8 °F (37.1 °C), Min:98.8 °F (37.1 °C), Max:98.8 °F (37.1 °C)      No intake/output data 
 Hospitalist Progress Note  [facility]    Subjective:   Daily Progress Note: 2024 11:47 AM    No new complaints    Current Facility-Administered Medications   Medication Dose Route Frequency    haloperidol lactate (HALDOL) injection 5 mg  5 mg IntraMUSCular BID with meals    Or    ziprasidone (GEODON) capsule 60 mg  60 mg Oral BID with meals    clonazePAM (KLONOPIN) tablet 1 mg  1 mg Oral Q8H PRN    ziprasidone (GEODON) capsule 20 mg  20 mg Oral Q12H PRN    ziprasidone (GEODON) injection 20 mg  20 mg IntraMUSCular Q12H PRN    acetaminophen (TYLENOL) tablet 650 mg  650 mg Oral Q4H PRN    hydrOXYzine HCl (ATARAX) tablet 50 mg  50 mg Oral TID PRN    traZODone (DESYREL) tablet 50 mg  50 mg Oral Nightly PRN    valproic acid (DEPAKENE) 250 MG/5ML oral solution 500 mg  500 mg Oral BID        Review of Systems  Constitutional: negative for fevers, chills, sweats, and fatigue  Eyes: negative for redness and icterus  Ears, nose, mouth, throat, and face: negative for ear drainage, nasal congestion, sore throat, and voice change  Respiratory: negative for cough, wheezing, or dyspnea on exertion  Cardiovascular: negative for chest pain, dyspnea, palpitations, lower extremity edema  Gastrointestinal: negative for nausea, vomiting, diarrhea, and abdominal pain  Genitourinary:negative for frequency, dysuria, and hematuria  Integument/breast: negative for skin lesion(s) and dryness  Hematologic/lymphatic: negative for easy bruising, bleeding, and lymphadenopathy  Musculoskeletal:negative for neck pain, back pain, and muscle weakness  Neurological: negative for headaches, dizziness, and weakness  Behavioral/Psych: negative for anxiety and depression  Allergic/Immunologic: negative for urticaria and angioedema        Objective:     /72   Pulse 76   Temp 98.7 °F (37.1 °C) (Oral)   Resp 17   Ht 1.854 m (6' 1\")   Wt 88.9 kg (196 lb)   SpO2 99%   BMI 25.86 kg/m²         Temp (24hrs), Av.5 °F 
 Hospitalist Progress Note  [facility]    Subjective:   Daily Progress Note: 2024 9:26 AM    No new complaints    Current Facility-Administered Medications   Medication Dose Route Frequency    haloperidol lactate (HALDOL) injection 5 mg  5 mg IntraMUSCular BID with meals    Or    ziprasidone (GEODON) capsule 60 mg  60 mg Oral BID with meals    clonazePAM (KLONOPIN) tablet 1 mg  1 mg Oral Q8H PRN    ziprasidone (GEODON) capsule 20 mg  20 mg Oral Q12H PRN    ziprasidone (GEODON) injection 20 mg  20 mg IntraMUSCular Q12H PRN    acetaminophen (TYLENOL) tablet 650 mg  650 mg Oral Q4H PRN    hydrOXYzine HCl (ATARAX) tablet 50 mg  50 mg Oral TID PRN    traZODone (DESYREL) tablet 50 mg  50 mg Oral Nightly PRN    valproic acid (DEPAKENE) 250 MG/5ML oral solution 500 mg  500 mg Oral BID        Review of Systems  Constitutional: negative for fevers, chills, sweats, and fatigue  Eyes: negative for redness and icterus  Ears, nose, mouth, throat, and face: negative for ear drainage, nasal congestion, sore throat, and voice change  Respiratory: negative for cough, wheezing, or dyspnea on exertion  Cardiovascular: negative for chest pain, dyspnea, palpitations, lower extremity edema  Gastrointestinal: negative for nausea, vomiting, diarrhea, and abdominal pain  Genitourinary:negative for frequency, dysuria, and hematuria  Integument/breast: negative for skin lesion(s) and dryness  Hematologic/lymphatic: negative for easy bruising, bleeding, and lymphadenopathy  Musculoskeletal:negative for neck pain, back pain, and muscle weakness  Neurological: negative for headaches, dizziness, and weakness  Behavioral/Psych: negative for anxiety and depression  Allergic/Immunologic: negative for urticaria and angioedema        Objective:     /82   Pulse (!) 103   Temp 99.1 °F (37.3 °C) (Oral)   Resp 20   Ht 1.854 m (6' 1\")   Wt 88.9 kg (196 lb)   SpO2 97%   BMI 25.86 kg/m²         Temp (24hrs), Av °F 
 Hospitalist Progress Note  [facility]    Subjective:   Daily Progress Note: 2024 8:15 AM    No new complaints    Current Facility-Administered Medications   Medication Dose Route Frequency    haloperidol lactate (HALDOL) injection 5 mg  5 mg IntraMUSCular BID with meals    Or    ziprasidone (GEODON) capsule 40 mg  40 mg Oral BID with meals    clonazePAM (KLONOPIN) tablet 1 mg  1 mg Oral Q8H PRN    ziprasidone (GEODON) capsule 20 mg  20 mg Oral Q12H PRN    ziprasidone (GEODON) injection 20 mg  20 mg IntraMUSCular Q12H PRN    acetaminophen (TYLENOL) tablet 650 mg  650 mg Oral Q4H PRN    hydrOXYzine HCl (ATARAX) tablet 50 mg  50 mg Oral TID PRN    traZODone (DESYREL) tablet 50 mg  50 mg Oral Nightly PRN    valproic acid (DEPAKENE) 250 MG/5ML oral solution 500 mg  500 mg Oral BID        Review of Systems  Constitutional: negative for fevers, chills, sweats, and fatigue  Eyes: negative for redness and icterus  Ears, nose, mouth, throat, and face: negative for ear drainage, nasal congestion, sore throat, and voice change  Respiratory: negative for cough, wheezing, or dyspnea on exertion  Cardiovascular: negative for chest pain, dyspnea, palpitations, lower extremity edema  Gastrointestinal: negative for nausea, vomiting, diarrhea, and abdominal pain  Genitourinary:negative for frequency, dysuria, and hematuria  Integument/breast: negative for skin lesion(s) and dryness  Hematologic/lymphatic: negative for easy bruising, bleeding, and lymphadenopathy  Musculoskeletal:negative for neck pain, back pain, and muscle weakness  Neurological: negative for headaches, dizziness, and weakness  Behavioral/Psych: negative for anxiety and depression  Allergic/Immunologic: negative for urticaria and angioedema        Objective:     /80   Pulse 64   Temp 98.6 °F (37 °C) (Oral)   Resp 16   Ht 1.854 m (6' 1\")   Wt 88.9 kg (196 lb)   SpO2 100%   BMI 25.86 kg/m²         Temp (24hrs), Av.4 °F 
 Hospitalist Progress Note  [facility]    Subjective:   Daily Progress Note: 2024 4:59 PM    No new complaints    Current Facility-Administered Medications   Medication Dose Route Frequency    haloperidol lactate (HALDOL) injection 5 mg  5 mg IntraMUSCular BID with meals    Or    ziprasidone (GEODON) capsule 60 mg  60 mg Oral BID with meals    clonazePAM (KLONOPIN) tablet 1 mg  1 mg Oral Q8H PRN    ziprasidone (GEODON) capsule 20 mg  20 mg Oral Q12H PRN    ziprasidone (GEODON) injection 20 mg  20 mg IntraMUSCular Q12H PRN    acetaminophen (TYLENOL) tablet 650 mg  650 mg Oral Q4H PRN    hydrOXYzine HCl (ATARAX) tablet 50 mg  50 mg Oral TID PRN    traZODone (DESYREL) tablet 50 mg  50 mg Oral Nightly PRN    valproic acid (DEPAKENE) 250 MG/5ML oral solution 500 mg  500 mg Oral BID        Review of Systems  Constitutional: negative for fevers, chills, sweats, and fatigue  Eyes: negative for redness and icterus  Ears, nose, mouth, throat, and face: negative for ear drainage, nasal congestion, sore throat, and voice change  Respiratory: negative for cough, wheezing, or dyspnea on exertion  Cardiovascular: negative for chest pain, dyspnea, palpitations, lower extremity edema  Gastrointestinal: negative for nausea, vomiting, diarrhea, and abdominal pain  Genitourinary:negative for frequency, dysuria, and hematuria  Integument/breast: negative for skin lesion(s) and dryness  Hematologic/lymphatic: negative for easy bruising, bleeding, and lymphadenopathy  Musculoskeletal:negative for neck pain, back pain, and muscle weakness  Neurological: negative for headaches, dizziness, and weakness  Behavioral/Psych: negative for anxiety and depression  Allergic/Immunologic: negative for urticaria and angioedema        Objective:     /77   Pulse 75   Temp 98.1 °F (36.7 °C) (Oral)   Resp 22   Ht 1.854 m (6' 1\")   Wt 88.9 kg (196 lb)   SpO2 90%   BMI 25.86 kg/m²         Temp (24hrs), Av.2 °F 
 Hospitalist Progress Note  [facility]    Subjective:   Daily Progress Note: 2024 5:09 PM    No new complaints    Current Facility-Administered Medications   Medication Dose Route Frequency    haloperidol lactate (HALDOL) injection 5 mg  5 mg IntraMUSCular BID with meals    Or    ziprasidone (GEODON) capsule 60 mg  60 mg Oral BID with meals    clonazePAM (KLONOPIN) tablet 1 mg  1 mg Oral Q8H PRN    ziprasidone (GEODON) capsule 20 mg  20 mg Oral Q12H PRN    ziprasidone (GEODON) injection 20 mg  20 mg IntraMUSCular Q12H PRN    acetaminophen (TYLENOL) tablet 650 mg  650 mg Oral Q4H PRN    hydrOXYzine HCl (ATARAX) tablet 50 mg  50 mg Oral TID PRN    traZODone (DESYREL) tablet 50 mg  50 mg Oral Nightly PRN    valproic acid (DEPAKENE) 250 MG/5ML oral solution 500 mg  500 mg Oral BID        Review of Systems  Constitutional: negative for fevers, chills, sweats, and fatigue  Eyes: negative for redness and icterus  Ears, nose, mouth, throat, and face: negative for ear drainage, nasal congestion, sore throat, and voice change  Respiratory: negative for cough, wheezing, or dyspnea on exertion  Cardiovascular: negative for chest pain, dyspnea, palpitations, lower extremity edema  Gastrointestinal: negative for nausea, vomiting, diarrhea, and abdominal pain  Genitourinary:negative for frequency, dysuria, and hematuria  Integument/breast: negative for skin lesion(s) and dryness  Hematologic/lymphatic: negative for easy bruising, bleeding, and lymphadenopathy  Musculoskeletal:negative for neck pain, back pain, and muscle weakness  Neurological: negative for headaches, dizziness, and weakness  Behavioral/Psych: negative for anxiety and depression  Allergic/Immunologic: negative for urticaria and angioedema        Objective:     /82   Pulse 84   Temp 97.9 °F (36.6 °C) (Oral)   Resp 17   Ht 1.854 m (6' 1\")   Wt 88.9 kg (196 lb)   SpO2 100%   BMI 25.86 kg/m²         Temp (24hrs), Av °F (36.7 
 Hospitalist Progress Note  [facility]    Subjective:   Daily Progress Note: 2024 5:23 PM    No new complaints    Current Facility-Administered Medications   Medication Dose Route Frequency    haloperidol lactate (HALDOL) injection 5 mg  5 mg IntraMUSCular BID with meals    Or    ziprasidone (GEODON) capsule 40 mg  40 mg Oral BID with meals    clonazePAM (KLONOPIN) tablet 1 mg  1 mg Oral Q8H PRN    ziprasidone (GEODON) capsule 20 mg  20 mg Oral Q12H PRN    ziprasidone (GEODON) injection 20 mg  20 mg IntraMUSCular Q12H PRN    acetaminophen (TYLENOL) tablet 650 mg  650 mg Oral Q4H PRN    hydrOXYzine HCl (ATARAX) tablet 50 mg  50 mg Oral TID PRN    traZODone (DESYREL) tablet 50 mg  50 mg Oral Nightly PRN    valproic acid (DEPAKENE) 250 MG/5ML oral solution 500 mg  500 mg Oral BID        Review of Systems  Constitutional: negative for fevers, chills, sweats, and fatigue  Eyes: negative for redness and icterus  Ears, nose, mouth, throat, and face: negative for ear drainage, nasal congestion, sore throat, and voice change  Respiratory: negative for cough, wheezing, or dyspnea on exertion  Cardiovascular: negative for chest pain, dyspnea, palpitations, lower extremity edema  Gastrointestinal: negative for nausea, vomiting, diarrhea, and abdominal pain  Genitourinary:negative for frequency, dysuria, and hematuria  Integument/breast: negative for skin lesion(s) and dryness  Hematologic/lymphatic: negative for easy bruising, bleeding, and lymphadenopathy  Musculoskeletal:negative for neck pain, back pain, and muscle weakness  Neurological: negative for headaches, dizziness, and weakness  Behavioral/Psych: negative for anxiety and depression  Allergic/Immunologic: negative for urticaria and angioedema        Objective:     /75   Pulse 70   Temp 97.9 °F (36.6 °C) (Oral)   Resp 17   Ht 1.854 m (6' 1\")   Wt 88.9 kg (196 lb)   SpO2 100%   BMI 25.86 kg/m²         Temp (24hrs), Av.8 °F 
PSYCHIATRIC PROGRESS NOTE         Patient Name  Art Rainey   Date of Birth 1985   [facility] [id number]   Medical Record Number  [id number]      Age  39 y.o.   PCP No primary care provider on file.   Admit date:  5/20/2024    Room Number  242/01   [facility]   Date of Service  5/29/2024            HISTORY OF PRESENT ILLNESS/INTERVAL HISTORY:    Patient is a 39-year-old male admitted to the behavioral health unit for schizoaffective disorder and acute psychosis.  At time of our examination, patient was sitting up in bed with appropriate eye contact and answered all questions.  He does not appear to be in any physical distress.  He did not state any suicidal or homicidal ideations.  Although patient's insight continues to be poor, patient took his medication without prompting this time.  He was encouraged to attend group sessions.  Thought process is still slow.            MENTAL STATUS EXAM & VITALS     Patient is alert, oriented x3   Patient appears stated age  Gait is normal  Flat affect  Discharge  Speech is coherent, moderate volume, no flight of ideas, no loosening of associations.  Casually dressed and groomed  No Active suicidal ideations, plan or intent.  No active homicidal ideations plan or intent  No command hallucinations  Thought Processes linear  Not seen responding to any active internal stimuli  No persecutory delusions  Patient experiences delusional and paranoia although improved from yesterday  Insight and judgment remains poor               VITALS:     Patient Vitals for the past 24 hrs:   Temp Pulse Resp BP   05/29/24 0735 97.9 °F (36.6 °C) 70 17 110/75   05/28/24 2021 97.7 °F (36.5 °C) 97 17 109/75     Wt Readings from Last 3 Encounters:   05/20/24 88.9 kg (196 lb)   03/21/24 94.8 kg (209 lb)   07/09/23 97.5 kg (215 lb)     Temp Readings from Last 3 Encounters:   05/29/24 97.9 °F (36.6 °C) (Oral)   03/22/24 98.2 °F (36.8 °C) (Oral)   07/21/23 98.4 °F (36.9 °C) (Oral)     BP 
PSYCHIATRIC PROGRESS NOTE         Patient Name  Art Rainey   Date of Birth 1985   [facility] [id number]   Medical Record Number  [id number]      Age  39 y.o.   PCP No primary care provider on file.   Admit date:  5/20/2024    Room Number  242/01   [facility]   Date of Service  5/25/2024            HISTORY OF PRESENT ILLNESS/INTERVAL HISTORY:    Patient is a 39-year-old male admitted to the behavioral health unit for schizoaffective disorder.  It is noted through previous medical charts that patient refuses vitals stating \"I do not need them because I am not real.\"  Thought blocking has been noted.  He continues to not be in touch with reality as he continues to believe the behavioral unit team is all artificial intelligence.  He stated wishes that he does not want to take any medications that would make him sleepy.  A nurse informed him that he does not need to take his trazodone if he does not want to and rather that it is an as needed medication.    At the time of our examination, patient was seen resting in bed.  He does not appear to be in any acute distress.  He does not vocalize any active complaints.  He did not vocalize any questions.            MENTAL STATUS EXAM & VITALS       Patient is alert, oriented x3   Patient disheveled  Appears stated age  Flat affect  Patient does not appear to be in any active distress  No Active suicidal ideations, plan or intent.  No active homicidal ideations plan or intent  No command hallucinations  Thought Processes , disorganized, delusional   Not seen responding to any active internal stimuli  No persecutory delusions  Insight impaired  Judgement impaired             VITALS:     No data found.  Wt Readings from Last 3 Encounters:   05/20/24 88.9 kg (196 lb)   03/21/24 94.8 kg (209 lb)   07/09/23 97.5 kg (215 lb)     Temp Readings from Last 3 Encounters:   03/22/24 98.2 °F (36.8 °C) (Oral)   07/21/23 98.4 °F (36.9 °C) (Oral)     BP Readings from Last 3 
PSYCHIATRIC PROGRESS NOTE         Patient Name  Art Rainey   Date of Birth 1985   [facility] [id number]   Medical Record Number  [id number]      Age  39 y.o.   PCP No primary care provider on file.   Admit date:  5/20/2024    Room Number  242/01   [facility]   Date of Service  5/30/2024            HISTORY OF PRESENT ILLNESS/INTERVAL HISTORY:    Patient is a 39-year-old male admitted to the behavioral health unit for schizoaffective disorder.  He was sitting up in his bed at time of examination with a flat affect and soft-spoken.  He usually whispers and makes poor eye contact.  He still does not have any decision-making skills.  He states that he feels \"confused.\"  And that he \"cannot decide on what's right or wrong.\"  He states that he simply has no feelings and that he struggles with ongoing thoughts.      Patient denies any suicidal or homicidal ideations at this time.  He denies any auditory or visual hallucinations.            MENTAL STATUS EXAM & VITALS     Patient is alert, oriented x3   Flat affect  Patient appears stated age  Disheveled  Poor eye contact  Speech is coherent, soft-spoken, moderate volume, no flight of ideas, no loosening of associations.  Casually dressed and groomed  No Active suicidal ideations, plan or intent.  No active homicidal ideations plan or intent  No command hallucinations  Thought Processes not linear  Patient can be seen whispering as a response to some sort of internal stimuli  No persecutory delusions  Insight and judgment remains poor               VITALS:     Patient Vitals for the past 24 hrs:   Temp Pulse Resp BP SpO2   05/30/24 0701 98.6 °F (37 °C) 64 16 116/80 100 %   05/29/24 1918 98.1 °F (36.7 °C) 62 20 125/82 99 %     Wt Readings from Last 3 Encounters:   05/20/24 88.9 kg (196 lb)   03/21/24 94.8 kg (209 lb)   07/09/23 97.5 kg (215 lb)     Temp Readings from Last 3 Encounters:   05/30/24 98.6 °F (37 °C) (Oral)   03/22/24 98.2 °F (36.8 °C) (Oral) 
PSYCHIATRIC PROGRESS NOTE         Patient Name  Art Rainey   Date of Birth 1985   [facility] [id number]   Medical Record Number  [id number]      Age  39 y.o.   PCP No primary care provider on file.   Admit date:  5/20/2024    Room Number  242/01   [facility]   Date of Service  5/23/2024            HISTORY OF PRESENT ILLNESS/INTERVAL HISTORY:    Patient is a 3-year-old male admitted to the behavioral health services for schizoaffective disorder.  Has difficulty expressing self, disorg thoughts. He was seen in the day room working on a word search.  He stated his mood is \"terrible\" and that \"I am in turmoil.\"  Throughout the exam patient seems internally preoccupied with limited engagement, poor eye contact, and is soft-spoken with 1 or 2 word responses.  He did not state any active questions and does not appear to be in any acute distress            MENTAL STATUS EXAM & VITALS       Patient is alert, oriented speech is minimal  Casually dressed and groomed  No Active suicidal ideations, plan or intent.  No active homicidal ideations plan or intent  No command hallucinations  Thought Processes disorganized  Appears to be responding to some internal stimuli  Flat affect  No persecutory delusions  Insight and judgment is poor                VITALS:     Patient Vitals for the past 24 hrs:   Temp Pulse Resp BP   05/23/24 0830 98.1 °F (36.7 °C) 98 18 134/68   05/22/24 1907 98.8 °F (37.1 °C) (!) 106 18 137/85     Wt Readings from Last 3 Encounters:   05/20/24 88.9 kg (196 lb)   03/21/24 94.8 kg (209 lb)   07/09/23 97.5 kg (215 lb)     Temp Readings from Last 3 Encounters:   05/23/24 98.1 °F (36.7 °C) (Oral)   03/22/24 98.2 °F (36.8 °C) (Oral)   07/21/23 98.4 °F (36.9 °C) (Oral)     BP Readings from Last 3 Encounters:   05/23/24 134/68   03/22/24 129/87   07/21/23 (!) 126/91     Pulse Readings from Last 3 Encounters:   05/23/24 98   03/22/24 88   07/21/23 82            DATA     LABORATORY 
PSYCHIATRIC PROGRESS NOTE         Patient Name  Art Rainey   Date of Birth 1985   [facility] [id number]   Medical Record Number  [id number]      Age  39 y.o.   PCP No primary care provider on file.   Admit date:  5/20/2024    Room Number  242/01   [facility]   Date of Service  5/22/2024            HISTORY OF PRESENT ILLNESS/INTERVAL HISTORY:    Patient is a 39-year-old -American male with a past psychiatric history of schizoaffective disorder, and acute psychosis.  He has multiple episodes of him blaming himself for many things, being harming self, and 1 episode of him exiting his room naked.  He is under TDO orders.    Patient initially presented to the hospital in the ER due to acute psychosis as the family stated that he stopped taking his psych medications.     At the time of our examination patient was awoken by us.  He was nonverbal and status with me or asking questions.  Patient did not appear to be in any acute distress.  He is scheduled for a hearing by the  today.  Patient did not voice any active complaints regarding medications or behavior.            MENTAL STATUS EXAM & VITALS     Patient is alert, oriented x3   Speech is coherent, moderate volume, no flight of ideas, no loosening of associations.  Disheveled  Modified mood congruent on examination  No Active suicidal ideations, plan or intent.  No active homicidal ideations plan or intent  No command hallucinations  Thought Processes linear  Not seen responding to any active internal stimuli  No persecutory delusions  Insight limited  Judgement limited               VITALS:     Patient Vitals for the past 24 hrs:   Temp Pulse Resp BP SpO2   05/22/24 0713 98 °F (36.7 °C) (!) 127 20 135/88 --   05/21/24 1911 100 °F (37.8 °C) (!) 114 18 (!) 136/93 100 %     Wt Readings from Last 3 Encounters:   05/20/24 88.9 kg (196 lb)   03/21/24 94.8 kg (209 lb)   07/09/23 97.5 kg (215 lb)     Temp Readings from Last 3 Encounters: 
PSYCHIATRIC PROGRESS NOTE         Patient Name  Art aRiney   Date of Birth 1985   [facility] [id number]   Medical Record Number  [id number]      Age  39 y.o.   PCP No primary care provider on file.   Admit date:  5/20/2024    Room Number  242/01   [facility]   Date of Service  5/28/2024            HISTORY OF PRESENT ILLNESS/INTERVAL HISTORY:    Patient is a 39-year-old male admitted to the behavioral health unit for schizoaffective disorder and acute psychosis.  At time of our examination, patient does not appear to be in any physical distress.  He does not state any active questions.  Patient does not complain of sleep.    Patient's insight continues to be poor as he does not believe that he is in any need of medications.  He continues to isolate primarily in his room.  He was encouraged to attend group sessions.  Thought process is slow and still experiences paranoia and delusions.  Patient still requires a lot of prompting for medications.            MENTAL STATUS EXAM & VITALS       Patient is alert, oriented x3   Patient appears stated age  Gait is normal  Flat affect  Disheveled  Speech is slow, moderate volume, no flight of ideas, no loosening of associations.  No Active suicidal ideations, plan or intent.  No active homicidal ideations plan or intent  No command hallucinations  Thought Processes slow  Not seen responding to any active internal stimuli  Patient still experiences delusional and paranoia  Insight and judgment remains poor             VITALS:     Patient Vitals for the past 24 hrs:   Temp Pulse Resp BP SpO2   05/28/24 0831 98.1 °F (36.7 °C) 91 18 135/87 100 %   05/27/24 2130 97.9 °F (36.6 °C) 87 18 123/80 --   05/27/24 1854 97.9 °F (36.6 °C) 87 18 123/80 --     Wt Readings from Last 3 Encounters:   05/20/24 88.9 kg (196 lb)   03/21/24 94.8 kg (209 lb)   07/09/23 97.5 kg (215 lb)     Temp Readings from Last 3 Encounters:   05/28/24 98.1 °F (36.7 °C) (Oral)   03/22/24 98.2 °F 
Progress Note          Room:[facility]/01  Patient Name:Art Rainey     YOB: 1985     Age:39 y.o.        Subjective    Subjective  has been more coherant, not mumbling, more organized conversations, has been continuing to make progress.  Has been compliant with medications.  Review of Systems  Objective         Vitals Last 24 Hours:  TEMPERATURE:  Temp  Av.8 °F (36.6 °C)  Min: 97.6 °F (36.4 °C)  Max: 98 °F (36.7 °C)  RESPIRATIONS RANGE: Resp  Av.5  Min: 17  Max: 18  PULSE OXIMETRY RANGE: No data recorded  PULSE RANGE: Pulse  Av.5  Min: 83  Max: 84  BLOOD PRESSURE RANGE: Systolic (24hrs), Av , Min:110 , Max:126   ; Diastolic (24hrs), Av, Min:74, Max:80    I/O (24Hr):  No intake or output data in the 24 hours ending 24 0024  Objective:  Vital signs: (most recent): Blood pressure 110/80, pulse 83, temperature 98 °F (36.7 °C), temperature source Oral, resp. rate 18, height 1.854 m (6' 1\"), weight 88.9 kg (196 lb), SpO2 100 %.      Labs/Imaging/Diagnostics    Labs:  CBC:No results for input(s): \"WBC\", \"RBC\", \"HGB\", \"HCT\", \"MCV\", \"RDW\", \"PLT\" in the last 72 hours.  CHEMISTRIES:No results for input(s): \"NA\", \"K\", \"CL\", \"CO2\", \"BUN\", \"CREATININE\", \"GLUCOSE\", \"PHOS\", \"MG\" in the last 72 hours.    Invalid input(s): \"CA\"  PT/INR:No results for input(s): \"PROTIME\", \"INR\" in the last 72 hours.  APTT:No results for input(s): \"APTT\" in the last 72 hours.  LIVER PROFILE:No results for input(s): \"AST\", \"ALT\", \"BILIDIR\", \"BILITOT\", \"ALKPHOS\" in the last 72 hours.    Imaging Last 24 Hours:  No results found.  Assessment//Plan           Hospital Problems             Last Modified POA    * (Principal) Schizoaffective disorder (HCC) 2024 Yes   Assessment & Plan  Continue current medications.  Still presents with impaired coping and insight.  Continue to explore step down planning as noted from family, he can't return to [facility]      Current Facility-Administered Medications:     
Progress Note        Room:[facility]/01  Patient Name:Art Rainey     YOB: 1985     Age:39 y.o.        Subjective    Subjective continuing to make slow progress, wants to go home, family not ready for him. He does not wasn't tp go to [facility].  Review of Systems  Objective         Vitals Last 24 Hours:  TEMPERATURE:  Temp  Av.1 °F (36.7 °C)  Min: 97.9 °F (36.6 °C)  Max: 98.2 °F (36.8 °C)  RESPIRATIONS RANGE: Resp  Av.5  Min: 17  Max: 18  PULSE OXIMETRY RANGE: SpO2  Av %  Min: 97 %  Max: 97 %  PULSE RANGE: Pulse  Av  Min: 74  Max: 84  BLOOD PRESSURE RANGE: Systolic (24hrs), Av , Min:116 , Max:126   ; Diastolic (24hrs), Av, Min:68, Max:82    I/O (24Hr):  No intake or output data in the 24 hours ending 24 0026  Objective  Labs/Imaging/Diagnostics    Labs:  CBC:No results for input(s): \"WBC\", \"RBC\", \"HGB\", \"HCT\", \"MCV\", \"RDW\", \"PLT\" in the last 72 hours.  CHEMISTRIES:No results for input(s): \"NA\", \"K\", \"CL\", \"CO2\", \"BUN\", \"CREATININE\", \"GLUCOSE\", \"PHOS\", \"MG\" in the last 72 hours.    Invalid input(s): \"CA\"  PT/INR:No results for input(s): \"PROTIME\", \"INR\" in the last 72 hours.  APTT:No results for input(s): \"APTT\" in the last 72 hours.  LIVER PROFILE:No results for input(s): \"AST\", \"ALT\", \"BILIDIR\", \"BILITOT\", \"ALKPHOS\" in the last 72 hours.    Imaging Last 24 Hours:  No results found.  Assessment//Plan           Hospital Problems             Last Modified POA    * (Principal) Schizoaffective disorder (HCC) 2024 Yes     Assessment & Plan  Need safe discharge planning  Refused monthly decanoate injection for compliance  Electronically signed by Soo Macias MD on 24 at 12:26 AM EDT    
Progress Note  Date:2024       Room:[facility]  Patient Name:Art Rainey     YOB: 1985     Age:39 y.o.        Subjective    Subjective   Patient agreeable in taking long acting abilify which he ahs taken in the past. He still insiists on going home with gmom, discussed that he needs to be more compliant with meds and care for self   Review of Systems  Objective         Vitals Last 24 Hours:  TEMPERATURE:  Temp  Av.1 °F (36.7 °C)  Min: 98 °F (36.7 °C)  Max: 98.1 °F (36.7 °C)  RESPIRATIONS RANGE: Resp  Av  Min: 20  Max: 22  PULSE OXIMETRY RANGE: SpO2  Av %  Min: 90 %  Max: 100 %  PULSE RANGE: Pulse  Av.5  Min: 75  Max: 88  BLOOD PRESSURE RANGE: Systolic (24hrs), Av , Min:112 , Max:129   ; Diastolic (24hrs), Av, Min:77, Max:77    I/O (24Hr):  No intake or output data in the 24 hours ending 24 2309  Objective  Labs/Imaging/Diagnostics    Labs:  CBC:No results for input(s): \"WBC\", \"RBC\", \"HGB\", \"HCT\", \"MCV\", \"RDW\", \"PLT\" in the last 72 hours.  CHEMISTRIES:No results for input(s): \"NA\", \"K\", \"CL\", \"CO2\", \"BUN\", \"CREATININE\", \"GLUCOSE\", \"PHOS\", \"MG\" in the last 72 hours.    Invalid input(s): \"CA\"  PT/INR:No results for input(s): \"PROTIME\", \"INR\" in the last 72 hours.  APTT:No results for input(s): \"APTT\" in the last 72 hours.  LIVER PROFILE:No results for input(s): \"AST\", \"ALT\", \"BILIDIR\", \"BILITOT\", \"ALKPHOS\" in the last 72 hours.    Imaging Last 24 Hours:  No results found.  Assessment//Plan           Hospital Problems             Last Modified POA    * (Principal) Schizoaffective disorder (HCC) 2024 Yes     Assessment & Plan  Tolerated abilify maintaine 300 mg Im today  No dystonic symptoms  Discharge planning ongoing    Current Facility-Administered Medications:     haloperidol lactate (HALDOL) injection 5 mg, 5 mg, IntraMUSCular, BID with meals **OR** ziprasidone (GEODON) capsule 60 mg, 60 mg, Oral, BID with meals, Soo Macias MD, 60 mg at 24 3270    
Progress Note  Date:2024       Room:[facility]  Patient Name:Art Rainey     YOB: 1985     Age:39 y.o.        Subjective    Subjective has been continue to make slow progress.  He is able to keep small conversations and has been coming in and out of his room.  He states he does participate in a few groups for some time.  Review of Systems  Objective         Vitals Last 24 Hours:  TEMPERATURE:  Temp  Av.8 °F (37.1 °C)  Min: 98.4 °F (36.9 °C)  Max: 99.1 °F (37.3 °C)  RESPIRATIONS RANGE: Resp  Av  Min: 18  Max: 20  PULSE OXIMETRY RANGE: No data recorded  PULSE RANGE: Pulse  Av  Min: 71  Max: 103  BLOOD PRESSURE RANGE: Systolic (24hrs), Av , Min:118 , Max:123   ; Diastolic (24hrs), Av, Min:80, Max:82    I/O (24Hr):  No intake or output data in the 24 hours ending 24  Objective  Labs/Imaging/Diagnostics    Labs:  CBC:No results for input(s): \"WBC\", \"RBC\", \"HGB\", \"HCT\", \"MCV\", \"RDW\", \"PLT\" in the last 72 hours.  CHEMISTRIES:No results for input(s): \"NA\", \"K\", \"CL\", \"CO2\", \"BUN\", \"CREATININE\", \"GLUCOSE\", \"PHOS\", \"MG\" in the last 72 hours.    Invalid input(s): \"CA\"  PT/INR:No results for input(s): \"PROTIME\", \"INR\" in the last 72 hours.  APTT:No results for input(s): \"APTT\" in the last 72 hours.  LIVER PROFILE:No results for input(s): \"AST\", \"ALT\", \"BILIDIR\", \"BILITOT\", \"ALKPHOS\" in the last 72 hours.    Imaging Last 24 Hours:  No results found.  Assessment//Plan           Hospital Problems             Last Modified POA    * (Principal) Schizoaffective disorder (HCC) 2024 Yes     Assessment & Plan  Continue current medications  Provided support  No dystonic symptoms noted.  Was discussed that team has been keeping in touch with his aunt.  Electronically signed by Soo Macias MD on 24 at 9:47 PM EDT    
Progress Note  Date:2024       Room:[facility]  Patient Name:Art Rainey     YOB: 1985     Age:39 y.o.        Subjective    Subjective   Patient continues to present paranoid delusional mumbling to himself.  Dismissive of medications and needs a lot of encouragement.  Review of Systems  Objective         Vitals Last 24 Hours:  TEMPERATURE:  Temp  Av.9 °F (36.6 °C)  Min: 97.9 °F (36.6 °C)  Max: 97.9 °F (36.6 °C)  RESPIRATIONS RANGE: Resp  Av  Min: 17  Max: 17  PULSE OXIMETRY RANGE: No data recorded  PULSE RANGE: Pulse  Av  Min: 89  Max: 89  BLOOD PRESSURE RANGE: Systolic (24hrs), Av , Min:126 , Max:126   ; Diastolic (24hrs), Av, Min:94, Max:94    I/O (24Hr):  No intake or output data in the 24 hours ending 24 1410  Objective  Labs/Imaging/Diagnostics    Labs:  CBC:No results for input(s): \"WBC\", \"RBC\", \"HGB\", \"HCT\", \"MCV\", \"RDW\", \"PLT\" in the last 72 hours.  CHEMISTRIES:No results for input(s): \"NA\", \"K\", \"CL\", \"CO2\", \"BUN\", \"CREATININE\", \"GLUCOSE\", \"PHOS\", \"MG\" in the last 72 hours.    Invalid input(s): \"CA\"  PT/INR:No results for input(s): \"PROTIME\", \"INR\" in the last 72 hours.  APTT:No results for input(s): \"APTT\" in the last 72 hours.  LIVER PROFILE:No results for input(s): \"AST\", \"ALT\", \"BILIDIR\", \"BILITOT\", \"ALKPHOS\" in the last 72 hours.    Imaging Last 24 Hours:  No results found.  Assessment//Plan           Hospital Problems             Last Modified POA    * (Principal) Schizoaffective disorder (HCC) 2024 Yes     Assessment & Plan  Continue current medications and treatment  Denies any side effects.  The dystonic symptoms noted    Electronically signed by Soo Macias MD on 24 at 2:10 PM EDT    
Progress Note  Date:5/26/2024       Room:[facility]  Patient Name:Art Rainey     YOB: 1985     Age:39 y.o.        Subjective    Subjective patient expresses that he does not need to be here in the hospital.  He does not believe he needs medication.  He still isolates to.  His thought process continuing to make slow progress but still delusional internally preoccupied impaired insight.  Review of Systems  Objective         Vitals Last 24 Hours:  TEMPERATURE:  No data recorded  RESPIRATIONS RANGE: No data recorded  PULSE OXIMETRY RANGE: No data recorded  PULSE RANGE: No data recorded  BLOOD PRESSURE RANGE: No data recorded.  ; No data recorded.    I/O (24Hr):  No intake or output data in the 24 hours ending 05/26/24 1126  Objective  Labs/Imaging/Diagnostics    Labs:  CBC:No results for input(s): \"WBC\", \"RBC\", \"HGB\", \"HCT\", \"MCV\", \"RDW\", \"PLT\" in the last 72 hours.  CHEMISTRIES:No results for input(s): \"NA\", \"K\", \"CL\", \"CO2\", \"BUN\", \"CREATININE\", \"GLUCOSE\", \"PHOS\", \"MG\" in the last 72 hours.    Invalid input(s): \"CA\"  PT/INR:No results for input(s): \"PROTIME\", \"INR\" in the last 72 hours.  APTT:No results for input(s): \"APTT\" in the last 72 hours.  LIVER PROFILE:No results for input(s): \"AST\", \"ALT\", \"BILIDIR\", \"BILITOT\", \"ALKPHOS\" in the last 72 hours.    Imaging Last 24 Hours:  No results found.  Assessment//Plan           Hospital Problems             Last Modified POA    * (Principal) Schizoaffective disorder (HCC) 5/20/2024 Yes     Assessment & Plan  Continue current medications  No dystonic symptoms noted.  Encourage participation in therapy  Will continue to reach out his support system  Will also continue to explore long-acting decanoate injection for maintenance and stability in the community  Electronically signed by Soo Macias MD on 5/26/24 at 11:26 AM EDT    
Progress Note  Date:6/3/2024       [place]  Patient Name:Art Rainey     YOB: 1985     Age:39 y.o.        Subjective    Subjective has been continuing to make progress.  Has been compliant with medications.  Review of Systems  Objective         Vitals Last 24 Hours:  TEMPERATURE:  Temp  Av.7 °F (36.5 °C)  Min: 97.6 °F (36.4 °C)  Max: 97.7 °F (36.5 °C)  RESPIRATIONS RANGE: Resp  Avg: 15  Min: 12  Max: 18  PULSE OXIMETRY RANGE: SpO2  Av %  Min: 100 %  Max: 100 %  PULSE RANGE: Pulse  Av.5  Min: 79  Max: 80  BLOOD PRESSURE RANGE: Systolic (24hrs), Av , Min:115 , Max:118   ; Diastolic (24hrs), Av, Min:82, Max:86    I/O (24Hr):  No intake or output data in the 24 hours ending 24 6949  Objective  Labs/Imaging/Diagnostics    Labs:  CBC:No results for input(s): \"WBC\", \"RBC\", \"HGB\", \"HCT\", \"MCV\", \"RDW\", \"PLT\" in the last 72 hours.  CHEMISTRIES:No results for input(s): \"NA\", \"K\", \"CL\", \"CO2\", \"BUN\", \"CREATININE\", \"GLUCOSE\", \"PHOS\", \"MG\" in the last 72 hours.    Invalid input(s): \"CA\"  PT/INR:No results for input(s): \"PROTIME\", \"INR\" in the last 72 hours.  APTT:No results for input(s): \"APTT\" in the last 72 hours.  LIVER PROFILE:No results for input(s): \"AST\", \"ALT\", \"BILIDIR\", \"BILITOT\", \"ALKPHOS\" in the last 72 hours.    Imaging Last 24 Hours:  No results found.  Assessment//Plan           Hospital Problems             Last Modified POA    * (Principal) Schizoaffective disorder (HCC) 2024 Yes     Assessment & Plan  Continue current medications.  Still presents with impaired coping and insight.    Current Facility-Administered Medications:     divalproex (DEPAKOTE) DR tablet 250 mg, 250 mg, Oral, 3 times per day, Soo Macias MD    haloperidol lactate (HALDOL) injection 5 mg, 5 mg, IntraMUSCular, BID with meals **OR** ziprasidone (GEODON) capsule 60 mg, 60 mg, Oral, BID with meals, Soo Macias MD, 60 mg at 24 6231    clonazePAM (KLONOPIN) tablet 1 mg, 1 mg, 
Spiritual Care Assessment/Progress Note  [facility]    Name: Art Rainey MRN: [id number]    Age: 39 y.o.     Sex: male   Language: English     Date: 5/21/2024            Total Time Calculated: 7 min              Spiritual Assessment begun in [facility]  Service Provided For: Patient  Referral/Consult From: Physician  Encounter Overview/Reason: Behavioral Health, Initial Encounter    Spiritual beliefs:      [] Involved in a rodriguez tradition/spiritual practice:      [] Supported by a rodriguez community:      [] Claims no spiritual orientation:      [] Seeking spiritual identity:           [] Adheres to an individual form of spirituality:      [x] Not able to assess:                Identified resources for coping and support system:   Support System: Unknown       [] Prayer                  [] Devotional reading               [] Music                  [] Guided Imagery     [] Pet visits                                        [] Other: (COMMENT)     Specific area/focus of visit   Encounter:    Crisis:    Spiritual/Emotional needs: Type: Spiritual Support  Ritual, Rites and Sacraments:    Grief, Loss, and Adjustments:    Ethics/Mediation:    Behavioral Health: Type : Initial Encounter  Palliative Care:    Advance Care Planning:      Plan/Referrals: Other (Comment) ( is available if needed)    Narrative: [facility]- Spiritual Care Consult visit in 2 [facility].  communicated with the [facility] nurse, and  met the patient at the group room. However, the patient refused to see  at this time of visit by saying he does not know what to say.  respected the patient's need and wish.  informed the patient of  availability.  also informed the nurse to contact the spiritual health team if needed.    Rev. Manda Skelton Mdiv.  Chaplain Resident  [place] a : [phone]       
(36.7 °C), Min:97.9 °F (36.6 °C), Max:98.1 °F (36.7 °C)      No intake/output data recorded.  No intake/output data recorded.    PHYSICAL EXAM:  Skin: Extremities and face reveal no rashes.   HEENT: Sclerae anicteric. Extra-occular muscles are intact. No oral ulcers. No ENT discharge. The neck is supple.   Cardiovascular: Regular rate and rhythm. No murmurs, gallops, or rubs. PMI nondisplaced.   Respiratory:  Clear breath sounds bilaterally with no wheezes, rales, or rhonchi.   GI: Abdomen nondistended, soft, and nontender. Normal active bowel sounds. No enlargement of the liver or spleen. No masses palpable.   Rectal: Deferred   Musculoskeletal: No pitting edema of the lower legs. Extremities have good range of motion. No costovertebral tenderness.   Neurological:  Patient is alert and oriented. Cranial nerves II-XII grossly intact  Psychiatric: Mood appears appropriate with judgement intact.   Lymphatic: No cervical or supraclavicular adenopathy.    Additional comments:I reviewed the patient's new clinical lab test results.      Data Review    No results found for this or any previous visit (from the past 24 hour(s)).      Assessment/Plan:     Principal Problem:    Schizoaffective disorder (HCC)  Resolved Problems:    * No resolved hospital problems. *        Care Plan discussed with: Patient/Family    Total time spent with patient: 20 minutes.    
(36.8 °C), Min:98 °F (36.7 °C), Max:98.3 °F (36.8 °C)      No intake/output data recorded.  No intake/output data recorded.    PHYSICAL EXAM:  Skin: Extremities and face reveal no rashes.   HEENT: Sclerae anicteric. Extra-occular muscles are intact. No oral ulcers. No ENT discharge. The neck is supple.   Cardiovascular: Regular rate and rhythm. No murmurs, gallops, or rubs. PMI nondisplaced.   Respiratory:  Clear breath sounds bilaterally with no wheezes, rales, or rhonchi.   GI: Abdomen nondistended, soft, and nontender. Normal active bowel sounds. No enlargement of the liver or spleen. No masses palpable.   Rectal: Deferred   Musculoskeletal: No pitting edema of the lower legs. Extremities have good range of motion. No costovertebral tenderness.   Neurological:  Patient is alert and oriented. Cranial nerves II-XII grossly intact  Psychiatric: Mood appears appropriate with judgement intact.   Lymphatic: No cervical or supraclavicular adenopathy.    Additional comments:I reviewed the patient's new clinical lab test results.      Data Review    No results found for this or any previous visit (from the past 24 hour(s)).        Assessment/Plan:     Principal Problem:    Schizoaffective disorder (HCC)  Resolved Problems:    * No resolved hospital problems. *        Care Plan discussed with: Patient/Family    Total time spent with patient: 20 minutes.    
intake/output data recorded.    PHYSICAL EXAM:  Skin: Extremities and face reveal no rashes.   HEENT: Sclerae anicteric. Extra-occular muscles are intact. No oral ulcers. No ENT discharge. The neck is supple.   Cardiovascular: Regular rate and rhythm. No murmurs, gallops, or rubs. PMI nondisplaced.   Respiratory:  Clear breath sounds bilaterally with no wheezes, rales, or rhonchi.   GI: Abdomen nondistended, soft, and nontender. Normal active bowel sounds. No enlargement of the liver or spleen. No masses palpable.   Rectal: Deferred   Musculoskeletal: No pitting edema of the lower legs. Extremities have good range of motion. No costovertebral tenderness.   Neurological:  Patient is alert and oriented. Cranial nerves II-XII grossly intact  Psychiatric: Mood appears appropriate with judgement intact.   Lymphatic: No cervical or supraclavicular adenopathy.    Additional comments:I reviewed the patient's new clinical lab test results.      Data Review    No results found for this or any previous visit (from the past 24 hour(s)).      Assessment/Plan:     Principal Problem:    Schizoaffective disorder (HCC)  Resolved Problems:    * No resolved hospital problems. *        Care Plan discussed with: Patient/Family    Total time spent with patient: 20 minutes.    
intake/output data recorded.    PHYSICAL EXAM:  Skin: Extremities and face reveal no rashes.   HEENT: Sclerae anicteric. Extra-occular muscles are intact. No oral ulcers. No ENT discharge. The neck is supple.   Cardiovascular: Regular rate and rhythm. No murmurs, gallops, or rubs. PMI nondisplaced.   Respiratory:  Clear breath sounds bilaterally with no wheezes, rales, or rhonchi.   GI: Abdomen nondistended, soft, and nontender. Normal active bowel sounds. No enlargement of the liver or spleen. No masses palpable.   Rectal: Deferred   Musculoskeletal: No pitting edema of the lower legs. Extremities have good range of motion. No costovertebral tenderness.   Neurological:  Patient is alert and oriented. Cranial nerves II-XII grossly intact  Psychiatric: Mood appears appropriate with judgement intact.   Lymphatic: No cervical or supraclavicular adenopathy.    Additional comments:I reviewed the patient's new clinical lab test results.      Data Review    No results found for this or any previous visit (from the past 24 hour(s)).      Assessment/Plan:     Principal Problem:    Schizoaffective disorder (HCC)  Resolved Problems:    * No resolved hospital problems. *        Care Plan discussed with: Patient/Family    Total time spent with patient: 20 minutes.    
lb)   03/21/24 94.8 kg (209 lb)   07/09/23 97.5 kg (215 lb)     Temp Readings from Last 3 Encounters:   05/24/24 98.7 °F (37.1 °C) (Oral)   03/22/24 98.2 °F (36.8 °C) (Oral)   07/21/23 98.4 °F (36.9 °C) (Oral)     BP Readings from Last 3 Encounters:   05/24/24 125/86   03/22/24 129/87   07/21/23 (!) 126/91     Pulse Readings from Last 3 Encounters:   05/24/24 90   03/22/24 88   07/21/23 82            DATA     LABORATORY DATA:(reviewed/updated 5/24/2024)  No results found for this or any previous visit (from the past 24 hour(s)).   No results found for: \"VALAC\", \"VALP\", \"CARB2\"  No results found for: \"LITHM\"   RADIOLOGY REPORTS:(reviewed/updated 5/24/2024)  No results found.       MEDICATIONS     ALL MEDICATIONS:   Current Facility-Administered Medications   Medication Dose Route Frequency    ziprasidone (GEODON) capsule 40 mg  40 mg Oral BID WC    clonazePAM (KLONOPIN) tablet 1 mg  1 mg Oral Q8H PRN    ziprasidone (GEODON) capsule 20 mg  20 mg Oral Q12H PRN    ziprasidone (GEODON) injection 20 mg  20 mg IntraMUSCular Q12H PRN    acetaminophen (TYLENOL) tablet 650 mg  650 mg Oral Q4H PRN    hydrOXYzine HCl (ATARAX) tablet 50 mg  50 mg Oral TID PRN    traZODone (DESYREL) tablet 50 mg  50 mg Oral Nightly PRN    valproic acid (DEPAKENE) 250 MG/5ML oral solution 500 mg  500 mg Oral BID      SCHEDULED MEDICATIONS:    ziprasidone  40 mg Oral BID WC    valproic acid  500 mg Oral BID           ASSESSMENT & PLAN     Continue close observation  Discussed meds  Provided support, psycho edu  Discussed with staff in the treatment team, the progress made in therapy, psychosocial needs and nursing concerns.  Will focus on medical management  Patient encouraged to attend group therapy  Increased geodan to 40 mg po bid as he continues to present with disorganized thoughts, internal preoccupation    The following regarding medications was addressed during rounds with patient:   the risks and benefits of the proposed medication.   The

## 2024-06-07 NOTE — PLAN OF CARE
Problem: Confusion  Goal: Verbalizes ways to manage anxiety  5/21/2024 2236 by Huey Rawls RN  Outcome: Not Progressing  5/21/2024 0923 by Janay Guerra RN  Outcome: Progressing     Problem: Confusion  Goal: Will report no hallucinations or delusions  Description: INTERVENTIONS:  Outcome: Not Progressing     Problem: Confusion  Goal: Verbalizes ways to manage anxiety  5/21/2024 2236 by Huey Rawls RN  Outcome: Not Progressing  5/21/2024 0923 by Janay Guerra RN  Outcome: Progressing  Goal: Will report no hallucinations or delusions  Description: INTERVENTIONS:  Outcome: Not Progressing     
  Problem: Confusion  Goal: Verbalizes ways to manage anxiety  6/2/2024 0946 by Sho Li RN  Outcome: Progressing  6/1/2024 2021 by Laura Rose LPN  Outcome: Progressing     Problem: Confusion  Goal: Ability to identify and develop effective coping behavior will improve  Description: Ability to identify and develop effective coping behavior will improve  6/2/2024 0946 by Sho Li RN  Outcome: Progressing  6/1/2024 2021 by Laura Rose LPN  Outcome: Progressing     Problem: Confusion  Goal: Able to control aggressive behavior  6/2/2024 0946 by Sho Li RN  Outcome: Progressing  6/1/2024 2021 by Laura Rose LPN  Outcome: Progressing     Problem: Confusion  Goal: Will report no hallucinations or delusions  Description: INTERVENTIONS:  INTERVENTIONS:  1. Administer medication as  ordered  2. Assist with reality testing to support increasing orientation  3. Assess if patient's hallucinations or delusions are encouraging self harm or harm to others and intervene as appropriate  6/2/2024 0946 by Sho Li RN  Outcome: Progressing  6/1/2024 2021 by Laura Rose LPN  Outcome: Progressing     
  Problem: Confusion  Goal: Verbalizes ways to manage anxiety  Outcome: Progressing  Goal: Will report no hallucinations or delusions  Description: INTERVENTIONS:  INTERVENTIONS:  1. Administer medication as  ordered  2. Assist with reality testing to support increasing orientation  3. Assess if patient's hallucinations or delusions are encouraging self harm or harm to others and intervene as appropriate  Outcome: Progressing     Problem: Depression  Goal: Will be euthymic at discharge  Description: INTERVENTIONS:  1. Administer medication as ordered  2. Provide emotional support via 1:1 interaction with staff  3. Encourage involvement in milieu/groups/activities  4. Monitor for social isolation  Outcome: Progressing     Problem: Psychosis  Goal: Will report no hallucinations or delusions  Description: INTERVENTIONS:  INTERVENTIONS:  1. Administer medication as  ordered  2. Assist with reality testing to support increasing orientation  3. Assess if patient's hallucinations or delusions are encouraging self harm or harm to others and intervene as appropriate  Outcome: Progressing     Problem: Behavior  Goal: Pt/Family maintain appropriate behavior and adhere to behavioral management agreement, if implemented  Description: INTERVENTIONS:  1. Assess patient/family's coping skills and  non-compliant behavior (including use of illegal substances)  2. Notify security of behavior or suspected illegal substances which indicate the need for search of the family and/or belongings  3. Encourage verbalization of thoughts and concerns in a socially appropriate manner  4. Utilize positive, consistent limit setting strategies supporting safety of patient, staff and others  5. Encourage participation in the decision making process about the behavioral management agreement  6. If a visitor's behavior poses a threat to safety call refer to organization policy.  7. Initiate consult with , Psychosocial CNS, Spiritual Care 
  Problem: Confusion  Goal: Verbalizes ways to manage anxiety  Outcome: Progressing  Goal: Will report no hallucinations or delusions  Description: INTERVENTIONS:  INTERVENTIONS:  1. Administer medication as  ordered  2. Assist with reality testing to support increasing orientation  3. Assess if patient's hallucinations or delusions are encouraging self harm or harm to others and intervene as appropriate  Outcome: Progressing     Problem: Psychosis  Goal: Will report no hallucinations or delusions  Description: INTERVENTIONS:  INTERVENTIONS:  1. Administer medication as  ordered  2. Assist with reality testing to support increasing orientation  3. Assess if patient's hallucinations or delusions are encouraging self harm or harm to others and intervene as appropriate  Outcome: Progressing     Problem: Behavior  Goal: Pt/Family maintain appropriate behavior and adhere to behavioral management agreement, if implemented  Description: INTERVENTIONS:  1. Assess patient/family's coping skills and  non-compliant behavior (including use of illegal substances)  2. Notify security of behavior or suspected illegal substances which indicate the need for search of the family and/or belongings  3. Encourage verbalization of thoughts and concerns in a socially appropriate manner  4. Utilize positive, consistent limit setting strategies supporting safety of patient, staff and others  5. Encourage participation in the decision making process about the behavioral management agreement  6. If a visitor's behavior poses a threat to safety call refer to organization policy.  7. Initiate consult with , Psychosocial CNS, Spiritual Care as appropriate  Outcome: Progressing     Problem: Anxiety  Goal: Will report anxiety at manageable levels  Description: INTERVENTIONS:  1. Administer medication as ordered  2. Teach and rehearse alternative coping skills  3. Provide emotional support with 1:1 interaction with staff  Outcome: 
  Problem: Depression  Goal: Will be euthymic at discharge  Description: INTERVENTIONS:  1. Administer medication as ordered  2. Provide emotional support via 1:1 interaction with staff  3. Encourage involvement in milieu/groups/activities  4. Monitor for social isolation  Outcome: Progressing     
  Problem: Depression  Goal: Will be euthymic at discharge  Description: INTERVENTIONS:  1. Administer medication as ordered  2. Provide emotional support via 1:1 interaction with staff  3. Encourage involvement in milieu/groups/activities  4. Monitor for social isolation  Outcome: Progressing     Problem: Psychosis  Goal: Will report no hallucinations or delusions  Description: INTERVENTIONS:  INTERVENTIONS:  1. Administer medication as  ordered  2. Assist with reality testing to support increasing orientation  3. Assess if patient's hallucinations or delusions are encouraging self harm or harm to others and intervene as appropriate  Outcome: Progressing     Problem: Behavior  Goal: Pt/Family maintain appropriate behavior and adhere to behavioral management agreement, if implemented  Description: INTERVENTIONS:  1. Assess patient/family's coping skills and  non-compliant behavior (including use of illegal substances)  2. Notify security of behavior or suspected illegal substances which indicate the need for search of the family and/or belongings  3. Encourage verbalization of thoughts and concerns in a socially appropriate manner  4. Utilize positive, consistent limit setting strategies supporting safety of patient, staff and others  5. Encourage participation in the decision making process about the behavioral management agreement  6. If a visitor's behavior poses a threat to safety call refer to organization policy.  7. Initiate consult with , Psychosocial CNS, Spiritual Care as appropriate  5/29/2024 0912 by Bia Aceves, RN  Outcome: Progressing  5/29/2024 0515 by Muriel Ceballos, RN  Outcome: Progressing     
  Problem: Discharge Planning  Goal: Discharge to home or other facility with appropriate resources  5/30/2024 2008 by Laura Rose LPN  Outcome: Progressing  5/30/2024 1018 by Bia Aceves RN  Outcome: Progressing     Problem: Risk for Elopement  Goal: Patient will not exit the unit/facility without proper excort  Outcome: Progressing     Problem: Confusion  Goal: Verbalizes ways to manage anxiety  Outcome: Progressing  Goal: Ability to identify and develop effective coping behavior will improve  Description: Ability to identify and develop effective coping behavior will improve  Outcome: Progressing  Goal: Able to control aggressive behavior  Outcome: Progressing  Goal: Will report no hallucinations or delusions  Description: INTERVENTIONS:  INTERVENTIONS:  1. Administer medication as  ordered  2. Assist with reality testing to support increasing orientation  3. Assess if patient's hallucinations or delusions are encouraging self harm or harm to others and intervene as appropriate  5/30/2024 2008 by Laura Rose LPN  Outcome: Progressing  5/30/2024 1018 by Bia Aceves RN  Outcome: Progressing  Goal: Patient/caregiver will demonstrate understanding of psychosis  Outcome: Progressing     Problem: Depression  Goal: Will be euthymic at discharge  Description: INTERVENTIONS:  1. Administer medication as ordered  2. Provide emotional support via 1:1 interaction with staff  3. Encourage involvement in milieu/groups/activities  4. Monitor for social isolation  5/30/2024 2008 by Laura Rose LPN  Outcome: Progressing  5/30/2024 1018 by Bia Aceves RN  Outcome: Progressing     Problem: Psychosis  Goal: Will report no hallucinations or delusions  Description: INTERVENTIONS:  INTERVENTIONS:  1. Administer medication as  ordered  2. Assist with reality testing to support increasing orientation  3. Assess if patient's hallucinations or delusions are encouraging self harm or harm to others and 
  Problem: Discharge Planning  Goal: Discharge to home or other facility with appropriate resources  6/1/2024 0112 by Ivana Myers RN  Outcome: Progressing     Problem: Risk for Elopement  Goal: Patient will not exit the unit/facility without proper excort  6/1/2024 0950 by Sho Li RN  Outcome: Progressing  6/1/2024 0112 by Ivana Myers RN  Outcome: Progressing     Problem: Confusion  Goal: Verbalizes ways to manage anxiety  6/1/2024 0950 by Sho Li RN  Outcome: Progressing  6/1/2024 0112 by Ivana Myers RN  Outcome: Progressing  Goal: Ability to identify and develop effective coping behavior will improve  Description: Ability to identify and develop effective coping behavior will improve  6/1/2024 0950 by Sho Li RN  Outcome: Progressing  6/1/2024 0112 by Ivana Myers RN  Outcome: Progressing  Goal: Able to control aggressive behavior  6/1/2024 0950 by Sho Li RN  Outcome: Progressing  6/1/2024 0112 by Ivana Myers RN  Outcome: Progressing  Goal: Will report no hallucinations or delusions  Description: INTERVENTIONS:  INTERVENTIONS:  1. Administer medication as  ordered  2. Assist with reality testing to support increasing orientation  3. Assess if patient's hallucinations or delusions are encouraging self harm or harm to others and intervene as appropriate  6/1/2024 0950 by Sho Li RN  Outcome: Progressing  6/1/2024 0112 by Ivana Myers RN  Outcome: Progressing  Goal: Patient/caregiver will demonstrate understanding of psychosis  6/1/2024 0950 by Sho Li RN  Outcome: Progressing  6/1/2024 0112 by Ivana Myers RN  Outcome: Progressing     Problem: Depression  Goal: Will be euthymic at discharge  Description: INTERVENTIONS:  1. Administer medication as ordered  2. Provide emotional support via 1:1 interaction with staff  3. Encourage involvement in milieu/groups/activities  4. Monitor for social 
  Problem: Discharge Planning  Goal: Discharge to home or other facility with appropriate resources  Outcome: Progressing     Problem: Confusion  Goal: Verbalizes ways to manage anxiety  5/22/2024 1005 by Bia Aceves RN  Outcome: Progressing  5/21/2024 2236 by Huey Rawls RN  Outcome: Not Progressing  Goal: Will report no hallucinations or delusions  Description: INTERVENTIONS:  5/21/2024 2236 by Huey Rawls RN  Outcome: Not Progressing     Problem: Confusion  Goal: Verbalizes ways to manage anxiety  5/22/2024 1005 by Bia Aceves RN  Outcome: Progressing  5/21/2024 2236 by Huey Rawls RN  Outcome: Not Progressing  Goal: Will report no hallucinations or delusions  Description: INTERVENTIONS:  5/21/2024 2236 by Huey Rawls RN  Outcome: Not Progressing     
  Problem: Discharge Planning  Goal: Discharge to home or other facility with appropriate resources  Outcome: Progressing     Problem: Risk for Elopement  Goal: Patient will not exit the unit/facility without proper excort  5/27/2024 2119 by Laura Rose LPN  Outcome: Progressing  5/27/2024 1046 by Sho Li RN  Outcome: Progressing     Problem: Confusion  Goal: Verbalizes ways to manage anxiety  Outcome: Progressing  Goal: Ability to identify and develop effective coping behavior will improve  Description: Ability to identify and develop effective coping behavior will improve  Outcome: Progressing  Goal: Able to control aggressive behavior  5/27/2024 2119 by Laura Rose LPN  Outcome: Progressing  5/27/2024 1046 by Sho Li RN  Outcome: Progressing  Goal: Will report no hallucinations or delusions  Description: INTERVENTIONS:  INTERVENTIONS:  1. Administer medication as  ordered  2. Assist with reality testing to support increasing orientation  3. Assess if patient's hallucinations or delusions are encouraging self harm or harm to others and intervene as appropriate  5/27/2024 2119 by Laura Rose LPN  Outcome: Progressing  5/27/2024 1046 by Sho Li RN  Outcome: Progressing  Goal: Patient/caregiver will demonstrate understanding of psychosis  Outcome: Progressing     Problem: Depression  Goal: Will be euthymic at discharge  Description: INTERVENTIONS:  1. Administer medication as ordered  2. Provide emotional support via 1:1 interaction with staff  3. Encourage involvement in milieu/groups/activities  4. Monitor for social isolation  Outcome: Progressing     Problem: Psychosis  Goal: Will report no hallucinations or delusions  Description: INTERVENTIONS:  INTERVENTIONS:  1. Administer medication as  ordered  2. Assist with reality testing to support increasing orientation  3. Assess if patient's hallucinations or delusions are encouraging self harm or harm to others 
  Problem: Discharge Planning  Goal: Discharge to home or other facility with appropriate resources  Outcome: Progressing     Problem: Risk for Elopement  Goal: Patient will not exit the unit/facility without proper excort  6/1/2024 2021 by Laura Rose LPN  Outcome: Progressing  6/1/2024 0950 by Sho Li RN  Outcome: Progressing     Problem: Confusion  Goal: Verbalizes ways to manage anxiety  6/1/2024 2021 by Laura Rose LPN  Outcome: Progressing  6/1/2024 0950 by Sho Li RN  Outcome: Progressing  Goal: Ability to identify and develop effective coping behavior will improve  Description: Ability to identify and develop effective coping behavior will improve  6/1/2024 2021 by Laura Rose LPN  Outcome: Progressing  6/1/2024 0950 by Sho Li RN  Outcome: Progressing  Goal: Able to control aggressive behavior  6/1/2024 2021 by Laura Rose LPN  Outcome: Progressing  6/1/2024 0950 by Sho Li RN  Outcome: Progressing  Goal: Will report no hallucinations or delusions  Description: INTERVENTIONS:  INTERVENTIONS:  1. Administer medication as  ordered  2. Assist with reality testing to support increasing orientation  3. Assess if patient's hallucinations or delusions are encouraging self harm or harm to others and intervene as appropriate  6/1/2024 2021 by Laura Rose LPN  Outcome: Progressing  6/1/2024 0950 by Sho Li RN  Outcome: Progressing  Goal: Patient/caregiver will demonstrate understanding of psychosis  6/1/2024 2021 by Laura Rose LPN  Outcome: Progressing  6/1/2024 0950 by Sho Li RN  Outcome: Progressing     Problem: Depression  Goal: Will be euthymic at discharge  Description: INTERVENTIONS:  1. Administer medication as ordered  2. Provide emotional support via 1:1 interaction with staff  3. Encourage involvement in milieu/groups/activities  4. Monitor for social isolation  Outcome: Progressing     Problem: 
  Problem: Discharge Planning  Goal: Discharge to home or other facility with appropriate resources  Outcome: Progressing     Problem: Risk for Elopement  Goal: Patient will not exit the unit/facility without proper excort  Outcome: Progressing     Problem: Confusion  Goal: Verbalizes ways to manage anxiety  6/2/2024 1957 by Laura Rose LPN  Outcome: Progressing  6/2/2024 0946 by Sho Li RN  Outcome: Progressing  Goal: Ability to identify and develop effective coping behavior will improve  Description: Ability to identify and develop effective coping behavior will improve  6/2/2024 1957 by Laura Rose LPN  Outcome: Progressing  6/2/2024 0946 by Sho Li RN  Outcome: Progressing  Goal: Able to control aggressive behavior  6/2/2024 1957 by Laura Rose LPN  Outcome: Progressing  6/2/2024 0946 by Sho Li RN  Outcome: Progressing  Goal: Will report no hallucinations or delusions  Description: INTERVENTIONS:  INTERVENTIONS:  1. Administer medication as  ordered  2. Assist with reality testing to support increasing orientation  3. Assess if patient's hallucinations or delusions are encouraging self harm or harm to others and intervene as appropriate  6/2/2024 1957 by Laura Rose LPN  Outcome: Progressing  6/2/2024 0946 by Sho Li RN  Outcome: Progressing  Goal: Patient/caregiver will demonstrate understanding of psychosis  Outcome: Progressing     Problem: Depression  Goal: Will be euthymic at discharge  Description: INTERVENTIONS:  1. Administer medication as ordered  2. Provide emotional support via 1:1 interaction with staff  3. Encourage involvement in milieu/groups/activities  4. Monitor for social isolation  Outcome: Progressing     Problem: Psychosis  Goal: Will report no hallucinations or delusions  Description: INTERVENTIONS:  INTERVENTIONS:  1. Administer medication as  ordered  2. Assist with reality testing to support increasing 
  Problem: Discharge Planning  Goal: Discharge to home or other facility with appropriate resources  Outcome: Progressing     Problem: Risk for Elopement  Goal: Patient will not exit the unit/facility without proper excort  Outcome: Progressing     Problem: Confusion  Goal: Verbalizes ways to manage anxiety  6/5/2024 1954 by Laura Rose LPN  Outcome: Progressing  6/5/2024 1027 by Ann Marie Billy LPN  Outcome: Progressing  Goal: Ability to identify and develop effective coping behavior will improve  Description: Ability to identify and develop effective coping behavior will improve  6/5/2024 1954 by Laura Rose LPN  Outcome: Progressing  6/5/2024 1027 by Ann Marie Billy LPN  Outcome: Progressing  Goal: Able to control aggressive behavior  6/5/2024 1954 by Laura Rose LPN  Outcome: Progressing  6/5/2024 1027 by Ann Marie Billy LPN  Outcome: Progressing  Goal: Will report no hallucinations or delusions  Description: INTERVENTIONS:  INTERVENTIONS:  1. Administer medication as  ordered  2. Assist with reality testing to support increasing orientation  3. Assess if patient's hallucinations or delusions are encouraging self harm or harm to others and intervene as appropriate  6/5/2024 1954 by Laura Rose LPN  Outcome: Progressing  6/5/2024 1027 by Ann Marie Billy LPN  Outcome: Progressing  Goal: Patient/caregiver will demonstrate understanding of psychosis  Outcome: Progressing     Problem: Depression  Goal: Will be euthymic at discharge  Description: INTERVENTIONS:  1. Administer medication as ordered  2. Provide emotional support via 1:1 interaction with staff  3. Encourage involvement in milieu/groups/activities  4. Monitor for social isolation  Outcome: Progressing     Problem: Psychosis  Goal: Will report no hallucinations or delusions  Description: INTERVENTIONS:  INTERVENTIONS:  1. Administer medication as  ordered  2. Assist with reality testing to support increasing orientation  3. 
  Problem: Discharge Planning  Goal: Discharge to home or other facility with appropriate resources  Outcome: Progressing     Problem: Risk for Elopement  Goal: Patient will not exit the unit/facility without proper excort  Outcome: Progressing     Problem: Confusion  Goal: Verbalizes ways to manage anxiety  Outcome: Progressing  Goal: Ability to identify and develop effective coping behavior will improve  Description: Ability to identify and develop effective coping behavior will improve  Outcome: Progressing  Goal: Able to control aggressive behavior  Outcome: Progressing  Goal: Will report no hallucinations or delusions  Description: INTERVENTIONS:  INTERVENTIONS:  1. Administer medication as  ordered  2. Assist with reality testing to support increasing orientation  3. Assess if patient's hallucinations or delusions are encouraging self harm or harm to others and intervene as appropriate  Outcome: Progressing  Goal: Patient/caregiver will demonstrate understanding of psychosis  Outcome: Progressing     Problem: Depression  Goal: Will be euthymic at discharge  Description: INTERVENTIONS:  1. Administer medication as ordered  2. Provide emotional support via 1:1 interaction with staff  3. Encourage involvement in milieu/groups/activities  4. Monitor for social isolation  Outcome: Progressing     Problem: Psychosis  Goal: Will report no hallucinations or delusions  Description: INTERVENTIONS:  INTERVENTIONS:  1. Administer medication as  ordered  2. Assist with reality testing to support increasing orientation  3. Assess if patient's hallucinations or delusions are encouraging self harm or harm to others and intervene as appropriate  Outcome: Progressing     Problem: Behavior  Goal: Pt/Family maintain appropriate behavior and adhere to behavioral management agreement, if implemented  Description: INTERVENTIONS:  1. Assess patient/family's coping skills and  non-compliant behavior (including use of illegal 
  Problem: Psychosis  Goal: Will report no hallucinations or delusions  Description: INTERVENTIONS:  INTERVENTIONS:  1. Administer medication as  ordered  2. Assist with reality testing to support increasing orientation  3. Assess if patient's hallucinations or delusions are encouraging self harm or harm to others and intervene as appropriate  Outcome: Progressing     Problem: Confusion  Goal: Able to control aggressive behavior  Outcome: Progressing     
  Problem: Risk for Elopement  Goal: Patient will not exit the unit/facility without proper excort  5/24/2024 2251 by Muriel Ceballos, RN  Outcome: Progressing  5/24/2024 0854 by Janay Guerra, RN  Outcome: Progressing    --No attempt to exit the unit.      
  Problem: Risk for Elopement  Goal: Patient will not exit the unit/facility without proper excort  5/26/2024 1113 by Bia Aceves, RN  Outcome: Progressing  5/25/2024 2203 by Muriel Ceballos RN  Outcome: Progressing     Problem: Confusion  Goal: Verbalizes ways to manage anxiety  Outcome: Progressing  Goal: Will report no hallucinations or delusions  Description: INTERVENTIONS:  INTERVENTIONS:  1. Administer medication as  ordered  2. Assist with reality testing to support increasing orientation  3. Assess if patient's hallucinations or delusions are encouraging self harm or harm to others and intervene as appropriate  Outcome: Progressing     Problem: Depression  Goal: Will be euthymic at discharge  Description: INTERVENTIONS:  1. Administer medication as ordered  2. Provide emotional support via 1:1 interaction with staff  3. Encourage involvement in milieu/groups/activities  4. Monitor for social isolation  Outcome: Progressing     Problem: Psychosis  Goal: Will report no hallucinations or delusions  Description: INTERVENTIONS:  INTERVENTIONS:  1. Administer medication as  ordered  2. Assist with reality testing to support increasing orientation  3. Assess if patient's hallucinations or delusions are encouraging self harm or harm to others and intervene as appropriate  Outcome: Progressing     
  Problem: Risk for Elopement  Goal: Patient will not exit the unit/facility without proper excort  5/26/2024 2159 by Muriel Ceballos RN  Outcome: Progressing  5/26/2024 1113 by Bia Aceves RN  Outcome: Progressing     Problem: Confusion  Goal: Verbalizes ways to manage anxiety  5/26/2024 1113 by Bia Aceves RN  Outcome: Progressing     Problem: Confusion  Goal: Able to control aggressive behavior  Outcome: Progressing     Problem: Anxiety  Goal: Will report anxiety at manageable levels  Description: INTERVENTIONS:  1. Administer medication as ordered  2. Teach and rehearse alternative coping skills  3. Provide emotional support with 1:1 interaction with staff  Outcome: Progressing    - no attempt to exit the unit.  -pt resting quietly in bed.  -no aggressive behavior noted or reported.  -pt resting quietly in bed; no distress noted or voiced.     
  Problem: Risk for Elopement  Goal: Patient will not exit the unit/facility without proper excort  5/27/2024 1046 by Sho Li RN  Outcome: Progressing  5/26/2024 2159 by Muriel Ceballos RN  Outcome: Progressing     Problem: Confusion  Goal: Able to control aggressive behavior  5/27/2024 1046 by Sho Li RN  Outcome: Progressing  5/26/2024 2159 by Muriel Ceballos RN  Outcome: Progressing     Problem: Confusion  Goal: Will report no hallucinations or delusions  Description: INTERVENTIONS:  INTERVENTIONS:  1. Administer medication as  ordered  2. Assist with reality testing to support increasing orientation  3. Assess if patient's hallucinations or delusions are encouraging self harm or harm to others and intervene as appropriate  Outcome: Progressing     Problem: Behavior  Goal: Pt/Family maintain appropriate behavior and adhere to behavioral management agreement, if implemented  Description: INTERVENTIONS:  1. Assess patient/family's coping skills and  non-compliant behavior (including use of illegal substances)  2. Notify security of behavior or suspected illegal substances which indicate the need for search of the family and/or belongings  3. Encourage verbalization of thoughts and concerns in a socially appropriate manner  4. Utilize positive, consistent limit setting strategies supporting safety of patient, staff and others  5. Encourage participation in the decision making process about the behavioral management agreement  6. If a visitor's behavior poses a threat to safety call refer to organization policy.  7. Initiate consult with , Psychosocial CNS, Spiritual Care as appropriate  5/27/2024 1046 by Sho Li RN  Outcome: Progressing  5/26/2024 2159 by Muriel Ceballos RN  Outcome: Progressing     
  Problem: Risk for Elopement  Goal: Patient will not exit the unit/facility without proper excort  Outcome: Progressing     
  Problem: Risk for Elopement  Goal: Patient will not exit the unit/facility without proper excort  Outcome: Progressing     - no attempt to exit the unit; pt has been resting quietly in bed.  
  Problem: Risk for Elopement  Goal: Patient will not exit the unit/facility without proper excort  Outcome: Progressing     Problem: Confusion  Goal: Able to control aggressive behavior  Outcome: Progressing     
  Problem: Risk for Elopement  Goal: Patient will not exit the unit/facility without proper excort  Outcome: Progressing     Problem: Confusion  Goal: Able to control aggressive behavior  Outcome: Progressing     Problem: Behavior  Goal: Pt/Family maintain appropriate behavior and adhere to behavioral management agreement, if implemented  Description: INTERVENTIONS:  1. Assess patient/family's coping skills and  non-compliant behavior (including use of illegal substances)  2. Notify security of behavior or suspected illegal substances which indicate the need for search of the family and/or belongings  3. Encourage verbalization of thoughts and concerns in a socially appropriate manner  4. Utilize positive, consistent limit setting strategies supporting safety of patient, staff and others  5. Encourage participation in the decision making process about the behavioral management agreement  6. If a visitor's behavior poses a threat to safety call refer to organization policy.  7. Initiate consult with , Psychosocial CNS, Spiritual Care as appropriate  Outcome: Progressing     Problem: Anxiety  Goal: Will report anxiety at manageable levels  Description: INTERVENTIONS:  1. Administer medication as ordered  2. Teach and rehearse alternative coping skills  3. Provide emotional support with 1:1 interaction with staff  Outcome: Progressing     Problem: Sleep Disturbance  Goal: Will exhibit normal sleeping pattern  Description: INTERVENTIONS:  1. Administer medication as ordered  2. Decrease environmental stimuli, including noise, as appropriate  3. Discourage social isolation and naps during the day  Outcome: Progressing    -no attempt to exit the unit.  -no aggressive behavior noted or reported.  -pt has been calm and quiet.  -pt anxiety voiced; pt resting quietly in bed and attended group.  -pt has been resting quietly in bed without any distress.     
  Problem: Risk for Elopement  Goal: Patient will not exit the unit/facility without proper excort  Outcome: Progressing     Problem: Confusion  Goal: Able to control aggressive behavior  Outcome: Progressing    -no attempt to leave the unit.  -no aggressive behavior     
  Problem: Risk for Elopement  Goal: Patient will not exit the unit/facility without proper excort  Recent Flowsheet Documentation  Taken 5/20/2024 1300 by Zenon Mccloud RN  Nursing Interventions for Elopement Risk: Assist with personal care needs such as toileting, eating, dressing, as needed to reduce the risk of wandering     Problem: Confusion  Goal: Able to control aggressive behavior  Outcome: Progressing     Problem: Confusion  Goal: Verbalizes ways to manage anxiety  Outcome: Not Progressing     
Monitor for social isolation  6/6/2024 2000 by Laura Rose LPN  Outcome: Progressing  6/6/2024 0958 by Janay Guerra RN  Outcome: Progressing     Problem: Psychosis  Goal: Will report no hallucinations or delusions  Description: INTERVENTIONS:  INTERVENTIONS:  1. Administer medication as  ordered  2. Assist with reality testing to support increasing orientation  3. Assess if patient's hallucinations or delusions are encouraging self harm or harm to others and intervene as appropriate  6/6/2024 2000 by Laura Rose LPN  Outcome: Progressing  6/6/2024 0958 by Janay Guerra RN  Outcome: Progressing     Problem: Behavior  Goal: Pt/Family maintain appropriate behavior and adhere to behavioral management agreement, if implemented  Description: INTERVENTIONS:  1. Assess patient/family's coping skills and  non-compliant behavior (including use of illegal substances)  2. Notify security of behavior or suspected illegal substances which indicate the need for search of the family and/or belongings  3. Encourage verbalization of thoughts and concerns in a socially appropriate manner  4. Utilize positive, consistent limit setting strategies supporting safety of patient, staff and others  5. Encourage participation in the decision making process about the behavioral management agreement  6. If a visitor's behavior poses a threat to safety call refer to organization policy.  7. Initiate consult with , Psychosocial CNS, Spiritual Care as appropriate  6/6/2024 2000 by Laura Rose LPN  Outcome: Progressing  6/6/2024 0958 by Janay Guerra RN  Outcome: Progressing     Problem: Anxiety  Goal: Will report anxiety at manageable levels  Description: INTERVENTIONS:  1. Administer medication as ordered  2. Teach and rehearse alternative coping skills  3. Provide emotional support with 1:1 interaction with staff  6/6/2024 2000 by Laura Rose LPN  Outcome: Progressing  6/6/2024 0958 by Janay Guerra 
Progressing     Problem: Psychosis  Goal: Will report no hallucinations or delusions  Description: INTERVENTIONS:  INTERVENTIONS:  1. Administer medication as  ordered  2. Assist with reality testing to support increasing orientation  3. Assess if patient's hallucinations or delusions are encouraging self harm or harm to others and intervene as appropriate  5/28/2024 1100 by Sho Li RN  Outcome: Progressing  5/27/2024 2119 by Laura Rose LPN  Outcome: Progressing     Problem: Behavior  Goal: Pt/Family maintain appropriate behavior and adhere to behavioral management agreement, if implemented  Description: INTERVENTIONS:  1. Assess patient/family's coping skills and  non-compliant behavior (including use of illegal substances)  2. Notify security of behavior or suspected illegal substances which indicate the need for search of the family and/or belongings  3. Encourage verbalization of thoughts and concerns in a socially appropriate manner  4. Utilize positive, consistent limit setting strategies supporting safety of patient, staff and others  5. Encourage participation in the decision making process about the behavioral management agreement  6. If a visitor's behavior poses a threat to safety call refer to organization policy.  7. Initiate consult with , Psychosocial CNS, Spiritual Care as appropriate  5/27/2024 2119 by Laura Rose LPN  Outcome: Progressing     Problem: Anxiety  Goal: Will report anxiety at manageable levels  Description: INTERVENTIONS:  1. Administer medication as ordered  2. Teach and rehearse alternative coping skills  3. Provide emotional support with 1:1 interaction with staff  5/28/2024 1100 by Sho Li RN  Outcome: Progressing  5/27/2024 2119 by Laura Rose LPN  Outcome: Progressing     Problem: Sleep Disturbance  Goal: Will exhibit normal sleeping pattern  Description: INTERVENTIONS:  1. Administer medication as ordered  2. Decrease 
Progressing  6/4/2024 0929 by Jackie Major, RN  Outcome: Progressing     Problem: Behavior  Goal: Pt/Family maintain appropriate behavior and adhere to behavioral management agreement, if implemented  Description: INTERVENTIONS:  1. Assess patient/family's coping skills and  non-compliant behavior (including use of illegal substances)  2. Notify security of behavior or suspected illegal substances which indicate the need for search of the family and/or belongings  3. Encourage verbalization of thoughts and concerns in a socially appropriate manner  4. Utilize positive, consistent limit setting strategies supporting safety of patient, staff and others  5. Encourage participation in the decision making process about the behavioral management agreement  6. If a visitor's behavior poses a threat to safety call refer to organization policy.  7. Initiate consult with , Psychosocial CNS, Spiritual Care as appropriate  Outcome: Progressing     Problem: Anxiety  Goal: Will report anxiety at manageable levels  Description: INTERVENTIONS:  1. Administer medication as ordered  2. Teach and rehearse alternative coping skills  3. Provide emotional support with 1:1 interaction with staff  Outcome: Progressing     Problem: Sleep Disturbance  Goal: Will exhibit normal sleeping pattern  Description: INTERVENTIONS:  1. Administer medication as ordered  2. Decrease environmental stimuli, including noise, as appropriate  3. Discourage social isolation and naps during the day  Outcome: Progressing     Problem: Self Care Deficit  Goal: Return ADL status to a safe level of function  Description: INTERVENTIONS:  1. Administer medication as ordered  2. Assess ADL deficits and provide assistive devices as needed  3. Obtain PT/OT consults as needed  4. Assist and instruct patient to increase activity and self care as tolerated  Outcome: Progressing

## 2024-06-07 NOTE — BH NOTE
Affect restricted. Calm/cooperative. Some eye to eye contact. Cont to spend the majority of x, in his room. Pt went to the day room, @ meals x; consumed 3/4. Compliant with scheduled meds. Pt did not attend groups, although encouraged. No attempts to self harm; Q 15 mins checks maintained. Pt is scheduled to have a Depakote level in a m.      
Alert and oriented to person and place. Affect and mood are irritable and guarded. Thought processes continue to be paranoid and suspicious. Denies SI/HI, stating \"I am not alive so how could I kill myself.\" Took PO medications this morning after several prompts and notification of TEODORO IM haldol. Attended group, continues to isolate to his room. Ate lunch in day room. Encouraged to shower and tend to personal hygiene.   
Alert, oriented to person and place. Affect and mood continues to be flat and guarded. Denies SI/HI/AVH at this time. Medication and meal compliant. No behavioral issues noted. Pt encouraged to attend groups and increase interaction with peers on unit.   
Alert, oriented to person and place. Pt sitting on side of bed naked. Was compliant when asked to put on gown so writer could enter room. Affect and mood are flat and constricted. Denies SI/HI at this time. Thought blocking noted. Unable to engage in conversation with writer, kept saying \"I messed it up.\" Took liquid Depakote with significant prompting. Took Geodon by mouth, but then spit out into cup. Spoke with Dr. Martinez regarding possible need for TEODORO.   
Alert, oriented x 4. Affect and mood remains flat and depressed. Denies SI/HI/AVH. Took scheduled PO medications without issue. Continues to isolate in his room. Up to dayroom for breakfast, declined morning groups.   
Awake briefly at onset of shift. Refused vitals stating, \"I don't need them because I am not real.\"  Thought blocking noted, pt asked writer to leave his room.  Ate some of his breakfast, before going back to sleep.   
Behavioral Health Treatment Team Note     Patient goal(s) for today: None voiced  Treatment team focus/goals: Medication management, group therapy, discharge planning    Progress note: Pt became concerned when a pt was being treated during a code [place]. He stated, \"Let her go!\" A few times and was difficult to redirect but ultimately agreed to stay in the group room with his peers. When writer addressed him by name he was guarded stating, \"How do you know my name? I don't know you.\" Writer introduced herself and explained her role. Pt was receptive. An inpatient level of care is needed to further stabilize pt on medication.     LOS:  7  Expected LOS: TDO expires 6/5/20204      Insurance info/prescription coverage:  [facility]     Date of last family contact:  5/24/24 Liana spoke with aunt      Family requesting physician contact today:  No  Discharge plan:  to stabilize pt   Guns in the home:  No   Outpatient provider(s):  will be established prior to dc     Participating treatment team members: QUINN Guido MSW  
Behavioral Health Treatment Team Note     Patient goal(s) for today: \"I don't know right now\"  Treatment team focus/goals: meds management, dc planning, group therapy     Progress note: Pt was seen in his room as he was sitting on the side of his bed looking at a stack of papers. Pt presented to be open to this writer's approach. Pt presented to be calm, guarded, suspicious, provided some eye contact, with a flat affect, somewhat tangential, delusional. Pt denied current hi. When this writer inquired about ah/vh, pt stated \"things are all messed up in my head right now\" pt did not expand further. When this writer inquired about si, pt stated \"I'm always towards myself because I want to help others and so I want to eliminate the problem\" pt did not expand any further. u rn/lucia made aware of the above comment. When this writer inquired how the pt was doing, he stated \"I am compliant, I am a different type of patient, I change with my environment. This writer asked what brought him to the hospital and the pt replied, \"things went wrong, I was coerced to get help. I see stuff and hear things, there is too much stuff going on.\" This writer asked the pt why does he become non compliant with meds after dc and he stated \"at first meds are good then they start to go more harm by making me agitated and angry so I stop meds I let things go and it gets worse and the real [place] starts, you know what I really think, well they are my thoughts and my opinions, I think too much, I think I'm good but I'm a looser, I don't know what's right, things are all scattered.\" This writer informed pt to have a good day and take care of yourself while walking out and the pt stated \"I'm not good at taking are of myself, I'm like a baby, I don't know what's true, I don't deserve it\" Pt cont to meet criteria for inpt stay for further stabilization.    LOS:  4 Expected LOS:  Expires: 6/5/20204     Insurance info/prescription coverage:  [employer] 
Behavioral Health Treatment Team Note     Patient goal(s) for today: \"I don't know\"  Treatment team focus/goals:  Medication management, group therapy, discharge planning     Progress note: Pt was seen in his room as he was walking around with the blinds closed. Pt presented to be open to this writer's approach. Pt presented to be somewhat anxious, guarded, suspicious, provided some eye contact, with a flat affect, somewhat tangential, delusional, with rambling speech. Pt denied current hi/si. When this writer inquired about presence of ah/vh, pt replied \"I don't know, I just feel like I let it go to far.\" This write asked pt to expand further on the above comment and he kept saying \"I don't know, I don't know.\" This writer asked pt how he was doing, to which the pt replied \"I'm delusional, I have no perception, no sense of time, nothing is making sense right now, there is just a lot of over thinking going on, I don't know if it is my fault or not, I have irrational thinking.\" Pt kept rambling and was unable to provide answers to probing questions, which might be due to presence of paranoia. This write asked pt if he had spoke to his aunt over the weekend, to which he replied \"yeah, I asked her for a blood test, I don't think we are related, I'm not myself.\" No aggression/agitation noted. Pt cont to meet criteria for inpt stay for further stabilization through meds management.     LOS:  8 Expected LOS:  TDO expires 6/5/20204     Insurance info/prescription coverage:  [facility]   Date of last family contact:  ms luis fernando/aunt , reached out, she stated \"my  talked to him, he has not talked to me at all, he did ok when he first started talking to my  but then went into being delusional, blaming himself for things that he did not do, he did not seem to have a concrete conversation, thinking he is a failure, he told his grandmother that he is not related to us, he still is 
Behavioral Health Treatment Team Note     Patient goal(s) for today: \"I don't know\"  Treatment team focus/goals: Medication management, group therapy, discharge planning     Progress note: Pt was seen in his room as he was sitting on the side of his bed looking at the floor. Pt presented to be alert, oriented, calm, guarded, tangential, providing some eye contact with a flat affect, still rambling but not observed whispering and mumbling to self during this interaction, able to formulate a sentence but jumping from topic to topic. Pt denied current si/hi/ah/vh/depression/anxiety. Pt stated \"I am doing 30% better today, it is still hard to decide bad vs good, that's not my job to decided, to each is own, it's not my job to find a fault, it's like going in circles, people believe in one God, two God, three God, four God, who really is the right rosa? Who has the right to decide, to actually tell you I'm much more of an under influencer, you see all these people on [place] and they are influencers and I actually get influenced easily\" Pt was asked if he slept well last night, to which he stated \"I slept for like an hr or two but then I had weird dreams and I get distracted easily.\" Pt was asked to complete the OQ survey at some point this weekend to which he stated with a stressed/distressed look on his face \"no no no, you are asking me to make decisions and I'm not good at that, please no no\" this writer informed the pt that he did not need to complete OQ this weekend. Pt informed that staff from [facility] will be arriving at some point Monday to do an intake so that he could enroll in ACT services after discharge, to which the pt just nodded his head. Pt cont to meet criteria for inpt stay for further stabilization through meds management.       LOS:  11 Expected LOS: TDO expires 6/5/20204     Insurance info/prescription coverage:  [facility]   Date of last family contact:  ms gould/aunt , 
Behavioral Health Treatment Team Note     Patient goal(s) for today: \"I don't know\"  Treatment team focus/goals: medication management, dc planning, group therapy    Progress note: Pt was seen in his room as he was walking around with the blinds closed. Pt presented as alert, calm, with a flat affect and actively responding to internal stimuli and preoccupied with his own thoughts, whispering to himself while trying to engage with this writer. Pt admitted to experiencing ah adding \"I am hearing my mind thoughts, it's overwhelming\" pt presented to be in emotional distress while making the above statement. Pt was observed looking around in the room while engaging with this writer, when asked if he was experiencing vh, he stated \"it's just everything, everywhere, my mind is obscure right now\" pt denied current si/hi. Pt cont to meet criteria for intp stay for further stabilization through meds management.     LOS:  2 Expected LOS: Expires: 6/5/20204     Insurance info/prescription coverage:  [facility]   Date of last family contact:  see below  Family requesting physician contact today:  No  Discharge plan:  to stabilize pt  Guns in the home:  No   Outpatient provider(s):  will be established prior to dc    Collateral: Writer was given permission by attending psyc provider to speak with the pts family without a ANDRÉS because of pt being placed under a TDO. It is important to gain collateral information in order to provide appropriate and safe treatment goals, to ensure the best quality of care for the pt.   This writer reached out to ms luis fernando/aunt , who stated \"he is there because he is delusional, non compliant with his meds for several years, he has been on a long acting for a while and when stopped he crash, he is paranoid, he is isolative, he is in denial of his mental health, he believes everything on utube, he has been verbally aggressive with grandmother before and he lives with 
Behavioral Health Treatment Team Note     Patient goal(s) for today: \"catch up on sleep\"  Treatment team focus/goals: Medication management, group therapy, discharge planning     Progress note: Pt was seen in his room as he was resting. Pt woke up easily and presented to be open to this writer's approach. Pt presented to be alert, oriented, calm, guarded, open to this writer's approach, with a flat affect. Pt denied current si/hi/ah/vh. Pt stated he was \"doing ok.\" Pt did not voice any concerns/complaints but calmly asked this writer that he would not like to engage any further but would like to catch up on his sleep. No aggression/agitation noted.      LOS:  15  Expected LOS: TDO expires 6/5/20204     Insurance info/prescription coverage:   [facility]   Date of last family contact:  ms gould/aunt , reached out 6/3  Family requesting physician contact today:  Yes, attending psyc provide has been made aware of this request  Discharge plan:  to stabilize pt  Guns in the home:  No   Outpatient provider(s):   ACT with [facility]     Participating treatment team members: Art Rainey, * (assigned SW), Liana Dwyer, MS  
Behavioral Health Treatment Team Note     Patient goal(s) for today: \"go home'  Treatment team focus/goals:  Medication management, group therapy, discharge planning     Progress note:  Pt was seen in his room as he was resting with the blanket over his head which he took off when this writer knocked on the door. Pt woke up easily and presented to be open to this writer's approach. Pt presented to be alert, oriented, calm, with a flat affect and engaging in conversation and provided appropriate eye contact. Pt denied current si/hi/ah/vh. Pt stated he was \"doing the best I can, feeling better for the most part.\" Pt stated he slept 'ok.\" Pt inquired about his discharge adding \"I just came here to get myself together and give my grandma a break because she was going through a lot of stuff but I am ready to go back home.\" Pt informed that he will have a better idea of his projected dc date in the morning, to which the pt calmly voiced understanding. Pt did not voice any further concerns/complaints. Pt cont to calmly declined alternative placement to a step down csu adding \"I have a home to go to, I want to be discharged home.\" This writer voiced understanding. Pt cont to meet criteria for inpt stay for further stabilization through meds management.      LOS:  17 Expected LOS: TDO expires 6/5/20204     Insurance info/prescription coverage:  [facility]   Date of last family contact:  ms gould/aunt , reached out, no ans, left vm informing her of the projected dc date of tomorrow with a fu with act team with [facility], contact info for act staff ney ALEJANDRO [phone] left on vm.   Family requesting physician contact today:  No  Discharge plan:  to stabilize pt   Guns in the home:  No   Outpatient provider(s):  ACT with [facility]     Participating treatment team members: Art Rainey, * (assigned SW), Liana Dwyer, MS  
Behavioral Health Treatment Team Note     Patient goal(s) for today: \"sleep\"  Treatment team focus/goals: Medication management, group therapy, discharge planning     Progress note:  Pt was seen in his room as he was resting with the blanket over his head which he took off when this writer knocked on the door. Pt woke up easily and presented to be open to this writer's approach. Pt presented to be alert, oriented, calm, with a flat affect and engaging in conversation. Pt denied current si/hi/ah/vh. Pt stated he was \"doing ok.\" This writer discussed the option of csu placement after discharge to which the pt calmly stated 'i want to go home, I don't want to go anywhere else, I am looking forward to enrolling in the ACT services, how will that work if I go somewhere else.\" Pt requested to be discharged to [facility] [place] after discharge and calmly declined csu placement. Pt stated that he \"got stressed out and that is why I came to the hospital, my family thought it would be best for me to be here and get help, it was my family recommendation and after that I want to go home.\" Pt did not voice further concerns. Pt cont to meet criteria for inpt stay for further stabilization through meds management.     LOS:  16 Expected LOS: TDO expires 6/5/20204     Insurance info/prescription coverage:  [facility]   Date of last family contact:   ms gould/aunt , reached out to discuss dc planning, no ans, will fu at a later time   Family requesting physician contact today:  No  Discharge plan:   to stabilize pt   Guns in the home:  No   Outpatient provider(s):  ACT with [facility]     Participating treatment team members: Art Rainey, * (assigned SW), Liana Dwyer, MS  
Behavioral Health Treatment Team Note     Patient goal(s) for today: \"stay calm\"  Treatment team focus/goals:  Medication management, group therapy, discharge planning     Progress note: Pt was seen in his room as he was sitting on the side of his bed consuming his snack.  Pt presented to be somewhat anxious, guarded, suspicious, provided some eye contact, with a flat affect, somewhat tangential, delusional, with rambling speech, whispering and mumbling to self while engaging in conversation with this writer. pt denied current si/hi/ah/vh adding \"I haven't felt any of that lately.\" When this writer asked pt how he was doing he stated \"I'm not good, I'm not good at communication so if you have to interrogate me then go ahead\" this writer tried to provide clarification that this writer was just checking up, to which the pt presented to be dismissive but stated he would cont with engaging in conversation. This writer inquired if pt had slept well last night, to which he irritably stated \"I had dreams that I could not understand,  I was seeing things\" pt did not expand any further. This writer inquired if the pt needed any help with anything, to which he stated \"I feel like I am supposed to but I don't know how, I'm delusional, my mind keeps playing on my thoughts, I just can't figure it out but I have to.\" This writer tried to inquire further and asked probing questions and the pt looked away stated \"I don't know, I just don't know\" and kept shaking his head. Pt cont to meet criteria for inpt stay for further stabilization through meds management.     LOS:  10 Expected LOS: TDO expires 6/5/20204     Insurance info/prescription coverage:  [facility]   Date of last family contact:  ms luis fernando/aunt , reached out and provided an update on the recent clinical presentation.  Family requesting physician contact today:  No  Discharge plan:  to stabilize pt   Guns in the home:  No   Outpatient 
Behavioral Health Treatment Team Note     Patient goal(s) for today: \"stay focused\"  Treatment team focus/goals:  Medication management, group therapy, discharge planning     Progress note:  Pt was seen in his room as he was sitting on the side of his bed looking at the floor.  Pt presented to be alert, oriented, calm, guarded, open to this writer's approach, with a flat affect. Pt denied current si/hi/ah/vh. Pt was not observed mumbling to self and was able to provide eye contact and appropriately engage with this writer. When this writer asked pt how his weekend was, pt replied \"I made it through, I feel stable right now, my mood is stable, I want seclusion and want to be left by myself, I like being alone\" pt calmly requested not to engage in further conversation. Pt cont to meet criteria for inpt stay for further stabilization through meds management.      LOS:  14 Expected LOS: TDO expires 6/5/20204     Insurance info/prescription coverage:  [facility]   Date of last family contact:  ms gould/aunt , reached out and provided an update on the clinical presentation for the day, she stated that her  spoke to pt over the weekend and he presented to be delusional, still not believing that he is not part of the family, that the grandmother is not his family and he is not reality based and needs a longer inpt stay and requested call from attending psyc provider at some point tomorrow.   Family requesting physician contact today: yes  Discharge plan:  to stabilize pt   Guns in the home:  No   Outpatient provider(s):  ACT with [facility]    Collateral: this writer facilitated an intake meeting with ney vang and his intern with [facility] so that he can be enrolled in act services after discharge.     Participating treatment team members: Art Rainey, * (assigned SW), Liana Dwyer, MS  
Behavioral Health Treatment Team Note     Patient goal(s) for today: pt did not voice any  Treatment team focus/goals: meds management, dc planning, group therapy    Progress note:  Pt was seen in his room as he was walking around with the blinds closed. Pt presented as alert, somewhat anxious and in emotional distress, with a flat affect and actively responding to internal stimuli and preoccupied with his own thoughts, whispering to himself while trying to engage with this writer, presence of thought blocking noticed with difficulty trying to engage in conversation and slow to respond to questioning, tangential with loose associations. Pt denied current si/hi, pt did not ans if he was experiencing any vh but did report ah. Pt stated \"I am not so good, I am terrible right now, you know what the truth is, in my mind me trying to decide not to decide I am in so much turmoil right now.\" Pt provided with some encouragement and presented to be not receptive and walked away from this writer. Pt cont to meet criteria for inpt stay for further stabilization through meds management.      LOS:  3  Expected LOS: Expires: 6/5/20204     Insurance info/prescription coverage:   [facility]   Date of last family contact:  ms luis fernando/aunt  on 5/22  Family requesting physician contact today:  No  Discharge plan:   to stabilize pt   Guns in the home:  No   Outpatient provider(s):  will be established prior to dc     Participating treatment team members: Art Rainey, * (assigned SW), Liana Dwyer, MS  
DAY SHIFT    Pt up ad  olivia on unit. Pt presents with anxious mood and is isolative to room. Pt takes medication without difficulty.Pt presents less paranoid but is still guarded and withdrawn. Pt pleasant and cooperative. Pt denies depression and anxiety. Pt denies SI/HI. Pt denies AVH. Pt has poor eye contact. Pt received long acting injection today and tolerated well. Close observations continued to ensure pt safety.  
DAY SHIFT    Pt up ad olivia on unit. Pt is calm and cooperative. Pt is thought blocking and preoccupied with inner thoughts.Pt reports racing thoughts and hearing voices. Pt is paranoid and delusional. Pt takes medication without difficulty. Pt reports some depression but states he \"can't talk about it\" and that he is \"sorry.' Pt comes out of room and walks down the hallway naked twice but is easily redirectable. Close observations continued to ensure pt safety.   
DAY SHIFT    Pt up ad olivia on unit. Pt remains delusional and paranoid. Pt thought blocking and preoccupied with inner thoughts. Pt is disorganized and tangential. Pt takes medication without difficulty and shows this writer that he swallowed the medication. Later in the shift pt gets paranoid and making statements about not mattering in the world and that he was \"ready for his punishment\", pt  thinks that nobody cares about him. Therapeutic communication is used to talk with pt and reassured pt he was safe and staff was here to help him. Pt denies SI/HI. Pt endorses AH but cannot specify due to disorganized thoughts. Close observations continued to ensure pt safety.   
DISCHARGE SUMMARY    NAME:Art Rainey  : 1985  MRN: [id number]    The patient Art Rainey exhibits the ability to control behavior in a less restrictive environment.  Patient's level of functioning is improving.  No assaultive/destructive behavior has been observed for the past 24 hours.  No suicidal/homicidal threat or behavior has been observed for the past 24 hours.  There is no evidence of serious medication side effects.  Patient has not been in physical or protective restraints for at least the past 24 hours.    If weapons involved, how are they secured? N/a    Is patient aware of and in agreement with discharge plan? yes    Arrangements for medication:  Prescriptions see chart    Copy of discharge instructions to provider?:  yes    Arrangements for transportation home:  cab    Keep all follow up appointments as scheduled, continue to take prescribed medications per physician instructions.  Mental health crisis number:  [phone]  Mental Health Emergency WARM LINE      [phone] ([phone]      M-F: 9am to 9pm      Sat & Sun: 5pm - 9pm  National suicide prevention lines:                             3-269-VAXLJZY (4-951-546-0047)       4-304-165-TALK ([phone])    Crisis Text Line:  Text HOME to 886365  
HEARING DISPOSITION    : Judge Gonzalez   : Mr. Melvin   Committed: 15 Days   Expires: 6/5/20204     The patient was encouraged to come to the hearing but did not attend.   
Jamil / with [facility] Hospital act team informed of pt's dc for today. Dc info sent to [facility].  
MED HEARING     : Judge Gonzalez   : Mr. Juancarlos VALERIO: Anti-Psychotic and Anti-Anxiety Medication   Expires: 6/5/2024    
NURSING DISCHARGE NOTE    Discharged to home. Affect restricted; however, brightened when informed he would be d/c. Written follow up info given/explained; verbalized he understood. Took all personal belongings, and valuables. Escorted down to his ride, by writer.    
Nurse Note:    Patient declined  PO scheduled and Prn medications    Patient observed in the dayroom briefly before returning to assigned room. Patient observed with flat affect; patient states, \"All of this is my fault and I will be cloned because I'm vulnerable and I'm being manipulated\". Patient had agreed to take PO medications and then changed his mind and states, \"You are AI and I don't even know if I'm real\". Patient refused HS medications and Prn Trazodone and Klonopin and is currently resting quietly at this time. Patient is responding to internal stimuli and is preoccupied with delusions and is paranoid/suspicious. Will continue to monitor for safety.  
Nurse Note:    Patient is isolative to self and observed sitting up in bed this evening. Observed with flat affect and thought blocking during the nursing assessment. No report of SI and HI; appears to be responding to internal stimuli and is preoccupied. Patient has flight of ideas and is unable to follow the conversation/interaction. Patient did not receive a snack and is observed resting quietly with eyes closed. Writer called patient multiple of times for medication; patient did not open his eyes or acknowledge this writer was in the room and appears to be sleeping. Will continue to monitor for safety.  
Patient may need forced med petition; patient states, \"I don't think I need to take medications\". Patient agrees to take them, but changes his mind and is not consistent. Patient is unable to think clearly, is preoccupied, and thought blocking. Patient resting quietly at this time. Will continue to monitor for safety.  
Patient noted in bed. Presents isolative, flat, sad, depressed, anxious, paranoid. Compliant with bedtime medications. Maintain safety precautions.   
Patient received Trazodone and Klonopin; observed resting and then sitting up. Patient states, I don't feel like myself and I can't make decisions right now\". This writer encouraged him to talk to staff; patient observed walking around the unit at this time and states, \"Why do I feel like I can't walk around\". Writer explained that he could walk around the unit. Will continue to monitor for safety.  
Patient remains on close observation after being admitted to the services of Dr. Rick MD with Schizoaffective Disorder.  Patient was medically cleared in the [facility].  Patient is a TDO admission.  He was unable to complete the nursing assessment.  Patient was given fluids and snack by the RN.  Patient will be given the patient rights and responsibilities, psychiatric rights forms, handbook, security code and privacy practices.    Patient is a 39-year-old, -American male.  He presents as alert, thought-blocking, difficulty arranging his thoughts, poor eye contact, guarded, paranoid.  He admitted to having auditory hallucinations.  Delusional.  Patient was redirectable and cooperative.  Patient had not been medication compliant prior to hospitalization. He had not been sleeping or eating well.    Patient was given PRN medication due to his restlessness, pacing, bizarre behavior, agitation.    Negative UDS and ETOH.  
Patient remains on close observation.  Patient has been alert, oriented to self, place, cooperative, confused, anxious, tangential, compliant with instructions.  Staff entered patient's room at 1935 to have the therapeutic interview.   Patient was standing in the room beside the first bed completely naked.  He was instructed to put on clothes so he and staff could converse.  At 1945, patient is still standing in the same spot and completely naked.   He then put on clothes and sat on the bed. He was very tangential and anxious when speaking. He stated that \"I mess up everything\" but could not list anything specific.  He stated, \"I try to fix things but then I break them.\"  He said, \"My body is not mine.\"  He said he was delusional and that his \"thinking is off.\"  He said he takes on burdens that are not his.  He said, \"A curse was not on me.\"  He said \"I can't help my thinking.\"  He said, \"I do abnormal stuff and that is not normal for me.\"  Dr. Rick MD was called and an order for Clonazepam PRN was obtained.  By 20:00, patient was naked again.  He has been instructed three times by the RN and at least once by the MHT to not come out of his room naked.    2130--Patient was found standing in his shower without clothes on.  He was not showering or drying off.   The light was off.  He was touching his private area.  He agreed to take  PRN Trazodone and Clonazepam after initially refusing them at 2042.    22:30--Patient  laying in bed quietly.  
Patient remains on close observation.  Patient has been alert, oriented, cooperative and pleasant.  Patient has been laying in bed all shift, napping.  He said he was \"good\" but drowsy.  He denied SI or HI.  He denied hallucinations.  He denied any problems with his mood.  Continue to assess.  Medication compliant.  
Pt awake at onset of shift, pacing around his room and occasionally in hallway. Affect is blunted, mood is anxious. Denies SI/HI at this time. Significant thought blocking noted, pt presents as paranoid and guarded. Distracted, requiring increase redirection to complete tasks. Speech is soft and quiet. Pt hesitant and refused to take scheduled medications this morning, despite multiple attempts by writer and other staff. Ruminating, repeating \"I'm a screw up.\" \"How do I know if I am real.\" Pt also told writer he thought he was the devil and walked back into his room. Ate approximately 50% breakfast.     0855- With significant prompting and time, pt took all scheduled am medications and Klonopin 1 mg PO for anxiety.   
Pt has declined to complete OQ.  
Pt noted in bed resting with the covers over his head, easily awaken  upon entrance of the room, pt is pleasant, cooperative, compliant with medications, presents with a flat affect, denies any SI/HI.AH/VH, poor eye contact, denies any depression or anxiety, remain on close observation for safety.         
Pt noted in bed with covers over head during intervals, pt refused to take covers off of his head to take his medicine, just mumbling words under the cover , pt was informed that if he does not take his meds po that he will get an injection, pt turned over in bed with cover still over his head, I will offer the po medication again in a few minutes. Pt remain on close observations for safety.  
Pt noted resting in bed during intervals,pt was up at beginning of shift setting in day room watching, presented with a flat affect, denies any SI/HHI,AH/VH,compliant with medication, denies any depression or anxiety, pain or discomfort, pt remain on close observation for safety.  
Pt noted resting in bed with eyes closed during intervals , no distress noted, pt was up to Nurses Station requesting his night meds before time, presented with a flat affect , low pitch voice , denies everything , compliant with medications , remain on close observation for safety.  
Pt noted resting in bed with eyes closed during intervals,easily awaken for medication,compliant with medication,denies everything,flat affect, pt continues to rest weth eyes closed, and remains on close observation for safety.  
Pt noted resting in bed with head covered during intervals, pt was awaken for medication, pt is compliant with medication, denies everything, flat affect,poor eye contact, remains on close observations for safety.  
Pt noted up on unit during intervals, setting in dayroom watching TV, and going to get snacks, presents with a flat affect, denies everything,  compliant with medications,remain on close observation for safety.  
Pt refused PO Geodon despite multiple prompts and explanation of TEODORO. Pt continued to refuse stating \" I am not taking no more medications, I came here to get away.\"  Then in a robotic voice, told writer \"you cannot make me take anything.\" Writer and  presented pt with Haldol 5 mg IM injection, pt stated \"give me the pill then, its one or the other, I choose the pill.\" Pt given Geodon 40 mg PO, and explained that next time he refuses he will receive the IM injection per his TEODORO. Pt was rude and dismissive towards writer.  
Pt refused PO scheduled Geodon 40 mg and Depakote 500 mg after multiple attempts/prompts. Received Haldol 5 mg IM in left deltoid per TEODORO. Cooperative with administration. Continues to voice concerns that he cannot discern what is real and not real. Stated he believes \"they\" are harvesting his organs/body parts to make other people because he is not real. Also stated he is here as part of a \"big cycle\" that he has no say in what happens to him. Writer attempted reality orientation with pt, however not receptive, covered head with blanket and ignored writer.   
Pt rested quietly in bed throughout the evening; pt easily aroused for po Depakote; pt stated that he already took his medication; pt informed that this is his evening dose; pt accepted the medication; minimal interactions with staff; pt has a flat / dull affect; no c/o pain or discomfort; pt resting quietly in bed with the covers pulled over his head; no distress noted or voiced; pt continues to rest quietly in bed throughout the night. Close observation maintained for safety.     
Pt resting quietly in bed, throughout the evening, covered up with the blanket over his head. When the writer approached the pt with his medication, the pt ignored the writer. The writer continued to speak to the pt about his medication and the importance of taking it. The pt stated that he did not want anything that makes him sleepy. The writer informed the pt that he does not have to take the Trazodone, but if he wishes to do so later, he may. The pt continued to ignore the writer. He stated to the BHT that he thinks we are AI. The pt sat up in bed and leaned, purposely, over to the side of the bed, as though he was going to fall. The writer and the BHT was able to keep the pt from falling off of the bed. He purposely did it again and stated, \" It makes the staff run over.\" The pt is delusional - paranoid / persecutory delusion, preoccupied with thoughts, and experiencing thought blocking. He purposely will avoid interaction. He has poor insight and judgment. Pt is unmotivated. He, however, ambulated around the unit, stopped by the nurses station, stared inside the nurses station, then knocked on the door. The pt stated that he does not have an armband. The writer provided the pt with an armband. He asked to see it, looked at it, then stated, that is not his. The pt handed the armband to the writer, walked away, and returned to his room. He is resting quietly in bed with his eyes closed.     The pt refused the evening dose of Valproic acid solution. Dr. Macias aware. Per Dr. Macias, since the pt has taken the Geodon as ordered, we do not have to worry about the TEODORO at this time.     The pt did not want anything to eat or drink. His evening food tray remains untouched. He has a blunted / expressionless affect and poor eye contact. No c/o pain or discomfort. Pt remains on close observation for safety.   
Pt up ad olivia on unit mostly isolative to room. Pt is delusional, paranoid, guarded. Pt states \"I'll do whatever to cooperate. I can't make my own decisions.\" Pt affect is flat. Pt reports depression and anxiety but was unable to give a numerical rating. Pt denied SI/HI/AVH but mumbles to self. Pt denies pain or any physical complaints. Q 15 min checks continued to ensure pt safety on unit.   
Pt up ad olivia on unit pleasant and cooperative with staff. Minimal interaction witnessed between pt and peers but pt did sit in dayroom for meals. Pt denies depression, anxiety, SI/HI/AVH. Pt states he has the urge to isolate, and states \"I hate feeling like I'm forcing myself to be social.\" Writer continued to encourage pt to remain in the milieu. Pt denies any pain or physical complaints. Q 15 min checks continued to ensure pt safety on unit.   
Pt up ad olivia on unit, initially encouraged by this writer to be present in the milieu. Pt complied and sat in the dayroom for breakfast. Pt consumed approx 75% of meal and then returned to room. Pt affect is flat but brightens some on interaction. Pt denied depression, anxiety, SI/HI/AVH. Pt states he feels \"better\" and asked appropriate questions regarding medications. Pt told this writer \"I used to come to the hospital just to get a disability check, and then I was put on antipsychotics, and once I stopped taking them, I actually started hearing and seeing things.\" Pt told this writer he was at another facility previously where he received a long acting injection and got a \"lisp and had a lot of saliva\" and that was the reason he stopped taking his medication. Writer educated pt on side effects of antipsychotics and encouraged pt to speak to the Dr if he ever has those symptoms. Pt denies those symptoms currently and states he likes the current medications he is on. Pt states he tends to isolate when he feels depressed, and states his goal is to be more social today. Pt denied any pain or physical complaints. Pt attended AM group therapy and is currently sitting in the dayroom watching TV interacting appropriately with peers. Pt denies any pain or physical complaints. Q 15 min checks continued to ensure pt safety on unit.   
Pt up ad olivia, sitting in the dayroom at the beginning of the shift. When writer approached pt, pt became suspicious and paranoid, stating \"I don't know how all these people know my name.\"  Writer approached pt with scheduled medication while pt was in pt's room, and pt required multiple prompts to take medication. Pt saw that this writer had gloves on during medication administration and pt stated \"I know I am infected.\" Pt accepted both scheduled geodon and depakene. When writer asked pt how he was feeling, pt responded, \"I don't feel anything. I'm retarded.\" Pt denied depression, anxiety SI/HI/AVH but appeared to be responding to internal stimuli as evidence by pt mumbling and whispering to self. Pt is delusional and came out of his room due to another peer screaming and pt stated \"I need the medication. They don't.\" Pt denies any pain or physical complaints. Q 15 min checks continued to ensure pt safety on unit.   
Pt. Is up at intervals,affect is flat,appetite good, appearance in hospital  gown,has been seen in hallway more frequently. No interaction with peers,  less paranoid,pt,had old menu and is asking why it has Thursday dated 5/29/24.explained to pt.it was a typo and did he want me to discard the old menu.pt.refused,no other concerns voiced ,remains on close observation.  
Pt.is up and visible on unit at intervals, affect is brighter,appetite good,appearance is appropriate,came to nurses station to accept his medications,also observed in dayroom for  small amounts of time with a peer. Denies hallucinations,pleasant upon approach, no aggression or agitated behavior at present.no concerns voiced, remains on close observation.  
Pt.is up and visible on unit,affect is flat,appetite good, appearance is fairly neat, denies feeling suicidal,denies hallucinations, a little less isolative today ,some interaction with peers, a little more calmer,no concerns voiced.remains on close observation,  
The pt attended the evening group session and was out of his room for snacks. No distress noted or voiced. Pt accepted the Valproic Acid solution without difficulty. No prn medication requested or needed. Pt keeps the covers pulled over his head most of the time. He has minimal interaction with staff. Pt mumbled something softly prior to taking his Valproic Acid solution. When asked to repeat what he said, the pt pulled the blanket over his head. Per BHT, he verbalized to her what he wanted. No distress noted or voiced. Respirations are quiet and unlabored. Close observation maintained for safety. He has been resting quietly in bed throughout the night.   
The pt continues to isolate to the room. He lays in bed with the covers pulled over his head. The pt refused to have his vitals taken earlier this evening. He refused his scheduled dose of Valproic acid solution - medication wasted. The pt has an expressionless affect. He mumbles softly and is difficult to understand. He appears paranoid. The pt did not want anything to drink or eat. He has been resting quietly in bed without any distress. No c/o pain or discomfort. The writer is unable to interact with the pt due to the pt avoiding the writer. The pt avoids interacting with anyone.    The pt continues to rest quietly in bed with the blanket pulled over his head. Respirations are quiet and unlabored. Close observation maintained for safety.     Pt rested quietly in bed throughout the night.   
The pt has been resting quietly in bed throughout the evening. When addressed, the pt ignores the staff x 3. The pt did not get out of bed for snacks or anything to drink. Respirations are quiet and unlabored. Pt appeared to be sleeping throughout the evening and did not receive Depakote liquid. No prn medication given. Pt rested quietly in bed throughout the night with the covers pulled over his head and a small part of his face showing. No distress noted or voiced. Respirations remains quiet and unlabored. Close observation maintained for safety.   
This writer spoke to grandmother and informed her of pt's dc for today. Grandmother aware that the pt is being dc home and she did not voice any further concerns.  
KERRIE), Liana Dwyer, MS  
environment: pt lives with grandmother    GUARDIAN/POA: No    Guardian Name: n/a    Guardian Contact: n/a    Health issues: pt denied    Trauma history: pt denied    Legal issues: pt denied    History of  service: pt denied    Financial status: disability    Mandaen/cultural factors: pt did not voice any    Education/work history: 11th grade/unemployed    Have you been licensed as a health care professional (current or ): pt declined    Describe coping skills:gabi Dwyer  2024    
DEAN  Take 1 tablet by mouth nightly as needed for Sleep            STOP taking these medications      valproic acid 250 MG capsule  Commonly known as: DEPAKENE               Where to Get Your Medications        These medications were sent to [facility]/pharmacy #[id number] - [place], [place] - [address] - P [phone] - F [phone]  [address] [facility] [address]      Phone: [phone]   hydrOXYzine HCl 50 MG tablet  traZODone 50 MG tablet  valproic acid 250 MG/5ML Soln oral solution  ziprasidone 20 MG capsule  ziprasidone 60 MG capsule           To obtain results of studies pending at discharge, please contact     Follow-up Information       Follow up With Specialties Details Why Contact [facility] Health Support Services  Go to Same Day Access  Intake Hours  Monday-Friday: 8 a.m.-4:30 p.m.  Assessment Hours  Monday: 8 a.m.-2 p.m.  Tuesday: 8 a.m.-4 p.m.  Wednesday: 8 a.m.-4 p.m.  Thursday: 8 a.m.-4 p.m.  Friday: 8 a.m.-2 p.m.  Requested Personal Information  You may be asked to provide some or all of the following as part of the Same Day Access program:    Photo ID  Social security number  Insurance card  Applicable co-payment  Proof of all forms of household income  Legal guardian and custody documentation (if applicable)  List of all medications, dosages and prescribing doctor  Name of physical health conditions  Name, address and phone number for an emergency   Name, address  and phone number for primary care physician (PCP) Address: [facility] [place] [address] [phone].            Advanced Directive:   Does the patient have an appointed surrogate decision maker? Not applicable  Does the patient have a Medical Advance Directive? Not applicable  Does the patient have a Psychiatric Advance Directive? Not applicable  If the patient does not have a surrogate or Medical Advance Directive AND Psychiatric Advance Directive, the patient

## 2024-06-07 NOTE — GROUP NOTE
Group Therapy Note    Date: 6/7/2024    Group Start Time: 1120  Group End Time: 1200  Group Topic: Education Group - Inpatient    SSR 2  NON ACUTE    Karen Lewis        Group Therapy Note    Facilitated discussion on stress exploration focused on being able to identify daily hassles, major life changes and life circumstances that contribute to stress and identify daily uplifts, healthy coping strategies and protective factors that counteract stress       Attendees: 4/9      Notes:  Encouraged but did not attend    Discipline Responsible: Recreational Therapist      Signature:  AIDA WrightS
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
## 2024-09-05 ENCOUNTER — OFFICE VISIT (OUTPATIENT)
Facility: CLINIC | Age: 39
End: 2024-09-05

## 2024-09-05 VITALS
RESPIRATION RATE: 16 BRPM | SYSTOLIC BLOOD PRESSURE: 136 MMHG | BODY MASS INDEX: 29.29 KG/M2 | DIASTOLIC BLOOD PRESSURE: 88 MMHG | WEIGHT: 221 LBS | HEIGHT: 73 IN | HEART RATE: 106 BPM | OXYGEN SATURATION: 100 % | TEMPERATURE: 97.9 F

## 2024-09-05 DIAGNOSIS — F25.0 SCHIZOAFFECTIVE DISORDER, BIPOLAR TYPE (HCC): ICD-10-CM

## 2024-09-05 DIAGNOSIS — Z11.59 NEED FOR HEPATITIS C SCREENING TEST: ICD-10-CM

## 2024-09-05 DIAGNOSIS — Z23 NEED FOR TDAP VACCINATION: ICD-10-CM

## 2024-09-05 DIAGNOSIS — Z76.89 ENCOUNTER TO ESTABLISH CARE: Primary | ICD-10-CM

## 2024-09-05 DIAGNOSIS — Z11.4 SCREENING FOR HIV WITHOUT PRESENCE OF RISK FACTORS: ICD-10-CM

## 2024-09-05 DIAGNOSIS — Z13.6 ENCOUNTER FOR LIPID SCREENING FOR CARDIOVASCULAR DISEASE: ICD-10-CM

## 2024-09-05 DIAGNOSIS — Z13.220 ENCOUNTER FOR LIPID SCREENING FOR CARDIOVASCULAR DISEASE: ICD-10-CM

## 2024-09-05 DIAGNOSIS — R73.03 PREDIABETES: ICD-10-CM

## 2024-09-05 RX ORDER — ARIPIPRAZOLE 300 MG
300 KIT INTRAMUSCULAR
COMMUNITY
Start: 2024-08-29

## 2024-09-05 SDOH — ECONOMIC STABILITY: FOOD INSECURITY: WITHIN THE PAST 12 MONTHS, THE FOOD YOU BOUGHT JUST DIDN'T LAST AND YOU DIDN'T HAVE MONEY TO GET MORE.: NEVER TRUE

## 2024-09-05 SDOH — ECONOMIC STABILITY: FOOD INSECURITY: WITHIN THE PAST 12 MONTHS, YOU WORRIED THAT YOUR FOOD WOULD RUN OUT BEFORE YOU GOT MONEY TO BUY MORE.: NEVER TRUE

## 2024-09-05 SDOH — ECONOMIC STABILITY: INCOME INSECURITY: HOW HARD IS IT FOR YOU TO PAY FOR THE VERY BASICS LIKE FOOD, HOUSING, MEDICAL CARE, AND HEATING?: NOT HARD AT ALL

## 2024-09-05 ASSESSMENT — PATIENT HEALTH QUESTIONNAIRE - PHQ9
SUM OF ALL RESPONSES TO PHQ9 QUESTIONS 1 & 2: 0
SUM OF ALL RESPONSES TO PHQ QUESTIONS 1-9: 0
1. LITTLE INTEREST OR PLEASURE IN DOING THINGS: NOT AT ALL
SUM OF ALL RESPONSES TO PHQ QUESTIONS 1-9: 0
2. FEELING DOWN, DEPRESSED OR HOPELESS: NOT AT ALL
SUM OF ALL RESPONSES TO PHQ QUESTIONS 1-9: 0
SUM OF ALL RESPONSES TO PHQ QUESTIONS 1-9: 0

## 2024-09-05 ASSESSMENT — ENCOUNTER SYMPTOMS
ABDOMINAL DISTENTION: 0
COUGH: 0
DIARRHEA: 0
PHOTOPHOBIA: 0
SHORTNESS OF BREATH: 0
VOMITING: 0
ABDOMINAL PAIN: 0
CHEST TIGHTNESS: 0
WHEEZING: 0
NAUSEA: 0

## 2024-09-05 NOTE — PROGRESS NOTES
\"Have you been to the ER, urgent care clinic since your last visit?  Hospitalized since your last visit?\"    NO    “Have you seen or consulted any other health care providers outside of [facility] since your last visit?”    NO      Chief Complaint   Patient presents with    New Patient     /88 (Site: Left Upper Arm, Position: Sitting, Cuff Size: Medium Adult)   Pulse (!) 106   Temp 97.9 °F (36.6 °C) (Temporal)   Resp 16   Ht 1.854 m (6' 1\")   Wt 100.2 kg (221 lb)   SpO2 100%   BMI 29.16 kg/m²         Click Here for Release of Records Request  
trazodone as needed  He lives with his grandmother. He has been helping her out around her home because she recently had multiple heart surgeries.  He says that he has tried to be more helpful to her however they often clashes because he feels they spend a lot of time together, at times too much.  In which case he is able to go to [facility].  He is overall feeling well today.  He does not have any major complaints.    He does not endorse any pain today.  He says that he has previously been told that he has prediabetes.  Last A1c unknown. He would like to update his Tdap today.    Health Maintenance  Health Maintenance Due   Topic Date Due    Pneumococcal 0-64 years Vaccine (1 of 2 - PCV) Never done    Varicella vaccine (1 of 2 - 13+ 2-dose series) Never done    HIV screen  Never done    Hepatitis C screen  Never done    Hepatitis B vaccine (1 of 3 - 19+ 3-dose series) Never done    A1C test (Diabetic or Prediabetic)  10/31/2021    Annual Wellness Visit (Medicare)  Never done    Flu vaccine (1) Never done    COVID-19 Vaccine (1 - 2023-24 season) Never done       Vitals:     Vitals:    09/05/24 0910   BP: 136/88   Site: Left Upper Arm   Position: Sitting   Cuff Size: Medium Adult   Pulse: (!) 106   Resp: 16   Temp: 97.9 °F (36.6 °C)   TempSrc: Temporal   SpO2: 100%   Weight: 100.2 kg (221 lb)   Height: 1.854 m (6' 1\")     Body mass index is 29.16 kg/m².    Wt Readings from Last 3 Encounters:   09/05/24 100.2 kg (221 lb)   05/20/24 88.9 kg (196 lb)   03/21/24 94.8 kg (209 lb)       Medications:     Current Outpatient Medications   Medication Sig Dispense Refill    ABILIFY MAINTENA 300 MG PRSY prefilled syringe Inject 1 Syringe into the muscle every 28 days      valproic acid (DEPAKENE) 250 MG/5ML SOLN oral solution Take 10 mLs by mouth 2 times daily 600 mL 1    traZODone (DESYREL) 50 MG tablet Take 1 tablet by mouth nightly as needed for Sleep 30 tablet 1     No current facility-administered medications for this visit.

## 2024-10-03 LAB
ALBUMIN SERPL-MCNC: 4.6 G/DL (ref 4.1–5.1)
ALP SERPL-CCNC: 74 IU/L (ref 44–121)
ALT SERPL-CCNC: 41 IU/L (ref 0–44)
AST SERPL-CCNC: 28 IU/L (ref 0–40)
BILIRUB SERPL-MCNC: 0.4 MG/DL (ref 0–1.2)
BUN SERPL-MCNC: 11 MG/DL (ref 6–24)
BUN/CREAT SERPL: 11 (ref 9–20)
CALCIUM SERPL-MCNC: 9.5 MG/DL (ref 8.7–10.2)
CHLORIDE SERPL-SCNC: 102 MMOL/L (ref 96–106)
CHOLEST SERPL-MCNC: 191 MG/DL (ref 100–199)
CO2 SERPL-SCNC: 26 MMOL/L (ref 20–29)
CREAT SERPL-MCNC: 1.01 MG/DL (ref 0.76–1.27)
EGFRCR SERPLBLD CKD-EPI 2021: 96 ML/MIN/1.73
ERYTHROCYTE [DISTWIDTH] IN BLOOD BY AUTOMATED COUNT: 12.6 % (ref 11.6–15.4)
GLOBULIN SER CALC-MCNC: 2.5 G/DL (ref 1.5–4.5)
GLUCOSE SERPL-MCNC: 94 MG/DL (ref 70–99)
HBA1C MFR BLD: 6.1 % (ref 4.8–5.6)
HCT VFR BLD AUTO: 46.1 % (ref 37.5–51)
HCV IGG SERPL QL IA: NON REACTIVE
HDLC SERPL-MCNC: 45 MG/DL
HGB BLD-MCNC: 15.3 G/DL (ref 13–17.7)
HIV 1+2 AB+HIV1 P24 AG SERPL QL IA: NON REACTIVE
LDLC SERPL CALC-MCNC: 130 MG/DL (ref 0–99)
MCH RBC QN AUTO: 31.4 PG (ref 26.6–33)
MCHC RBC AUTO-ENTMCNC: 33.2 G/DL (ref 31.5–35.7)
MCV RBC AUTO: 95 FL (ref 79–97)
PLATELET # BLD AUTO: 308 X10E3/UL (ref 150–450)
POTASSIUM SERPL-SCNC: 4.4 MMOL/L (ref 3.5–5.2)
PROT SERPL-MCNC: 7.1 G/DL (ref 6–8.5)
RBC # BLD AUTO: 4.87 X10E6/UL (ref 4.14–5.8)
SODIUM SERPL-SCNC: 142 MMOL/L (ref 134–144)
TRIGL SERPL-MCNC: 86 MG/DL (ref 0–149)
VLDLC SERPL CALC-MCNC: 16 MG/DL (ref 5–40)
WBC # BLD AUTO: 8.4 X10E3/UL (ref 3.4–10.8)

## 2024-12-04 ENCOUNTER — OFFICE VISIT (OUTPATIENT)
Facility: CLINIC | Age: 39
End: 2024-12-04

## 2024-12-04 VITALS
BODY MASS INDEX: 30.03 KG/M2 | WEIGHT: 234 LBS | TEMPERATURE: 98.7 F | HEIGHT: 74 IN | DIASTOLIC BLOOD PRESSURE: 86 MMHG | SYSTOLIC BLOOD PRESSURE: 128 MMHG | OXYGEN SATURATION: 96 % | RESPIRATION RATE: 16 BRPM | HEART RATE: 96 BPM

## 2024-12-04 DIAGNOSIS — Z00.00 INITIAL MEDICARE ANNUAL WELLNESS VISIT: Primary | ICD-10-CM

## 2024-12-04 DIAGNOSIS — F17.210 TOBACCO DEPENDENCE DUE TO CIGARETTES: ICD-10-CM

## 2024-12-04 DIAGNOSIS — F25.0 SCHIZOAFFECTIVE DISORDER, BIPOLAR TYPE (HCC): ICD-10-CM

## 2024-12-04 DIAGNOSIS — R73.03 PREDIABETES: ICD-10-CM

## 2024-12-04 DIAGNOSIS — E78.00 ELEVATED LDL CHOLESTEROL LEVEL: ICD-10-CM

## 2024-12-04 RX ORDER — NICOTINE 21 MG/24HR
1 PATCH, TRANSDERMAL 24 HOURS TRANSDERMAL DAILY
Qty: 42 PATCH | Refills: 3 | Status: SHIPPED | OUTPATIENT
Start: 2024-12-04

## 2024-12-04 ASSESSMENT — PATIENT HEALTH QUESTIONNAIRE - PHQ9
SUM OF ALL RESPONSES TO PHQ QUESTIONS 1-9: 0
SUM OF ALL RESPONSES TO PHQ QUESTIONS 1-9: 0
9. THOUGHTS THAT YOU WOULD BE BETTER OFF DEAD, OR OF HURTING YOURSELF: NOT AT ALL
SUM OF ALL RESPONSES TO PHQ QUESTIONS 1-9: 0
SUM OF ALL RESPONSES TO PHQ QUESTIONS 1-9: 0
8. MOVING OR SPEAKING SO SLOWLY THAT OTHER PEOPLE COULD HAVE NOTICED. OR THE OPPOSITE, BEING SO FIGETY OR RESTLESS THAT YOU HAVE BEEN MOVING AROUND A LOT MORE THAN USUAL: NOT AT ALL
SUM OF ALL RESPONSES TO PHQ9 QUESTIONS 1 & 2: 0
5. POOR APPETITE OR OVEREATING: NOT AT ALL
10. IF YOU CHECKED OFF ANY PROBLEMS, HOW DIFFICULT HAVE THESE PROBLEMS MADE IT FOR YOU TO DO YOUR WORK, TAKE CARE OF THINGS AT HOME, OR GET ALONG WITH OTHER PEOPLE: NOT DIFFICULT AT ALL
7. TROUBLE CONCENTRATING ON THINGS, SUCH AS READING THE NEWSPAPER OR WATCHING TELEVISION: NOT AT ALL
1. LITTLE INTEREST OR PLEASURE IN DOING THINGS: NOT AT ALL
3. TROUBLE FALLING OR STAYING ASLEEP: NOT AT ALL
6. FEELING BAD ABOUT YOURSELF - OR THAT YOU ARE A FAILURE OR HAVE LET YOURSELF OR YOUR FAMILY DOWN: NOT AT ALL
2. FEELING DOWN, DEPRESSED OR HOPELESS: NOT AT ALL
4. FEELING TIRED OR HAVING LITTLE ENERGY: NOT AT ALL

## 2024-12-04 NOTE — PROGRESS NOTES
\"Have you been to the ER, urgent care clinic since your last visit?  Hospitalized since your last visit?\"    NO    “Have you seen or consulted any other health care providers outside our system since your last visit?”    NO      Chief Complaint   Patient presents with    Medicare AWV     /86 (Site: Left Upper Arm, Position: Sitting, Cuff Size: Large Adult)   Pulse 96   Temp 98.7 °F (37.1 °C) (Temporal)   Resp 16   Ht 1.88 m (6' 2\")   Wt 106.1 kg (234 lb)   SpO2 96%   BMI 30.04 kg/m²        
min  Interventions:  Not right now due to holidays, plans to be more active after december     Abnormal BMI (obese):  Body mass index is 30.04 kg/m². (!) Abnormal  Interventions:  See AVS for additional education material      Dentist Screen:  Have you seen the dentist within the past year?: (!) No    Intervention:  Advised to schedule with their dentist     Vision Screen:  Do you have difficulty driving, watching TV, or doing any of your daily activities because of your eyesight?: No  Have you had an eye exam within the past year?: (!) No  Interventions:    Says that he does his eye exam through [facility]     ADL's:   Patient reports needing help with:  Select all that apply: (!) Transportation  Interventions:  Says that he has a team that helps with transportation    Advanced Directives:  Do you have a Living Will?: (!) No    Intervention:  has NO advanced directive - not interested in additional information      Tobacco Use:    Tobacco Use      Smoking status: Every Day        Packs/day: 0.50        Years: 0.5 packs/day for 6.9 years (3.5 ttl pk-yrs)        Types: Cigarettes        Start date: 1/1/2018      Smokeless tobacco: Never     Interventions:  Trying to cut down, plans to stop as New Year resolution, would like to start patch          Objective   Vitals:    12/04/24 0935   BP: 128/86   Site: Left Upper Arm   Position: Sitting   Cuff Size: Large Adult   Pulse: 96   Resp: 16   Temp: 98.7 °F (37.1 °C)   TempSrc: Temporal   SpO2: 96%   Weight: 106.1 kg (234 lb)   Height: 1.88 m (6' 2\")      Body mass index is 30.04 kg/m².        General Appearance: alert and oriented to person, place and time, well-developed and well-nourished, in no acute distress  Skin: warm and dry, no rash or erythema  Head: normocephalic and atraumatic  Pulmonary/Chest: clear to auscultation bilaterally- no wheezes, rales or rhonchi, normal air movement, no respiratory distress  Cardiovascular: normal rate, regular rhythm, and normal S1

## 2024-12-04 NOTE — PATIENT INSTRUCTIONS
having a problem from this test. If dilating drops are used for a vision test, they may make the eyes sting and cause a medicine taste in the mouth.  Follow-up care is a key part of your treatment and safety. Be sure to make and go to all appointments, and call your doctor if you are having problems. It's also a good idea to know your test results and keep a list of the medicines you take.  Where can you learn more?  Go to https://[email or web address].net/patientEd and enter G551 to learn more about \"Learning About Vision Tests.\"  Current as of: June 5, 2023  Content Version: 14.2  © 2024 [employer].   Care instructions adapted under license by [employer]. If you have questions about a medical condition or this instruction, always ask your healthcare professional. [employer] disclaims any warranty or liability for your use of this information.           Starting a Weight Loss Plan: Care Instructions  Overview     It can be a challenge to lose weight. But your doctor can help you make a weight-loss plan that meets your needs.  You don't have to make a lot of big changes at once. A better idea might be to focus on small changes and stick with them. When those changes become habit, you can add a few more changes.  Some people find it helpful to take an exercise or nutrition class. If you have questions, ask your doctor about seeing a registered dietitian or an exercise specialist. You might also think about joining a weight-loss support group.  If you're not ready to make changes right now, try to pick a date in the future. Then make an appointment with your doctor to talk about when and how you'll get started with a plan.  Follow-up care is a key part of your treatment and safety. Be sure to make and go to all appointments, and call your doctor if you are having problems. It's also a good idea to know your test results and keep a list of the medicines you take.  How can you care for yourself at

## 2025-06-01 SDOH — ECONOMIC STABILITY: TRANSPORTATION INSECURITY
IN THE PAST 12 MONTHS, HAS THE LACK OF TRANSPORTATION KEPT YOU FROM MEDICAL APPOINTMENTS OR FROM GETTING MEDICATIONS?: NO

## 2025-06-01 SDOH — ECONOMIC STABILITY: TRANSPORTATION INSECURITY
IN THE PAST 12 MONTHS, HAS LACK OF TRANSPORTATION KEPT YOU FROM MEETINGS, WORK, OR FROM GETTING THINGS NEEDED FOR DAILY LIVING?: NO

## 2025-06-01 SDOH — ECONOMIC STABILITY: INCOME INSECURITY: IN THE LAST 12 MONTHS, WAS THERE A TIME WHEN YOU WERE NOT ABLE TO PAY THE MORTGAGE OR RENT ON TIME?: NO

## 2025-06-01 SDOH — ECONOMIC STABILITY: FOOD INSECURITY: WITHIN THE PAST 12 MONTHS, YOU WORRIED THAT YOUR FOOD WOULD RUN OUT BEFORE YOU GOT MONEY TO BUY MORE.: PATIENT DECLINED

## 2025-06-01 SDOH — ECONOMIC STABILITY: FOOD INSECURITY: WITHIN THE PAST 12 MONTHS, THE FOOD YOU BOUGHT JUST DIDN'T LAST AND YOU DIDN'T HAVE MONEY TO GET MORE.: PATIENT DECLINED

## 2025-06-04 ENCOUNTER — OFFICE VISIT (OUTPATIENT)
Facility: CLINIC | Age: 40
End: 2025-06-04
Payer: MEDICARE

## 2025-06-04 VITALS
BODY MASS INDEX: 28.88 KG/M2 | RESPIRATION RATE: 16 BRPM | OXYGEN SATURATION: 97 % | HEART RATE: 96 BPM | HEIGHT: 74 IN | TEMPERATURE: 98.1 F | WEIGHT: 225 LBS | SYSTOLIC BLOOD PRESSURE: 110 MMHG | DIASTOLIC BLOOD PRESSURE: 76 MMHG

## 2025-06-04 DIAGNOSIS — F25.0 SCHIZOAFFECTIVE DISORDER, BIPOLAR TYPE (HCC): ICD-10-CM

## 2025-06-04 DIAGNOSIS — E78.00 ELEVATED LDL CHOLESTEROL LEVEL: ICD-10-CM

## 2025-06-04 DIAGNOSIS — R09.81 NASAL CONGESTION: ICD-10-CM

## 2025-06-04 DIAGNOSIS — R73.03 PREDIABETES: Primary | ICD-10-CM

## 2025-06-04 DIAGNOSIS — F17.210 TOBACCO DEPENDENCE DUE TO CIGARETTES: ICD-10-CM

## 2025-06-04 DIAGNOSIS — R73.03 PREDIABETES: ICD-10-CM

## 2025-06-04 PROCEDURE — G8427 DOCREV CUR MEDS BY ELIG CLIN: HCPCS

## 2025-06-04 PROCEDURE — G8417 CALC BMI ABV UP PARAM F/U: HCPCS

## 2025-06-04 PROCEDURE — 99214 OFFICE O/P EST MOD 30 MIN: CPT

## 2025-06-04 PROCEDURE — 4004F PT TOBACCO SCREEN RCVD TLK: CPT

## 2025-06-04 RX ORDER — FLUTICASONE PROPIONATE 50 MCG
2 SPRAY, SUSPENSION (ML) NASAL DAILY
Qty: 16 G | Refills: 0 | Status: SHIPPED | OUTPATIENT
Start: 2025-06-04

## 2025-06-04 RX ORDER — CETIRIZINE HYDROCHLORIDE 10 MG/1
10 TABLET ORAL DAILY
Qty: 30 TABLET | Refills: 0 | Status: SHIPPED | OUTPATIENT
Start: 2025-06-04

## 2025-06-04 ASSESSMENT — ENCOUNTER SYMPTOMS
ABDOMINAL DISTENTION: 0
CHEST TIGHTNESS: 0
SHORTNESS OF BREATH: 0
ABDOMINAL PAIN: 0
DIARRHEA: 0
COUGH: 1
NAUSEA: 0
WHEEZING: 0
VOMITING: 0
PHOTOPHOBIA: 0

## 2025-06-04 ASSESSMENT — PATIENT HEALTH QUESTIONNAIRE - PHQ9
SUM OF ALL RESPONSES TO PHQ QUESTIONS 1-9: 0
1. LITTLE INTEREST OR PLEASURE IN DOING THINGS: NOT AT ALL
5. POOR APPETITE OR OVEREATING: NOT AT ALL
7. TROUBLE CONCENTRATING ON THINGS, SUCH AS READING THE NEWSPAPER OR WATCHING TELEVISION: NOT AT ALL
9. THOUGHTS THAT YOU WOULD BE BETTER OFF DEAD, OR OF HURTING YOURSELF: NOT AT ALL
3. TROUBLE FALLING OR STAYING ASLEEP: NOT AT ALL
6. FEELING BAD ABOUT YOURSELF - OR THAT YOU ARE A FAILURE OR HAVE LET YOURSELF OR YOUR FAMILY DOWN: NOT AT ALL
10. IF YOU CHECKED OFF ANY PROBLEMS, HOW DIFFICULT HAVE THESE PROBLEMS MADE IT FOR YOU TO DO YOUR WORK, TAKE CARE OF THINGS AT HOME, OR GET ALONG WITH OTHER PEOPLE: NOT DIFFICULT AT ALL
SUM OF ALL RESPONSES TO PHQ QUESTIONS 1-9: 0
SUM OF ALL RESPONSES TO PHQ QUESTIONS 1-9: 0
8. MOVING OR SPEAKING SO SLOWLY THAT OTHER PEOPLE COULD HAVE NOTICED. OR THE OPPOSITE, BEING SO FIGETY OR RESTLESS THAT YOU HAVE BEEN MOVING AROUND A LOT MORE THAN USUAL: NOT AT ALL
2. FEELING DOWN, DEPRESSED OR HOPELESS: NOT AT ALL
SUM OF ALL RESPONSES TO PHQ QUESTIONS 1-9: 0
4. FEELING TIRED OR HAVING LITTLE ENERGY: NOT AT ALL

## 2025-06-04 NOTE — PROGRESS NOTES
Have you been to the ER, urgent care clinic since your last visit?  Hospitalized since your last visit?   NO    Have you seen or consulted any other health care providers outside our system since your last visit?   NO      Chief Complaint   Patient presents with    Follow-up     /76 (BP Site: Right Upper Arm, Patient Position: Sitting, BP Cuff Size: Large Adult)   Pulse 96   Temp 98.1 °F (36.7 °C) (Skin)   Resp 16   Ht 1.88 m (6' 2\")   Wt 102.1 kg (225 lb)   SpO2 97%   BMI 28.89 kg/m²

## 2025-06-04 NOTE — PROGRESS NOTES
Art Rainey  40 y.o. male  1985  [address]  [place] [address]  [id number]     [facility]: Progress Note       Encounter Date: 6/4/2025    Patient presents with the following chief complaint(s)    Chief Complaint   Patient presents with    Follow-up        History provided by patient    Assessment and Plan:   1. Prediabetes  Comments:  Labs ordered.  Discussed importance of well-balanced diet and increase physical activity  Orders:  -     Hemoglobin A1C; Future  2. Schizoaffective disorder, bipolar type (HCC)  Comments:  Chronic.  Getting current medication therapy.  Will screen for HLD.  Orders:  -     Lipid Panel; Future  3. Adult BMI 29.0-29.9 kg/sq m  4. Elevated LDL cholesterol level  Comments:  Labs ordered.  Orders:  -     Lipid Panel; Future  5. Tobacco dependence due to cigarettes  Comments:  Discussed importance of continued cessation.  6. Nasal congestion  -     fluticasone (FLONASE) 50 MCG/ACT nasal spray; 2 sprays by Each Nostril route daily, Disp-16 g, R-0Normal  -     cetirizine (ZYRTEC) 10 MG tablet; Take 1 tablet by mouth daily, Disp-30 tablet, R-0Normal         Return in about 6 months (around 12/8/2025).  History of Present Illness   Art Rainey is a 40 y.o. male with past medical history listed, who presents to clinic today for a follow up visit on chronic conditions.     Has some cold symptoms- congestion, cough productive x 5 days. Says that his niece was sick recently.  No fever, chills, N/V, sneezing, no sinus pressure, headaches    Tobacco use- was given rx for patches. Says that he did not get patches. He stopped cold turkeyt x 3 months, then started back but not smoking as much- about 2 cigarettes per day.    Prediabetes & elevated LDL cholesterol-last A1c 6.0%- says he is eating healthier and less often. Some days he is active.      Mood-working with [facility] mental health team with [facility]. Seeing

## 2025-06-19 ENCOUNTER — RESULTS FOLLOW-UP (OUTPATIENT)
Facility: CLINIC | Age: 40
End: 2025-06-19

## 2025-06-19 LAB
CHOLEST SERPL-MCNC: 194 MG/DL (ref 100–199)
HBA1C MFR BLD: 6.4 % (ref 4.8–5.6)
HDLC SERPL-MCNC: 37 MG/DL
LDLC SERPL CALC-MCNC: 131 MG/DL (ref 0–99)
TRIGL SERPL-MCNC: 144 MG/DL (ref 0–149)
VLDLC SERPL CALC-MCNC: 26 MG/DL (ref 5–40)